# Patient Record
Sex: MALE | Race: WHITE | NOT HISPANIC OR LATINO | Employment: OTHER | URBAN - METROPOLITAN AREA
[De-identification: names, ages, dates, MRNs, and addresses within clinical notes are randomized per-mention and may not be internally consistent; named-entity substitution may affect disease eponyms.]

---

## 2017-02-20 ENCOUNTER — OFFICE VISIT (OUTPATIENT)
Dept: OPTOMETRY | Facility: CLINIC | Age: 72
End: 2017-02-20
Payer: MEDICARE

## 2017-02-20 DIAGNOSIS — H25.13 NUCLEAR SCLEROSIS, BILATERAL: Primary | ICD-10-CM

## 2017-02-20 DIAGNOSIS — H52.4 PRESBYOPIA: ICD-10-CM

## 2017-02-20 DIAGNOSIS — H52.223 REGULAR ASTIGMATISM OF BOTH EYES: ICD-10-CM

## 2017-02-20 DIAGNOSIS — H26.9 CORTICAL CATARACT: ICD-10-CM

## 2017-02-20 DIAGNOSIS — H43.393 VITREOUS FLOATERS, BILATERAL: ICD-10-CM

## 2017-02-20 DIAGNOSIS — H43.811 VITREOUS DETACHMENT OF RIGHT EYE: ICD-10-CM

## 2017-02-20 PROCEDURE — 99999 PR PBB SHADOW E&M-EST. PATIENT-LVL II: CPT | Mod: PBBFAC,,, | Performed by: OPTOMETRIST

## 2017-02-20 PROCEDURE — 92015 DETERMINE REFRACTIVE STATE: CPT | Mod: S$GLB,,, | Performed by: OPTOMETRIST

## 2017-02-20 PROCEDURE — 92014 COMPRE OPH EXAM EST PT 1/>: CPT | Mod: S$GLB,,, | Performed by: OPTOMETRIST

## 2017-02-20 NOTE — MR AVS SNAPSHOT
[facility]  [address]  [facility] [address]  Phone: [phone]  Fax: [phone]                  Peng Sullivan   2017 8:00 AM   Office Visit    Description:  Male : 1945   Provider:  Farhad Burton OD   Department:  [facility]           Reason for Visit     Concerns About Ocular Health                To Do List           Goals (5 Years of Data)     None      [facility]     [facility] -  Assistance  Registered nurses in the [facility] provide clinical advisement, health education, appointment booking, and other advisory services.  Call for this free service at [phone].             Medications           Message regarding Medications     Verify the changes and/or additions to your medication regime listed below are the same as discussed with your clinician today.  If any of these changes or additions are incorrect, please notify your healthcare provider.             Verify that the below list of medications is an accurate representation of the medications you are currently taking.  If none reported, the list may be blank. If incorrect, please contact your healthcare provider. Carry this list with you in case of emergency.           Current Medications     atorvastatin (LIPITOR) 20 MG tablet Take by mouth. 1 Tablet Oral Every day    lisinopril-hydrochlorothiazide (PRINZIDE,ZESTORETIC) 10-12.5 mg per tablet            Clinical Reference Information           Allergies as of 2017     No Known Allergies      Immunizations Administered on Date of Encounter - 2017     None      [facility] Sign-Up     Activating your [facility] account is as easy as 1-2-3!     1) Visit [email or web address], select Sign Up Now, enter this activation code and your date of birth, then select Next.  [email or web address]  Expires: 2017  8:56 AM      2) Create a username and password to use when you visit [facility] in the future and  select a security question in case you lose your password and select Next.    3) Enter your e-mail address and click Sign Up!    Additional Information  If you have questions, please e-mail [email or web address] or call [phone] to talk to our [facility] staff. Remember, [facility] is NOT to be used for urgent needs. For medical emergencies, dial 911.         Instructions    Early nuclear sclerotic/peripheral cortical cataract in both eyes, consistent with age, as noted previously.   S/p vitreous detachment in the right eye.   Bilateral vitreous floaters, more apparent in the right eye.  Otherwise, good ocular health in both eyes. No hypertensive retinal changes in either eye.   Astigmatic refractive error in each eye, with good correctable VA in each eye.  Presbyopia.  New spectacle lens Rx issued for full-time wear.   Recheck in 12 - 18 months - or prior if any problems in the interim.              Language Assistance Services     ATTENTION: Language assistance services are available, free of charge. Please call [phone].      ATENCIÓN: Si shane wu, tiene a del rio disposición servicios gratuitos de asistencia lingüística. Llame al [phone].     NILES Ý: N?u b?n nói Ti?ng Vi?t, có các d?ch v? h? tr? ngôn ng? mi?n phí dành cho b?n. G?i s? [phone].         [facility] complies with applicable Federal civil rights laws and does not discriminate on the basis of race, color, national origin, age, disability, or sex.

## 2017-02-20 NOTE — PATIENT INSTRUCTIONS
Early nuclear sclerotic/peripheral cortical cataract in both eyes, consistent with age, as noted previously.   S/p vitreous detachment in the right eye.   Bilateral vitreous floaters, more apparent in the right eye.  Otherwise, good ocular health in both eyes. No hypertensive retinal changes in either eye.   Astigmatic refractive error in each eye, with good correctable VA in each eye.  Presbyopia.  New spectacle lens Rx issued for full-time wear.   Recheck in 12 - 18 months - or prior if any problems in the interim.
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
## 2018-08-24 ENCOUNTER — OFFICE VISIT (OUTPATIENT)
Dept: OPTOMETRY | Facility: CLINIC | Age: 73
End: 2018-08-24
Payer: COMMERCIAL

## 2018-08-24 DIAGNOSIS — H43.811 VITREOUS DETACHMENT OF RIGHT EYE: ICD-10-CM

## 2018-08-24 DIAGNOSIS — H25.13 NUCLEAR SCLEROSIS, BILATERAL: Primary | ICD-10-CM

## 2018-08-24 DIAGNOSIS — H52.223 REGULAR ASTIGMATISM OF BOTH EYES: ICD-10-CM

## 2018-08-24 DIAGNOSIS — H52.4 PRESBYOPIA: ICD-10-CM

## 2018-08-24 DIAGNOSIS — H26.9 CORTICAL CATARACT: ICD-10-CM

## 2018-08-24 DIAGNOSIS — H43.393 VITREOUS FLOATERS, BILATERAL: ICD-10-CM

## 2018-08-24 PROCEDURE — 92015 DETERMINE REFRACTIVE STATE: CPT | Mod: S$GLB,,, | Performed by: OPTOMETRIST

## 2018-08-24 PROCEDURE — 92014 COMPRE OPH EXAM EST PT 1/>: CPT | Mod: S$GLB,,, | Performed by: OPTOMETRIST

## 2018-08-24 PROCEDURE — 99999 PR PBB SHADOW E&M-EST. PATIENT-LVL III: CPT | Mod: PBBFAC,,, | Performed by: OPTOMETRIST

## 2018-08-24 NOTE — PATIENT INSTRUCTIONS
Early nuclear sclerotic/peripheral cortical cataract in both eyes, consistent with age, as noted previously.   Prior diagnosis of vitreous detachment in the right eye.   Bilateral vitreous floaters, more apparent in the right eye.  Otherwise, good ocular health in both eyes. No hypertensive retinal changes in either eye.   Astigmatic refractive error in each eye, with good correctable VA in each eye.  Presbyopia.  New spectacle lens Rx issued for full-time wear.   Recheck in 12 - 18 months - or prior if any problems in the interim.
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
## 2019-02-19 LAB
CHOL/HDLC RATIO: 3
CHOLESTEROL, TOTAL: 157
HDLC SERPL-MCNC: 59 MG/DL
LDLC SERPL CALC-MCNC: 87 MG/DL
TRIGL SERPL-MCNC: 68 MG/DL (ref 40–160)

## 2019-06-18 ENCOUNTER — TELEPHONE (OUTPATIENT)
Dept: ENDOSCOPY | Facility: HOSPITAL | Age: 74
End: 2019-06-18

## 2019-06-18 DIAGNOSIS — K63.5 POLYP OF COLON, UNSPECIFIED PART OF COLON, UNSPECIFIED TYPE: Primary | ICD-10-CM

## 2019-06-18 NOTE — TELEPHONE ENCOUNTER
----- Message from Howard Golden sent at 6/18/2019  2:45 PM CDT -----  Contact: pt: [phone]  Needs Advice    Reason for call: pt states a referral along with his records was sent over by Dr. Keyes, states Dr. Keyes would like for Dr. Lee to take a look at the records from the pt colonoscopy where a polyp was found and would possible like for Dr. Lee to remove it         Communication Preference: pt: [phone]

## 2019-06-20 NOTE — TELEPHONE ENCOUNTER
----- Message from Howard Golden sent at 6/18/2019  2:45 PM CDT -----  Contact: pt: [phone]  Needs Advice    Reason for call: pt states a referral along with his records was sent over by Dr. Keyes, states Dr. Keyes would like for Dr. Lee to take a look at the records from the pt colonoscopy where a polyp was found and would possible like for Dr. Lee to remove it         Communication Preference: pt: [phone]

## 2019-06-24 ENCOUNTER — TELEPHONE (OUTPATIENT)
Dept: ENDOSCOPY | Facility: HOSPITAL | Age: 74
End: 2019-06-24

## 2019-06-24 NOTE — TELEPHONE ENCOUNTER
----- Message from Ely Mccarthy sent at 6/24/2019  3:33 PM CDT -----  Contact: pt#[phone]  Needs Advice    Reason for call:Pt is callin for status on upcoming procedure       Communication Preference:call    Additional Information:

## 2019-06-24 NOTE — TELEPHONE ENCOUNTER
Spoke with patient. Colonoscopy scheduled 7/19 at 9:30a. Endo Scheduling Nurse will contact patient with prep.

## 2019-07-01 ENCOUNTER — TELEPHONE (OUTPATIENT)
Dept: ENDOSCOPY | Facility: HOSPITAL | Age: 74
End: 2019-07-01

## 2019-07-01 DIAGNOSIS — Z12.11 SPECIAL SCREENING FOR MALIGNANT NEOPLASMS, COLON: Primary | ICD-10-CM

## 2019-07-01 RX ORDER — LOSARTAN POTASSIUM 50 MG/1
50 TABLET ORAL DAILY
COMMUNITY
End: 2019-10-10 | Stop reason: SDUPTHER

## 2019-07-01 RX ORDER — HYDROCHLOROTHIAZIDE 12.5 MG/1
12.5 TABLET ORAL DAILY
COMMUNITY
End: 2019-08-22

## 2019-07-01 RX ORDER — ASPIRIN 81 MG/1
81 TABLET ORAL DAILY
COMMUNITY
End: 2019-07-01

## 2019-07-01 RX ORDER — SODIUM, POTASSIUM,MAG SULFATES 17.5-3.13G
1 SOLUTION, RECONSTITUTED, ORAL ORAL DAILY
Qty: 1 KIT | Refills: 0 | Status: SHIPPED | OUTPATIENT
Start: 2019-07-01 | End: 2019-07-03

## 2019-07-01 NOTE — TELEPHONE ENCOUNTER
Contacted patient to confirm appointment for a Colonoscopy on 7/19/2019 at 9:30 am. Reviewed medical history, medications and instructed on procedure and prep. He voiced an understanding. Sent a copy of instructions to address listed in chart.

## 2019-07-19 ENCOUNTER — ANESTHESIA (OUTPATIENT)
Dept: ENDOSCOPY | Facility: HOSPITAL | Age: 74
End: 2019-07-19
Payer: MEDICARE

## 2019-07-19 ENCOUNTER — HOSPITAL ENCOUNTER (OUTPATIENT)
Facility: HOSPITAL | Age: 74
Discharge: HOME | End: 2019-07-19
Attending: INTERNAL MEDICINE | Admitting: INTERNAL MEDICINE
Payer: MEDICARE

## 2019-07-19 ENCOUNTER — ANESTHESIA EVENT (OUTPATIENT)
Dept: ENDOSCOPY | Facility: HOSPITAL | Age: 74
End: 2019-07-19
Payer: MEDICARE

## 2019-07-19 VITALS
DIASTOLIC BLOOD PRESSURE: 68 MMHG | BODY MASS INDEX: 35.28 KG/M2 | HEIGHT: 71 IN | WEIGHT: 252 LBS | OXYGEN SATURATION: 100 % | HEART RATE: 64 BPM | SYSTOLIC BLOOD PRESSURE: 117 MMHG | RESPIRATION RATE: 19 BRPM | TEMPERATURE: 98 F

## 2019-07-19 DIAGNOSIS — D12.6 COLON ADENOMA: Primary | ICD-10-CM

## 2019-07-19 PROCEDURE — D9220A PRA ANESTHESIA: Mod: ANES,,, | Performed by: ANESTHESIOLOGY

## 2019-07-19 PROCEDURE — 63600175 PHARM REV CODE 636 W HCPCS: Performed by: NURSE ANESTHETIST, CERTIFIED REGISTERED

## 2019-07-19 PROCEDURE — 45390: ICD-10-PCS | Mod: ,,, | Performed by: INTERNAL MEDICINE

## 2019-07-19 PROCEDURE — 88305 TISSUE EXAM BY PATHOLOGIST: CPT | Performed by: PATHOLOGY

## 2019-07-19 PROCEDURE — 88305 TISSUE EXAM BY PATHOLOGIST: CPT | Mod: 26,,, | Performed by: PATHOLOGY

## 2019-07-19 PROCEDURE — D9220A PRA ANESTHESIA: ICD-10-PCS | Mod: CRNA,,, | Performed by: NURSE ANESTHETIST, CERTIFIED REGISTERED

## 2019-07-19 PROCEDURE — 27200997: Performed by: INTERNAL MEDICINE

## 2019-07-19 PROCEDURE — 27202363 HC INJECTION AGENT, SUBMUCOSAL, ANY: Performed by: INTERNAL MEDICINE

## 2019-07-19 PROCEDURE — 37000008 HC ANESTHESIA 1ST 15 MINUTES: Performed by: INTERNAL MEDICINE

## 2019-07-19 PROCEDURE — D9220A PRA ANESTHESIA: ICD-10-PCS | Mod: ANES,,, | Performed by: ANESTHESIOLOGY

## 2019-07-19 PROCEDURE — 45390 COLONOSCOPY W/RESECTION: CPT | Mod: ,,, | Performed by: INTERNAL MEDICINE

## 2019-07-19 PROCEDURE — 45390 COLONOSCOPY W/RESECTION: CPT | Performed by: INTERNAL MEDICINE

## 2019-07-19 PROCEDURE — 88305 TISSUE SPECIMEN TO PATHOLOGY - SURGERY: ICD-10-PCS | Mod: 26,,, | Performed by: PATHOLOGY

## 2019-07-19 PROCEDURE — D9220A PRA ANESTHESIA: Mod: CRNA,,, | Performed by: NURSE ANESTHETIST, CERTIFIED REGISTERED

## 2019-07-19 PROCEDURE — 37000009 HC ANESTHESIA EA ADD 15 MINS: Performed by: INTERNAL MEDICINE

## 2019-07-19 PROCEDURE — 25000003 PHARM REV CODE 250: Performed by: INTERNAL MEDICINE

## 2019-07-19 PROCEDURE — 27201089 HC SNARE, DISP (ANY): Performed by: INTERNAL MEDICINE

## 2019-07-19 RX ORDER — SODIUM CHLORIDE 0.9 % (FLUSH) 0.9 %
10 SYRINGE (ML) INJECTION
Status: DISCONTINUED | OUTPATIENT
Start: 2019-07-19 | End: 2019-07-19 | Stop reason: HOSPADM

## 2019-07-19 RX ORDER — ATORVASTATIN CALCIUM 10 MG/1
10 TABLET, FILM COATED ORAL DAILY
COMMUNITY
End: 2019-08-22

## 2019-07-19 RX ORDER — PROPOFOL 10 MG/ML
VIAL (ML) INTRAVENOUS CONTINUOUS PRN
Status: DISCONTINUED | OUTPATIENT
Start: 2019-07-19 | End: 2019-07-19

## 2019-07-19 RX ORDER — SODIUM CHLORIDE 9 MG/ML
INJECTION, SOLUTION INTRAVENOUS CONTINUOUS
Status: DISCONTINUED | OUTPATIENT
Start: 2019-07-19 | End: 2019-07-19 | Stop reason: HOSPADM

## 2019-07-19 RX ORDER — PROPOFOL 10 MG/ML
VIAL (ML) INTRAVENOUS
Status: DISCONTINUED | OUTPATIENT
Start: 2019-07-19 | End: 2019-07-19

## 2019-07-19 RX ORDER — LIDOCAINE HCL/PF 100 MG/5ML
SYRINGE (ML) INTRAVENOUS
Status: DISCONTINUED | OUTPATIENT
Start: 2019-07-19 | End: 2019-07-19

## 2019-07-19 RX ADMIN — PROPOFOL 80 MG: 10 INJECTION, EMULSION INTRAVENOUS at 10:07

## 2019-07-19 RX ADMIN — PROPOFOL 175 MCG/KG/MIN: 10 INJECTION, EMULSION INTRAVENOUS at 10:07

## 2019-07-19 RX ADMIN — LIDOCAINE HYDROCHLORIDE 50 MG: 20 INJECTION, SOLUTION INTRAVENOUS at 10:07

## 2019-07-19 RX ADMIN — SODIUM CHLORIDE: 0.9 INJECTION, SOLUTION INTRAVENOUS at 10:07

## 2019-07-19 NOTE — [employer] PATIENT INSTRUCTIONS
Discharge Summary/Instructions after an Endoscopic Procedure  Patient Name: Peng Sullivan  Patient MRN: [id number]  Patient YOB: 1945  Friday, July 19, 2019  Shantanu Lee MD  RESTRICTIONS:  During your procedure today, you received medications for sedation.  These   medications may affect your judgment, balance and coordination.  Therefore,   for 24 hours, you have the following restrictions:   - DO NOT drive a car, operate machinery, make legal/financial decisions,   sign important papers or drink alcohol.    ACTIVITY:  Today: no heavy lifting, straining or running due to procedural   sedation/anesthesia.  The following day: return to full activity including work.  DIET:  Eat and drink normally unless instructed otherwise.     TREATMENT FOR COMMON SIDE EFFECTS:  - Mild abdominal pain, nausea, belching, bloating or excessive gas:  rest,   eat lightly and use a heating pad.  - Sore Throat: treat with throat lozenges and/or gargle with warm salt   water.  - Because air was used during the procedure, expelling large amounts of air   from your rectum or belching is normal.  - If a bowel prep was taken, you may not have a bowel movement for 1-3 days.    This is normal.  SYMPTOMS TO WATCH FOR AND REPORT TO YOUR PHYSICIAN:  1. Abdominal pain or bloating, other than gas cramps.  2. Chest pain.  3. Back pain.  4. Signs of infection such as: chills or fever occurring within 24 hours   after the procedure.  5. Rectal bleeding, which would show as bright red, maroon, or black stools.   (A tablespoon of blood from the rectum is not serious, especially if   hemorrhoids are present.)  6. Vomiting.  7. Weakness or dizziness.  GO DIRECTLY TO THE NEAREST EMERGENCY ROOM IF YOU HAVE ANY OF THE FOLLOWING:      Difficulty breathing              Chills and/or fever over 101 F   Persistent vomiting and/or vomiting blood   Severe abdominal pain   Severe chest pain   Black, tarry stools   Bleeding- more than one  tablespoon   Any other symptom or condition that you feel may need urgent attention  Your doctor recommends these additional instructions:  If any biopsies were taken, your doctors clinic will contact you in 1 to 2   weeks with any results.  - Discharge patient to home (ambulatory).   - Resume previous diet; Discharge to home (ambulatory); Resume outpatient   medications  - Await pathology results.   - Repeat colonoscopy in 3 years for surveillance based on pathology results.   Polyp was removed in 1 piece (not piecemeal), so repeat colonoscopy can   likely be in 3 years with Dr. Keyes, depending on final pathology.  - Return to primary care physician as previously scheduled.   - Resume previous diet.  For questions, problems or results please call your physician - Shantanu Lee MD at Work:  [phone].  [facility], EMERGENCY ROOM PHONE NUMBER: [phone]  IF A COMPLICATION OR EMERGENCY SITUATION ARISES AND YOU ARE UNABLE TO REACH   YOUR PHYSICIAN - GO DIRECTLY TO THE EMERGENCY ROOM.  Shantanu Lee MD  7/19/2019 10:50:59 AM  This report has been verified and signed electronically.  [employer]

## 2019-07-19 NOTE — TRANSFER OF CARE
"Anesthesia Transfer of Care Note    Patient: Peng Sullivan    Procedure(s) Performed: Procedure(s) (LRB):  COLONOSCOPY/pos emr (N/A)    Patient location: PACU    Anesthesia Type: general    Transport from OR: Transported from OR on 100% O2 by closed face mask with adequate spontaneous ventilation    Post pain: adequate analgesia    Post assessment: no apparent anesthetic complications    Post vital signs: stable    Level of consciousness: awake    Nausea/Vomiting: no nausea/vomiting    Complications: none    Transfer of care protocol was followed      Last vitals:   Visit Vitals  /73 (BP Location: Left arm, Patient Position: Lying)   Pulse 72   Temp 36.8 °C (98.2 °F) (Temporal)   Resp 17   Ht 5' 11" (1.803 m)   Wt 114.3 kg (252 lb)   SpO2 (!) 93%   BMI 35.15 kg/m²     "

## 2019-07-19 NOTE — ANESTHESIA POSTPROCEDURE EVALUATION
Anesthesia Post Evaluation    Patient: Peng Sullivan    Procedure(s) Performed: Procedure(s) (LRB):  COLONOSCOPY/pos emr (N/A)    Final Anesthesia Type: general  Patient location during evaluation: PACU  Patient participation: Yes- Able to Participate  Level of consciousness: awake and alert  Post-procedure vital signs: reviewed and stable  Pain management: adequate  Airway patency: patent  PONV status at discharge: No PONV  Anesthetic complications: no      Cardiovascular status: blood pressure returned to baseline  Respiratory status: unassisted  Hydration status: euvolemic  Follow-up not needed.          Vitals Value Taken Time   /68 7/19/2019 11:16 AM   Temp 36.9 °C (98.4 °F) 7/19/2019 11:15 AM   Pulse 65 7/19/2019 11:19 AM   Resp 18 7/19/2019 11:19 AM   SpO2 98 % 7/19/2019 11:17 AM   Vitals shown include unvalidated device data.      No case tracking events are documented in the log.      Pain/Frank Score: Frank Score: 10 (7/19/2019 11:15 AM)

## 2019-07-19 NOTE — ANESTHESIA PREPROCEDURE EVALUATION
07/19/2019  Peng Sullivan is a 74 y.o., male for colonoscopy and polyp removal.    Anesthesia Evaluation    I have reviewed the Patient Summary Reports.    I have reviewed the Nursing Notes.   I have reviewed the Medications.     Review of Systems  Anesthesia Hx:  No problems with previous Anesthesia  Denies Family Hx of Anesthesia complications.   Denies Personal Hx of Anesthesia complications.   Cardiovascular:   Exercise tolerance: good Hypertension    Pulmonary:  Pulmonary Normal    Hepatic/GI:   Colon Polyp   Neurological:  Neurology Normal        Physical Exam  General:  Well nourished    Airway/Jaw/Neck:  Airway Findings: Mouth Opening: Normal General Airway Assessment: Adult  Mallampati: II  TM Distance: Normal, at least 6 cm  Jaw/Neck Findings:  Neck ROM: Normal ROM  Neck Findings:     Eyes/Ears/Nose:  Eyes/Ears/Nose Findings:    Dental:  Dental Findings: In tact    Chest/Lungs:  Chest/Lungs Findings: Normal Respiratory Rate, Clear to auscultation     Heart/Vascular:  Heart Findings: Rate: Normal  Rhythm: Regular Rhythm        Mental Status:  Mental Status Findings:  Cooperative, Alert and Oriented       Patient Active Problem List   Diagnosis    Posterior vitreous detachment of both eyes    Nuclear sclerotic cataract of both eyes    Colon adenoma         Anesthesia Plan  Type of Anesthesia, risks & benefits discussed:  Anesthesia Type:  general  Patient's Preference:   Intra-op Monitoring Plan: standard ASA monitors  Intra-op Monitoring Plan Comments:   Post Op Pain Control Plan: per primary service following discharge from PACU  Post Op Pain Control Plan Comments:   Induction:   IV  Beta Blocker:         Informed Consent: Patient understands risks and agrees with Anesthesia plan.  Questions answered. Anesthesia consent signed with patient representative.  ASA Score: 2     Day of Surgery  Review of History & Physical:    H&P update referred to the surgeon.         Ready For Surgery From Anesthesia Perspective.     Past Surgical History:   Procedure Laterality Date    APPENDECTOMY  01/01/1954    COLONOSCOPY  06/03/2019    [facility]

## 2019-07-19 NOTE — DISCHARGE INSTRUCTIONS
Colonoscopy     A camera attached to a flexible tube with a viewing lens is used to take video pictures.     Colonoscopy is a test to view the inside of your lower digestive tract (colon and rectum). Sometimes it can show the last part of the small intestine (ileum). During the test, small pieces of tissue may be removed for testing. This is called a biopsy. Small growths, such as polyps, may also be removed.   Why is colonoscopy done?  The test is done to help look for colon cancer. And it can help find the source of abdominal pain, bleeding, and changes in bowel habits. It may be needed once a year, depending on factors such as your:  · Age  · Health history  · Family health history  · Symptoms  · Results from any prior colonoscopy  Risks and possible complications  These include:  · Bleeding               · A puncture or tear in the colon   · Risks of anesthesia  · A cancer lesion not being seen  Getting ready   To prepare for the test:  · Talk with your healthcare provider about the risks of the test (see below). Also ask your healthcare provider about alternatives to the test.  · Tell your healthcare provider about any medicines you take. Also tell him or her about any health conditions you may have.  · Make sure your rectum and colon are empty for the test. Follow the diet and bowel prep instructions exactly. If you dont, the test may need to be rescheduled.  · Plan for a friend or family member to drive you home after the test.     Colonoscopy provides an inside view of the entire colon.     You may discuss the results with your doctor right away or at a future visit.  During the test   The test is usually done in the hospital on an outpatient basis. This means you go home the same day. The procedure takes about 30 minutes. During that time:  · You are given relaxing (sedating) medicine through an IV line. You may be drowsy, or fully asleep.  · The healthcare provider will first give you a physical exam to  check for anal and rectal problems.  · Then the anus is lubricated and the scope inserted.  · If you are awake, you may have a feeling similar to needing to have a bowel movement. You may also feel pressure as air is pumped into the colon. Its OK to pass gas during the procedure.  · Biopsy, polyp removal, or other treatments may be done during the test.  After the test   You may have gas right after the test. It can help to try to pass it to help prevent later bloating. Your healthcare provider may discuss the results with you right away. Or you may need to schedule a follow-up visit to talk about the results. After the test, you can go back to your normal eating and other activities. You may be tired from the sedation and need to rest for a few hours.  Date Last Reviewed: 11/1/2016 © 2000-2017 [employer]. [address] [place] [address]. All rights reserved. This information is not intended as a substitute for professional medical care. Always follow your healthcare professional's instructions.      Anesthesia: General Anesthesia     You are watched continuously during your procedure by your anesthesia provider.     Youre due to have surgery. During surgery, youll be given medicine called anesthesia or anesthetic. This will keep you comfortable and pain-free. Your anesthesia provider will use general anesthesia.  What is general anesthesia?  General anesthesia puts you into a state like deep sleep. It goes into the bloodstream (IV anesthetics), into the lungs (gas anesthetics), or both. You feel nothing during the procedure. You will not remember it. During the procedure, the anesthesia provider monitors you continuously. He or she checks your heart rate and rhythm, blood pressure, breathing, and blood oxygen.  · IV anesthetics. IV anesthetics are given through an IV line in your arm. Theyre often given first. This is so you are asleep before a gas anesthetic is started. Some kinds of  IV anesthetics relieve pain. Others relax you. Your doctor will decide which kind is best in your case.  · Gas anesthetics. Gas anesthetics are breathed into the lungs. They are often used to keep you asleep. They can be given through a facemask or a tube placed in your larynx or trachea (breathing tube).  ¨ If you have a facemask, your anesthesia provider will most likely place it over your nose and mouth while youre still awake. Youll breathe oxygen through the mask as your IV anesthetic is started. Gas anesthetic may be added through the mask.  ¨ If you have a tube in the larynx or trachea, it will be inserted into your throat after youre asleep.  Anesthesia tools and medicines  You will likely have:  · IV anesthetics. These are put into an IV line into your bloodstream.  · Gas anesthetics. You breathe these anesthetics into your lungs, where they pass into your bloodstream.  · Pulse oximeter. This is a small clip that is attached to the end of your finger. This measures your blood oxygen level.  · Electrocardiography leads (electrodes). These are small sticky pads that are placed on your chest. They record your heart rate and rhythm.  · Blood pressure cuff. This reads your blood pressure.  Risks and possible complications  General anesthesia has some risks. These include:  · Breathing problems  · Nausea and vomiting  · Sore throat or hoarseness (usually temporary)  · Allergic reaction to the anesthetic  · Irregular heartbeat (rare)  · Cardiac arrest (rare)   Anesthesia safety  · Follow all instructions you are given for how long not to eat or drink before your procedure.  · Be sure your doctor knows what medicines and drugs you take. This includes over-the-counter medicines, herbs, supplements, alcohol or other drugs. You will be asked when those were last taken.  · Have an adult family member or friend drive you home after the procedure.  · For the first 24 hours after your surgery:  ¨ Do not drive or use  heavy equipment.  ¨ Do not make important decisions or sign legal documents. If important decisions or signing legal documents is necessary during the first 24 hours after surgery, have a trusted family member or spouse act on your behalf.  ¨ Avoid alcohol.  ¨ Have a responsible adult stay with you. He or she can watch for problems and help keep you safe.  Date Last Reviewed: 12/1/2016  © 4167-1700 [employer]. [address] [place] [address], [place] [address]. All rights reserved. This information is not intended as a substitute for professional medical care. Always follow your healthcare professional's instructions.

## 2019-07-19 NOTE — H&P
Short Stay Endoscopy History and Physical    PCP - Joe Torres MD  Referring Physician - Evans Keyes MD  [address] [place] [address]    Procedure - colonoscopy with EMR  ASA - per anesthesia  Mallampati - per anesthesia  History of Anesthesia problems - no  Family history Anesthesia problems -  no   Plan of anesthesia - General    HPI:  This is a 74 y.o. male here for evaluation of: large cecal polyp    Reflux - no  Dysphagia - no  Abdominal pain - no  Diarrhea - no    ROS:  Constitutional: No fevers, chills, No weight loss  CV: No chest pain  Pulm: No cough, No shortness of breath  GI: see HPI    Medical History:  has a past medical history of Cataract, Diverticulosis, Hemorrhoids, High cholesterol, History of colon polyps (6/2/99, 6/3/19), and Hypertension.    Surgical History:  has a past surgical history that includes Appendectomy (01/01/1954) and Colonoscopy (06/03/2019).    Family History: family history includes Cancer in his sister; Cataracts in his sister; Heart attack in his father; No Known Problems in his brother, maternal aunt, maternal grandfather, maternal grandmother, maternal uncle, mother, paternal aunt, paternal grandfather, paternal grandmother, paternal uncle, sister, sister, sister, and sister..    Social History:  reports that he has quit smoking. He does not have any smokeless tobacco history on file. He reports that he drinks alcohol. He reports that he does not use drugs.    Review of patient's allergies indicates:  No Known Allergies    Medications:   Medications Prior to Admission   Medication Sig Dispense Refill Last Dose    atorvastatin (LIPITOR) 10 MG tablet Take 10 mg by mouth once daily.   7/18/2019 at Unknown time    hydroCHLOROthiazide (HYDRODIURIL) 12.5 MG Tab Take 12.5 mg by mouth once daily.   7/19/2019 at Unknown time    losartan (COZAAR) 50 MG tablet Take 50 mg by mouth once daily.   7/19/2019 at Unknown time       Physical  Exam:    Vital Signs:   Vitals:    07/19/19 0922   BP: 120/73   Pulse: 72   Resp: 17   Temp: 98.2 °F (36.8 °C)       General Appearance: Well appearing in no acute distress  Eyes:    No scleral icterus  ENT: Neck supple  Lungs: CTA anteriorly  Heart:  Regular  Abdomen: Soft, non tender, non distended with normal bowel sounds.    Labs:  Lab Results   Component Value Date    CHOL 167 12/26/2007    TRIG 103 12/26/2007    HDL 56 12/26/2007    ALT 53 (H) 12/26/2007    AST 37 12/26/2007     12/26/2007    K 4.1 12/26/2007     12/26/2007    CREATININE 1.0 12/26/2007    BUN 22 12/26/2007    CO2 26 12/26/2007    PSA 1.0 12/26/2007       I have explained the risks and benefits of this endoscopic procedure to the patient including but not limited to bleeding, inflammation, infection, perforation, and death.      Shantanu Lee MD

## 2019-07-30 ENCOUNTER — TELEPHONE (OUTPATIENT)
Dept: ENDOSCOPY | Facility: HOSPITAL | Age: 74
End: 2019-07-30

## 2019-07-30 NOTE — TELEPHONE ENCOUNTER
----- Message from Shantanu Lee MD sent at 7/29/2019  5:03 PM CDT -----  Julianne- Please let pt know that his polyp pathology was benign. Given that we were able to remove the polyp in 1 piece and the benign pathology, his next colonoscopy should be in 3 years. He can have that done locally by Dr. Keyes.

## 2019-08-15 ENCOUNTER — TELEPHONE (OUTPATIENT)
Dept: ENDOSCOPY | Facility: HOSPITAL | Age: 74
End: 2019-08-15

## 2019-08-15 NOTE — TELEPHONE ENCOUNTER
----- Message from Radha Larkin sent at 8/15/2019  1:34 PM CDT -----  Contact: self [phone]  .Needs Advice    Reason for call:        Communication Preference:phone     Additional Information:  Pt states he's calling in regards to his colonoscopy results please call back to discuss

## 2019-08-15 NOTE — TELEPHONE ENCOUNTER
----- Message from Shantanu Lee MD sent at 7/29/2019  5:03 PM CDT -----  Julianne- Please let pt know that his polyp pathology was benign. Given that we were able to remove the polyp in 1 piece and the benign pathology, his next colonoscopy should be in 3 years. He can have that done locally by Dr. Keyes.    Patient informed.

## 2019-08-22 ENCOUNTER — OFFICE VISIT (OUTPATIENT)
Dept: FAMILY MEDICINE | Facility: CLINIC | Age: 74
End: 2019-08-22
Payer: MEDICARE

## 2019-08-22 ENCOUNTER — TELEPHONE (OUTPATIENT)
Dept: ADMINISTRATIVE | Facility: HOSPITAL | Age: 74
End: 2019-08-22

## 2019-08-22 VITALS
TEMPERATURE: 98 F | SYSTOLIC BLOOD PRESSURE: 122 MMHG | DIASTOLIC BLOOD PRESSURE: 74 MMHG | WEIGHT: 257.94 LBS | HEIGHT: 71 IN | HEART RATE: 70 BPM | BODY MASS INDEX: 36.11 KG/M2 | OXYGEN SATURATION: 94 %

## 2019-08-22 DIAGNOSIS — E78.49 OTHER HYPERLIPIDEMIA: Primary | ICD-10-CM

## 2019-08-22 DIAGNOSIS — I10 BENIGN ESSENTIAL HYPERTENSION: ICD-10-CM

## 2019-08-22 DIAGNOSIS — D12.6 COLON ADENOMA: ICD-10-CM

## 2019-08-22 PROCEDURE — 99499 UNLISTED E&M SERVICE: CPT | Mod: HCNC,S$GLB,, | Performed by: INTERNAL MEDICINE

## 2019-08-22 PROCEDURE — 99213 PR OFFICE/OUTPT VISIT, EST, LEVL III, 20-29 MIN: ICD-10-PCS | Mod: HCNC,S$GLB,, | Performed by: INTERNAL MEDICINE

## 2019-08-22 PROCEDURE — 1101F PR PT FALLS ASSESS DOC 0-1 FALLS W/OUT INJ PAST YR: ICD-10-PCS | Mod: HCNC,CPTII,S$GLB, | Performed by: INTERNAL MEDICINE

## 2019-08-22 PROCEDURE — 99499 RISK ADDL DX/OHS AUDIT: ICD-10-PCS | Mod: HCNC,S$GLB,, | Performed by: INTERNAL MEDICINE

## 2019-08-22 PROCEDURE — 1101F PT FALLS ASSESS-DOCD LE1/YR: CPT | Mod: HCNC,CPTII,S$GLB, | Performed by: INTERNAL MEDICINE

## 2019-08-22 PROCEDURE — 99213 OFFICE O/P EST LOW 20 MIN: CPT | Mod: HCNC,S$GLB,, | Performed by: INTERNAL MEDICINE

## 2019-08-22 PROCEDURE — 99999 PR PBB SHADOW E&M-EST. PATIENT-LVL III: ICD-10-PCS | Mod: PBBFAC,HCNC,, | Performed by: INTERNAL MEDICINE

## 2019-08-22 PROCEDURE — 99999 PR PBB SHADOW E&M-EST. PATIENT-LVL III: CPT | Mod: PBBFAC,HCNC,, | Performed by: INTERNAL MEDICINE

## 2019-08-22 RX ORDER — ATORVASTATIN CALCIUM 20 MG/1
0.5 TABLET, FILM COATED ORAL DAILY
COMMUNITY
Start: 2019-07-27 | End: 2019-10-10 | Stop reason: SDUPTHER

## 2019-08-22 RX ORDER — HYDROCHLOROTHIAZIDE 12.5 MG/1
1 CAPSULE ORAL DAILY
COMMUNITY
End: 2019-10-10 | Stop reason: SDUPTHER

## 2019-08-22 NOTE — PROGRESS NOTES
[facility] Note    Chief Complaint      Chief Complaint   Patient presents with    Follow-up     6 month checkup       History of Present Illness      Peng Sullivan is a 74 y.o. male who presents today for   Chief Complaint   Patient presents with    Follow-up     6 month checkup   .  Patient comes to appointment 6m checkup for addressing chronic issues as below .  He is compliant with all meds and diet . He just recently had colonoscopy with large polyp removal with dr lee polyp benign repeat in 3 years      HPI    No problem-specific Assessment & Plan notes found for this encounter.       Problem List Items Addressed This Visit        Cardiac/Vascular    Other hyperlipidemia - Primary    Overview     Needs cmp and lipid on current regimen cont atorvastatin          Benign essential hypertension    Overview     bp well controlled on current regimen             GI    Colon adenoma    Overview     See scanned in colonoscopy results dr lee                 Past Medical History:  Past Medical History:   Diagnosis Date    Cataract     Diverticulosis     Hemorrhoids     High cholesterol     History of colon polyps 6/2/99, 6/3/19    Hypertension        Past Surgical History:  Past Surgical History:   Procedure Laterality Date    APPENDECTOMY  01/01/1954    [facility]  06/03/2019    [facility]    COLONOSCOPY/pos emr N/A 7/19/2019    Performed by Shantanu Lee MD at [facility] (2ND FLR)       Family History:  family history includes Cancer in his sister; Cataracts in his sister; Heart attack in his father; No Known Problems in his brother, maternal aunt, maternal grandfather, maternal grandmother, maternal uncle, mother, paternal aunt, paternal grandfather, paternal grandmother, paternal uncle, sister, sister, sister, and sister.     Social History:  Social History     Socioeconomic History    Marital status:      Spouse name: Not on file    Number of children:  Not on file    Years of education: Not on file    Highest education level: Not on file   Occupational History    Not on file   Social Needs    Financial resource strain: Not on file    Food insecurity:     Worry: Not on file     Inability: Not on file    Transportation needs:     Medical: Not on file     Non-medical: Not on file   Tobacco Use    Smoking status: Former Smoker     Packs/day: 0.50     Years: 30.00     Pack years: 15.00     Types: Cigarettes     Last attempt to quit:      Years since quittin.6    Smokeless tobacco: Former User     Types: Chew     Quit date:    Substance and Sexual Activity    Alcohol use: Yes     Binge frequency: Monthly     Comment: rarely    Drug use: No    Sexual activity: Not on file   Lifestyle    Physical activity:     Days per week: Not on file     Minutes per session: Not on file    Stress: Not at all   Relationships    Social connections:     Talks on phone: Not on file     Gets together: Not on file     Attends Jewish service: Not on file     Active member of club or organization: Not on file     Attends meetings of clubs or organizations: Not on file     Relationship status: Not on file   Other Topics Concern    Not on file   Social History Narrative    Not on file       Review of Systems:   Review of Systems   Constitutional: Negative for fever and weight loss.   HENT: Negative for congestion, hearing loss and sore throat.    Eyes: Negative for blurred vision.   Respiratory: Negative for cough and shortness of breath.    Cardiovascular: Negative for chest pain, palpitations, claudication and leg swelling.   Gastrointestinal: Negative for abdominal pain, constipation, diarrhea and vomiting.   Genitourinary: Negative for dysuria.   Musculoskeletal: Negative for back pain and myalgias.   Skin: Negative for rash.   Neurological: Negative for focal weakness and headaches.   Psychiatric/Behavioral: Negative for depression, memory loss and suicidal  "ideas. The patient is not nervous/anxious and does not have insomnia.         Medications:  Outpatient Encounter Medications as of 8/22/2019   Medication Sig Dispense Refill    atorvastatin (LIPITOR) 20 MG tablet Take 0.5 tablets by mouth once daily.      hydroCHLOROthiazide (MICROZIDE) 12.5 mg capsule Take 1 capsule by mouth once daily.      losartan (COZAAR) 50 MG tablet Take 50 mg by mouth once daily.      [DISCONTINUED] atorvastatin (LIPITOR) 10 MG tablet Take 10 mg by mouth once daily.      [DISCONTINUED] hydroCHLOROthiazide (HYDRODIURIL) 12.5 MG Tab Take 12.5 mg by mouth once daily.       No facility-administered encounter medications on file as of 8/22/2019.        Allergies:  Review of patient's allergies indicates:  No Known Allergies      Physical Exam      Vitals:    08/22/19 0845   BP:    Pulse: 70   Temp:         Vital Signs  Temp: 97.5 °F (36.4 °C)  Temp src: Oral  Pulse: 70  SpO2: (!) 94 %  BP: 122/74  BP Location: Right arm  Patient Position: Sitting  Pain Score: 0-No pain  Height and Weight  Height: 5' 11" (180.3 cm)  Weight: 117 kg (257 lb 15 oz)  BSA (Calculated - sq m): 2.42 sq meters  BMI (Calculated): 36.1  Weight in (lb) to have BMI = 25: 178.9]     Body mass index is 35.98 kg/m².    Physical Exam   Constitutional: He is oriented to person, place, and time. He appears well-developed and well-nourished.   HENT:   Head: Normocephalic.   Eyes: Pupils are equal, round, and reactive to light.   Neck: Normal range of motion. No thyromegaly present.   Cardiovascular: Normal rate and regular rhythm. Exam reveals no gallop and no friction rub.   No murmur heard.  Pulmonary/Chest: Effort normal and breath sounds normal.   Abdominal: Soft. Bowel sounds are normal.   Musculoskeletal: Normal range of motion. He exhibits no edema.   Neurological: He is alert and oriented to person, place, and time. No sensory deficit.   Skin: Skin is warm and dry.   Psychiatric: He has a normal mood and affect. His " behavior is normal.        Laboratory:  CBC:  No results for input(s): WBC, RBC, HGB, HCT, PLT, MCV, MCH, MCHC in the last 2160 hours.  CMP:  No results for input(s): GLU, CALCIUM, ALBUMIN, PROT, NA, K, CO2, CL, BUN, ALKPHOS, ALT, AST, BILITOT in the last 2160 hours.    Invalid input(s): CREATININ  URINALYSIS:  No results for input(s): COLORU, CLARITYU, SPECGRAV, PHUR, PROTEINUA, GLUCOSEU, BILIRUBINCON, BLOODU, WBCU, RBCU, BACTERIA, MUCUS, NITRITE, LEUKOCYTESUR, UROBILINOGEN, HYALINECASTS in the last 2160 hours.   LIPIDS:  No results for input(s): TSH, HDL, CHOL, TRIG, LDLCALC, CHOLHDL, NONHDLCHOL, TOTALCHOLEST in the last 2160 hours.  TSH:  No results for input(s): TSH in the last 2160 hours.  A1C:  No results for input(s): HGBA1C in the last 2160 hours.    Radiology:        Assessment:     Peng Sullivan is a 74 y.o.male with:    Other hyperlipidemia  -     Comprehensive metabolic panel; Future; Expected date: 08/22/2019  -     Lipid panel; Future; Expected date: 08/22/2019    Benign essential hypertension    Colon adenoma                Plan:     As above, continue current medications and maintain follow up with specialists.  Return to clinic in 6  months.    Frederick W Dantagnan [facility]

## 2019-08-22 NOTE — PROGRESS NOTES
Patient, Peng Sullivan (MRN #[id number]), presented with a recorded BMI of 35.98 kg/m^2 and a documented comorbidity(s):  - Hypertension  - Hyperlipidemia  to which the severe obesity is a contributing factor. This is consistent with the definition of severe obesity (BMI 35.0-39.9) with comorbidity (ICD-10 E66.01, Z68.35). The patient's severe obesity was monitored, evaluated, addressed and/or treated. This addendum to the medical record is made on 08/22/2019.

## 2019-08-24 LAB
ALBUMIN SERPL-MCNC: 4.2 G/DL (ref 3.6–5.1)
ALBUMIN/GLOB SERPL: 1.8 (CALC) (ref 1–2.5)
ALP SERPL-CCNC: 76 U/L (ref 40–115)
ALT SERPL-CCNC: 32 U/L (ref 9–46)
AST SERPL-CCNC: 20 U/L (ref 10–35)
BILIRUB SERPL-MCNC: 0.7 MG/DL (ref 0.2–1.2)
BUN SERPL-MCNC: 18 MG/DL (ref 7–25)
BUN/CREAT SERPL: NORMAL (CALC) (ref 6–22)
CALCIUM SERPL-MCNC: 9.5 MG/DL (ref 8.6–10.3)
CHLORIDE SERPL-SCNC: 102 MMOL/L (ref 98–110)
CHOLEST SERPL-MCNC: 157 MG/DL
CHOLEST/HDLC SERPL: 2.9 (CALC)
CO2 SERPL-SCNC: 29 MMOL/L (ref 20–32)
CREAT SERPL-MCNC: 0.85 MG/DL (ref 0.7–1.18)
GFRSERPLBLD MDRD-ARVRAT: 86 ML/MIN/1.73M2
GLOBULIN SER CALC-MCNC: 2.4 G/DL (CALC) (ref 1.9–3.7)
GLUCOSE SERPL-MCNC: 96 MG/DL (ref 65–99)
HDLC SERPL-MCNC: 54 MG/DL
LDLC SERPL CALC-MCNC: 85 MG/DL (CALC)
NONHDLC SERPL-MCNC: 103 MG/DL (CALC)
POTASSIUM SERPL-SCNC: 4 MMOL/L (ref 3.5–5.3)
PROT SERPL-MCNC: 6.6 G/DL (ref 6.1–8.1)
SODIUM SERPL-SCNC: 140 MMOL/L (ref 135–146)
TRIGL SERPL-MCNC: 87 MG/DL

## 2019-10-10 RX ORDER — LOSARTAN POTASSIUM 50 MG/1
50 TABLET ORAL DAILY
Qty: 90 TABLET | Refills: 3 | Status: SHIPPED | OUTPATIENT
Start: 2019-10-10 | End: 2020-09-01

## 2019-10-10 RX ORDER — HYDROCHLOROTHIAZIDE 12.5 MG/1
12.5 CAPSULE ORAL DAILY
Qty: 90 CAPSULE | Refills: 3 | Status: SHIPPED | OUTPATIENT
Start: 2019-10-10 | End: 2020-09-01

## 2019-10-10 RX ORDER — ATORVASTATIN CALCIUM 20 MG/1
10 TABLET, FILM COATED ORAL DAILY
Qty: 90 TABLET | Refills: 3 | Status: SHIPPED | OUTPATIENT
Start: 2019-10-10 | End: 2020-09-01

## 2019-10-10 NOTE — TELEPHONE ENCOUNTER
Spoke to patient about medication. Patient states his medication refills were refused as per [facility]. Gave patient fax number to give to [facility] and is requesting refill on his medications.

## 2019-10-10 NOTE — TELEPHONE ENCOUNTER
----- Message from Serjio Coffman sent at 10/10/2019 10:14 AM CDT -----  Contact: [phone]/self  Patient calling to speak with you about medication  Please call back to assist at [phone]

## 2019-11-19 ENCOUNTER — OFFICE VISIT (OUTPATIENT)
Dept: FAMILY MEDICINE | Facility: CLINIC | Age: 74
End: 2019-11-19
Payer: MEDICARE

## 2019-11-19 VITALS
OXYGEN SATURATION: 97 % | HEIGHT: 71 IN | WEIGHT: 259.13 LBS | DIASTOLIC BLOOD PRESSURE: 80 MMHG | HEART RATE: 80 BPM | BODY MASS INDEX: 36.28 KG/M2 | RESPIRATION RATE: 18 BRPM | TEMPERATURE: 98 F | SYSTOLIC BLOOD PRESSURE: 118 MMHG

## 2019-11-19 DIAGNOSIS — J40 BRONCHITIS: Primary | ICD-10-CM

## 2019-11-19 PROBLEM — E78.2 MIXED HYPERLIPIDEMIA: Status: ACTIVE | Noted: 2019-08-22

## 2019-11-19 PROCEDURE — 1101F PT FALLS ASSESS-DOCD LE1/YR: CPT | Mod: HCNC,CPTII,S$GLB, | Performed by: INTERNAL MEDICINE

## 2019-11-19 PROCEDURE — 99213 OFFICE O/P EST LOW 20 MIN: CPT | Mod: HCNC,S$GLB,, | Performed by: INTERNAL MEDICINE

## 2019-11-19 PROCEDURE — 1101F PR PT FALLS ASSESS DOC 0-1 FALLS W/OUT INJ PAST YR: ICD-10-PCS | Mod: HCNC,CPTII,S$GLB, | Performed by: INTERNAL MEDICINE

## 2019-11-19 PROCEDURE — 99213 PR OFFICE/OUTPT VISIT, EST, LEVL III, 20-29 MIN: ICD-10-PCS | Mod: HCNC,S$GLB,, | Performed by: INTERNAL MEDICINE

## 2019-11-19 PROCEDURE — 99999 PR PBB SHADOW E&M-EST. PATIENT-LVL III: CPT | Mod: PBBFAC,HCNC,, | Performed by: INTERNAL MEDICINE

## 2019-11-19 PROCEDURE — 99999 PR PBB SHADOW E&M-EST. PATIENT-LVL III: ICD-10-PCS | Mod: PBBFAC,HCNC,, | Performed by: INTERNAL MEDICINE

## 2019-11-19 RX ORDER — BENZONATATE 200 MG/1
200 CAPSULE ORAL 3 TIMES DAILY PRN
Qty: 30 CAPSULE | Refills: 1 | Status: SHIPPED | OUTPATIENT
Start: 2019-11-19 | End: 2019-11-29

## 2019-11-19 RX ORDER — AZITHROMYCIN 250 MG/1
TABLET, FILM COATED ORAL
Qty: 6 TABLET | Refills: 0 | Status: SHIPPED | OUTPATIENT
Start: 2019-11-19 | End: 2019-11-24

## 2019-11-19 RX ORDER — LOSARTAN POTASSIUM 50 MG/1
TABLET ORAL
COMMUNITY
End: 2020-09-01

## 2019-11-19 RX ORDER — ATORVASTATIN CALCIUM 10 MG/1
10 TABLET, FILM COATED ORAL DAILY
COMMUNITY
End: 2021-03-08

## 2019-11-19 RX ORDER — HYDROCHLOROTHIAZIDE 12.5 MG/1
CAPSULE ORAL
COMMUNITY
End: 2020-12-10

## 2019-11-19 NOTE — PROGRESS NOTES
[facility] Note    Chief Complaint      Chief Complaint   Patient presents with    Cough     x 1 month        History of Present Illness      Peng Sullivan is a 74 y.o. male who presents today for   Chief Complaint   Patient presents with    Cough     x 1 month    .  Patient comes to appointment for acute visit related to above , has been dealing with chronic cough/ wheezing off and on for last month . All chronic issues as discussed in detail at last visit      HPI    No problem-specific Assessment & Plan notes found for this encounter.       Problem List Items Addressed This Visit        Pulmonary    Bronchitis - Primary    Overview     zpak , tessalon perles , mucinex dm                 Past Medical History:  Past Medical History:   Diagnosis Date    Cataract     Diverticulosis     Hemorrhoids     High cholesterol     History of colon polyps 6/2/99, 6/3/19    Hypertension        Past Surgical History:  Past Surgical History:   Procedure Laterality Date    APPENDECTOMY  01/01/1954    [facility]    COLONOSCOPY N/A 7/19/2019    Procedure: COLONOSCOPY/pos emr;  Surgeon: Shantanu Lee MD;  Location: [facility] ([address] [place] [address]);  Service: Endoscopy;  Laterality: N/A;  Referred by Dr. BERTO Keyes-see media tab for scanned records-      HT: 5'11, WT: 254.4, BMI: 35.5       Family History:  family history includes Cancer in his sister; Cataracts in his sister; Heart attack in his father; No Known Problems in his brother, maternal aunt, maternal grandfather, maternal grandmother, maternal uncle, mother, paternal aunt, paternal grandfather, paternal grandmother, paternal uncle, sister, sister, sister, and sister.     Social History:  Social History     Socioeconomic History    Marital status:      Spouse name: Not on file    Number of children: Not on file    Years of education: Not on file    Highest education level: Not on file    Occupational History    Not on file   Social Needs    Financial resource strain: Not on file    Food insecurity:     Worry: Not on file     Inability: Not on file    Transportation needs:     Medical: Not on file     Non-medical: Not on file   Tobacco Use    Smoking status: Former Smoker     Packs/day: 0.50     Years: 30.00     Pack years: 15.00     Types: Cigarettes     Last attempt to quit:      Years since quittin.9    Smokeless tobacco: Former User     Types: Chew     Quit date:    Substance and Sexual Activity    Alcohol use: Yes     Binge frequency: Monthly     Comment: rarely    Drug use: No    Sexual activity: Not on file   Lifestyle    Physical activity:     Days per week: Not on file     Minutes per session: Not on file    Stress: Not at all   Relationships    Social connections:     Talks on phone: Not on file     Gets together: Not on file     Attends Gnosticism service: Not on file     Active member of club or organization: Not on file     Attends meetings of clubs or organizations: Not on file     Relationship status: Not on file   Other Topics Concern    Not on file   Social History Narrative    Not on file       Review of Systems:   Review of Systems   Constitutional: Negative for fever.   HENT: Negative for congestion and sore throat.    Respiratory: Positive for cough. Negative for shortness of breath and wheezing.    Cardiovascular: Negative for chest pain.   Gastrointestinal: Negative for heartburn and nausea.        Medications:  Outpatient Encounter Medications as of 2019   Medication Sig Dispense Refill    atorvastatin (LIPITOR) 10 MG tablet Take 10 mg by mouth once daily.      hydroCHLOROthiazide (MICROZIDE) 12.5 mg capsule Take 1 capsule (12.5 mg total) by mouth once daily. 90 capsule 3    losartan (COZAAR) 50 MG tablet Take 1 tablet (50 mg total) by mouth once daily. 90 tablet 3    atorvastatin (LIPITOR) 20 MG tablet Take 0.5 tablets (10 mg total) by  "mouth once daily. 90 tablet 3    azithromycin (Z-MACEY) 250 MG tablet Take 2 tablets by mouth on day 1; Take 1 tablet by mouth on days 2-5 6 tablet 0    benzonatate (TESSALON) 200 MG capsule Take 1 capsule (200 mg total) by mouth 3 (three) times daily as needed for Cough. 30 capsule 1    hydroCHLOROthiazide (MICROZIDE) 12.5 mg capsule hydrochlorothiazide 12.5 mg capsule      losartan (COZAAR) 50 MG tablet losartan 50 mg tablet       No facility-administered encounter medications on file as of 11/19/2019.        Allergies:  Review of patient's allergies indicates:  No Known Allergies      Physical Exam      Vitals:    11/19/19 1100   BP: 118/80   Pulse: 80   Resp: 18   Temp: 97.8 °F (36.6 °C)        Vital Signs  Temp: 97.8 °F (36.6 °C)  Temp src: Oral  Pulse: 80  Resp: 18  SpO2: 97 %  BP: 118/80  BP Location: Right arm  Patient Position: Sitting  Pain Score: 0-No pain  Height and Weight  Height: 5' 11" (180.3 cm)  Weight: 117.5 kg (259 lb 2.4 oz)  BSA (Calculated - sq m): 2.43 sq meters  BMI (Calculated): 36.2  Weight in (lb) to have BMI = 25: 178.9]     Body mass index is 36.14 kg/m².    Physical Exam   Constitutional: He appears well-developed and well-nourished.   HENT:   Right Ear: Tympanic membrane normal.   Left Ear: Tympanic membrane normal.   Cardiovascular: Normal rate and regular rhythm.   Pulmonary/Chest: Effort normal and breath sounds normal.   Abdominal: Normal appearance and bowel sounds are normal.   Lymphadenopathy:     He has no cervical adenopathy.        Laboratory:  CBC:  No results for input(s): WBC, RBC, HGB, HCT, PLT, MCV, MCH, MCHC in the last 2160 hours.  CMP:  Recent Labs   Lab Result Units 08/23/19  0649   Glucose mg/dL 96   Calcium mg/dL 9.5   Albumin g/dL 4.2   Total Protein g/dL 6.6   Sodium mmol/L 140   Potassium mmol/L 4.0   CO2 mmol/L 29   Chloride mmol/L 102   BUN, Bld mg/dL 18   Alkaline Phosphatase U/L 76   ALT U/L 32   AST U/L 20   Total Bilirubin mg/dL 0.7     URINALYSIS:  No " results for input(s): COLORU, CLARITYU, SPECGRAV, PHUR, PROTEINUA, GLUCOSEU, BILIRUBINCON, BLOODU, WBCU, RBCU, BACTERIA, MUCUS, NITRITE, LEUKOCYTESUR, UROBILINOGEN, HYALINECASTS in the last 2160 hours.   LIPIDS:  Recent Labs   Lab Result Units 08/23/19  0649   HDL mg/dL 54   Cholesterol mg/dL 157   Triglycerides mg/dL 87   LDL Cholesterol mg/dL (calc) 85   Hdl/Cholesterol Ratio (calc) 2.9   Non HDL Chol. (LDL+VLDL) mg/dL (calc) 103     TSH:  No results for input(s): TSH in the last 2160 hours.  A1C:  No results for input(s): HGBA1C in the last 2160 hours.    Radiology:        Assessment:     Peng Sullivan is a 74 y.o.male with:    Bronchitis  -     azithromycin (Z-MACEY) 250 MG tablet; Take 2 tablets by mouth on day 1; Take 1 tablet by mouth on days 2-5  Dispense: 6 tablet; Refill: 0  -     benzonatate (TESSALON) 200 MG capsule; Take 1 capsule (200 mg total) by mouth 3 (three) times daily as needed for Cough.  Dispense: 30 capsule; Refill: 1                Plan:     Problem List Items Addressed This Visit        Pulmonary    Bronchitis - Primary    Overview     zpak , tessalon perles , mucinex dm                As above, continue current medications and maintain follow up with specialists.  Return to clinic in as scheduled months.    Frederick W Dantagnan [facility]

## 2020-03-03 ENCOUNTER — OFFICE VISIT (OUTPATIENT)
Dept: FAMILY MEDICINE | Facility: CLINIC | Age: 75
End: 2020-03-03
Payer: MEDICARE

## 2020-03-03 VITALS
HEART RATE: 64 BPM | SYSTOLIC BLOOD PRESSURE: 128 MMHG | HEIGHT: 71 IN | BODY MASS INDEX: 36.02 KG/M2 | WEIGHT: 257.25 LBS | DIASTOLIC BLOOD PRESSURE: 80 MMHG | OXYGEN SATURATION: 97 % | RESPIRATION RATE: 18 BRPM | TEMPERATURE: 98 F

## 2020-03-03 DIAGNOSIS — I10 BENIGN ESSENTIAL HYPERTENSION: Primary | ICD-10-CM

## 2020-03-03 DIAGNOSIS — E66.9 CLASS 2 OBESITY WITH BODY MASS INDEX (BMI) OF 35.0 TO 35.9 IN ADULT, UNSPECIFIED OBESITY TYPE, UNSPECIFIED WHETHER SERIOUS COMORBIDITY PRESENT: ICD-10-CM

## 2020-03-03 DIAGNOSIS — E78.2 MIXED HYPERLIPIDEMIA: ICD-10-CM

## 2020-03-03 DIAGNOSIS — Z12.5 PROSTATE CANCER SCREENING: ICD-10-CM

## 2020-03-03 PROBLEM — E66.812 CLASS 2 OBESITY WITH BODY MASS INDEX (BMI) OF 35.0 TO 35.9 IN ADULT: Status: ACTIVE | Noted: 2020-03-03

## 2020-03-03 PROCEDURE — 3079F PR MOST RECENT DIASTOLIC BLOOD PRESSURE 80-89 MM HG: ICD-10-PCS | Mod: HCNC,CPTII,S$GLB, | Performed by: INTERNAL MEDICINE

## 2020-03-03 PROCEDURE — 99214 PR OFFICE/OUTPT VISIT, EST, LEVL IV, 30-39 MIN: ICD-10-PCS | Mod: HCNC,S$GLB,, | Performed by: INTERNAL MEDICINE

## 2020-03-03 PROCEDURE — 99999 PR PBB SHADOW E&M-EST. PATIENT-LVL III: CPT | Mod: PBBFAC,HCNC,, | Performed by: INTERNAL MEDICINE

## 2020-03-03 PROCEDURE — 99999 PR PBB SHADOW E&M-EST. PATIENT-LVL III: ICD-10-PCS | Mod: PBBFAC,HCNC,, | Performed by: INTERNAL MEDICINE

## 2020-03-03 PROCEDURE — 3074F PR MOST RECENT SYSTOLIC BLOOD PRESSURE < 130 MM HG: ICD-10-PCS | Mod: HCNC,CPTII,S$GLB, | Performed by: INTERNAL MEDICINE

## 2020-03-03 PROCEDURE — 3074F SYST BP LT 130 MM HG: CPT | Mod: HCNC,CPTII,S$GLB, | Performed by: INTERNAL MEDICINE

## 2020-03-03 PROCEDURE — 1159F PR MEDICATION LIST DOCUMENTED IN MEDICAL RECORD: ICD-10-PCS | Mod: HCNC,S$GLB,, | Performed by: INTERNAL MEDICINE

## 2020-03-03 PROCEDURE — 1126F PR PAIN SEVERITY QUANTIFIED, NO PAIN PRESENT: ICD-10-PCS | Mod: HCNC,S$GLB,, | Performed by: INTERNAL MEDICINE

## 2020-03-03 PROCEDURE — 1126F AMNT PAIN NOTED NONE PRSNT: CPT | Mod: HCNC,S$GLB,, | Performed by: INTERNAL MEDICINE

## 2020-03-03 PROCEDURE — 1101F PR PT FALLS ASSESS DOC 0-1 FALLS W/OUT INJ PAST YR: ICD-10-PCS | Mod: HCNC,CPTII,S$GLB, | Performed by: INTERNAL MEDICINE

## 2020-03-03 PROCEDURE — 1101F PT FALLS ASSESS-DOCD LE1/YR: CPT | Mod: HCNC,CPTII,S$GLB, | Performed by: INTERNAL MEDICINE

## 2020-03-03 PROCEDURE — 1159F MED LIST DOCD IN RCRD: CPT | Mod: HCNC,S$GLB,, | Performed by: INTERNAL MEDICINE

## 2020-03-03 PROCEDURE — 99214 OFFICE O/P EST MOD 30 MIN: CPT | Mod: HCNC,S$GLB,, | Performed by: INTERNAL MEDICINE

## 2020-03-03 PROCEDURE — 3079F DIAST BP 80-89 MM HG: CPT | Mod: HCNC,CPTII,S$GLB, | Performed by: INTERNAL MEDICINE

## 2020-03-03 NOTE — PROGRESS NOTES
[facility] Note    Chief Complaint      Chief Complaint   Patient presents with    Follow-up     6 MONTH FOLLOW UP        History of Present Illness      Peng Sullivan is a 74 y.o. male who presents today for   Chief Complaint   Patient presents with    Follow-up     6 MONTH FOLLOW UP    .  Patient comes to appointment Today for 6 m checkup for chronic issues as below . Is compliant with all meds and diet . Needs psa this visit for screen . All labs from last visit reviewed and look good .     HPI    No problem-specific Assessment & Plan notes found for this encounter.       Problem List Items Addressed This Visit        Cardiac/Vascular    Mixed hyperlipidemia    Overview      Cont current regimen atorvastatin is tolerating well          Benign essential hypertension - Primary    Overview     bp well controlled on current regimen             Renal/    Prostate cancer screening    Overview     Check psa             Endocrine    Class 2 obesity with body mass index (BMI) of 35.0 to 35.9 in adult    Overview     counciled on diet and exercise                Past Medical History:  Past Medical History:   Diagnosis Date    Cataract     Diverticulosis     Hemorrhoids     High cholesterol     History of colon polyps 6/2/99, 6/3/19    Hypertension        Past Surgical History:  Past Surgical History:   Procedure Laterality Date    APPENDECTOMY  01[birth year]01/1954    [facility]    COLONOSCOPY N/A 7/19/2019    Procedure: COLONOSCOPY/pos emr;  Surgeon: Shantanu Lee MD;  Location: [facility] ([address] [place] [address]);  Service: Endoscopy;  Laterality: N/A;  Referred by Dr. BERTO Keyes-see media tab for scanned records-      HT: 5'11, WT: 254.4, BMI: 35.5       Family History:  family history includes Cancer in his sister; Cataracts in his sister; Heart attack in his father; No Known Problems in his brother, maternal aunt, maternal grandfather, maternal grandmother,  maternal uncle, mother, paternal aunt, paternal grandfather, paternal grandmother, paternal uncle, sister, sister, sister, and sister.     Social History:  Social History     Socioeconomic History    Marital status:      Spouse name: Not on file    Number of children: Not on file    Years of education: Not on file    Highest education level: Not on file   Occupational History    Not on file   Social Needs    Financial resource strain: Not on file    Food insecurity:     Worry: Not on file     Inability: Not on file    Transportation needs:     Medical: Not on file     Non-medical: Not on file   Tobacco Use    Smoking status: Former Smoker     Packs/day: 0.50     Years: 30.00     Pack years: 15.00     Types: Cigarettes     Last attempt to quit:      Years since quittin.1    Smokeless tobacco: Former User     Types: Chew     Quit date:    Substance and Sexual Activity    Alcohol use: Yes     Binge frequency: Monthly     Comment: rarely    Drug use: No    Sexual activity: Not on file   Lifestyle    Physical activity:     Days per week: Not on file     Minutes per session: Not on file    Stress: Not at all   Relationships    Social connections:     Talks on phone: Not on file     Gets together: Not on file     Attends Oriental orthodox service: Not on file     Active member of club or organization: Not on file     Attends meetings of clubs or organizations: Not on file     Relationship status: Not on file   Other Topics Concern    Not on file   Social History Narrative    Not on file       Review of Systems:   Review of Systems   Constitutional: Negative for fever and weight loss.   HENT: Negative for congestion, ear pain, hearing loss and sore throat.    Eyes: Negative for blurred vision.   Respiratory: Negative for cough and shortness of breath.    Cardiovascular: Negative for chest pain, palpitations, claudication and leg swelling.   Gastrointestinal: Negative for abdominal pain,  "constipation, diarrhea and heartburn.   Genitourinary: Negative for dysuria.   Musculoskeletal: Positive for back pain. Negative for myalgias.   Skin: Negative for rash.   Neurological: Negative for focal weakness and headaches.   Psychiatric/Behavioral: Negative for depression and suicidal ideas. The patient is not nervous/anxious.         Medications:  Outpatient Encounter Medications as of 3/3/2020   Medication Sig Dispense Refill    atorvastatin (LIPITOR) 10 MG tablet Take 10 mg by mouth once daily.      atorvastatin (LIPITOR) 20 MG tablet Take 0.5 tablets (10 mg total) by mouth once daily. 90 tablet 3    hydroCHLOROthiazide (MICROZIDE) 12.5 mg capsule Take 1 capsule (12.5 mg total) by mouth once daily. 90 capsule 3    hydroCHLOROthiazide (MICROZIDE) 12.5 mg capsule hydrochlorothiazide 12.5 mg capsule      losartan (COZAAR) 50 MG tablet Take 1 tablet (50 mg total) by mouth once daily. 90 tablet 3    losartan (COZAAR) 50 MG tablet losartan 50 mg tablet       No facility-administered encounter medications on file as of 3/3/2020.        Allergies:  Review of patient's allergies indicates:  No Known Allergies      Physical Exam      Vitals:    03/03/20 0831   BP: 128/80   Pulse: 64   Resp: 18   Temp: 97.7 °F (36.5 °C)        Vital Signs  Temp: 97.7 °F (36.5 °C)  Temp src: Oral  Pulse: 64  Resp: 18  SpO2: 97 %  BP: 128/80  BP Location: Right arm  Patient Position: Sitting  Pain Score: 0-No pain  Height and Weight  Height: 5' 11" (180.3 cm)  Weight: 116.7 kg (257 lb 4.4 oz)  BSA (Calculated - sq m): 2.42 sq meters  BMI (Calculated): 35.9  Weight in (lb) to have BMI = 25: 178.9]     Body mass index is 35.88 kg/m².    Physical Exam   Constitutional: He is oriented to person, place, and time. He appears well-developed and well-nourished.   HENT:   Head: Normocephalic.   Eyes: Pupils are equal, round, and reactive to light.   Neck: Normal range of motion. No thyromegaly present.   Cardiovascular: Normal rate and " regular rhythm. Exam reveals no gallop and no friction rub.   No murmur heard.  Pulmonary/Chest: Effort normal and breath sounds normal.   Abdominal: Soft. Bowel sounds are normal.   Musculoskeletal: Normal range of motion. He exhibits no edema.   Neurological: He is alert and oriented to person, place, and time. No sensory deficit.   Skin: Skin is warm and dry.   Psychiatric: He has a normal mood and affect. His behavior is normal.        Laboratory:  CBC:  No results for input(s): WBC, RBC, HGB, HCT, PLT, MCV, MCH, MCHC in the last 2160 hours.  CMP:  No results for input(s): GLU, CALCIUM, ALBUMIN, PROT, NA, K, CO2, CL, BUN, ALKPHOS, ALT, AST, BILITOT in the last 2160 hours.    Invalid input(s): CREATININ  URINALYSIS:  No results for input(s): COLORU, CLARITYU, SPECGRAV, PHUR, PROTEINUA, GLUCOSEU, BILIRUBINCON, BLOODU, WBCU, RBCU, BACTERIA, MUCUS, NITRITE, LEUKOCYTESUR, UROBILINOGEN, HYALINECASTS in the last 2160 hours.   LIPIDS:  No results for input(s): TSH, HDL, CHOL, TRIG, LDLCALC, CHOLHDL, NONHDLCHOL, TOTALCHOLEST in the last 2160 hours.  TSH:  No results for input(s): TSH in the last 2160 hours.  A1C:  No results for input(s): HGBA1C in the last 2160 hours.    Radiology:        Assessment:     Peng Sullivan is a 74 y.o.male with:    Benign essential hypertension    Mixed hyperlipidemia    Class 2 obesity with body mass index (BMI) of 35.0 to 35.9 in adult, unspecified obesity type, unspecified whether serious comorbidity present    Prostate cancer screening  -     PSA, Screening; Future; Expected date: 03/03/2020                Plan:     Problem List Items Addressed This Visit        Cardiac/Vascular    Mixed hyperlipidemia    Overview      Cont current regimen atorvastatin is tolerating well          Benign essential hypertension - Primary    Overview     bp well controlled on current regimen             Renal/    Prostate cancer screening    Overview     Check psa             Endocrine    Class 2  obesity with body mass index (BMI) of 35.0 to 35.9 in adult    Overview     counciled on diet and exercise               As above, continue current medications and maintain follow up with specialists.  Return to clinic in 6  months.    Frederick W Dantagnan [facility]

## 2020-03-04 ENCOUNTER — PATIENT MESSAGE (OUTPATIENT)
Dept: FAMILY MEDICINE | Facility: CLINIC | Age: 75
End: 2020-03-04

## 2020-03-09 ENCOUNTER — PATIENT OUTREACH (OUTPATIENT)
Dept: ADMINISTRATIVE | Facility: OTHER | Age: 75
End: 2020-03-09

## 2020-03-09 ENCOUNTER — PATIENT MESSAGE (OUTPATIENT)
Dept: FAMILY MEDICINE | Facility: CLINIC | Age: 75
End: 2020-03-09

## 2020-03-09 DIAGNOSIS — R97.20 ELEVATED PSA: Primary | ICD-10-CM

## 2020-03-10 ENCOUNTER — OFFICE VISIT (OUTPATIENT)
Dept: UROLOGY | Facility: CLINIC | Age: 75
End: 2020-03-10
Payer: MEDICARE

## 2020-03-10 VITALS
BODY MASS INDEX: 34.86 KG/M2 | WEIGHT: 249 LBS | HEART RATE: 63 BPM | DIASTOLIC BLOOD PRESSURE: 78 MMHG | SYSTOLIC BLOOD PRESSURE: 123 MMHG | HEIGHT: 71 IN

## 2020-03-10 DIAGNOSIS — R97.20 ELEVATED PSA: ICD-10-CM

## 2020-03-10 DIAGNOSIS — N40.0 BPH WITHOUT OBSTRUCTION/LOWER URINARY TRACT SYMPTOMS: Primary | ICD-10-CM

## 2020-03-10 LAB
BILIRUB SERPL-MCNC: NORMAL MG/DL
BLOOD URINE, POC: NORMAL
COLOR, POC UA: YELLOW
GLUCOSE UR QL STRIP: NORMAL
KETONES UR QL STRIP: NORMAL
LEUKOCYTE ESTERASE URINE, POC: NORMAL
NITRITE, POC UA: NORMAL
PH, POC UA: 7
POC RESIDUAL URINE VOLUME: 2 ML (ref 0–100)
PROTEIN, POC: NORMAL
SPECIFIC GRAVITY, POC UA: 1.01
UROBILINOGEN, POC UA: NORMAL

## 2020-03-10 PROCEDURE — 99203 OFFICE O/P NEW LOW 30 MIN: CPT | Mod: 25,HCNC,S$GLB, | Performed by: NURSE PRACTITIONER

## 2020-03-10 PROCEDURE — 51798 US URINE CAPACITY MEASURE: CPT | Mod: HCNC,S$GLB,, | Performed by: NURSE PRACTITIONER

## 2020-03-10 PROCEDURE — 99999 PR PBB SHADOW E&M-EST. PATIENT-LVL III: CPT | Mod: PBBFAC,HCNC,, | Performed by: NURSE PRACTITIONER

## 2020-03-10 PROCEDURE — 1126F PR PAIN SEVERITY QUANTIFIED, NO PAIN PRESENT: ICD-10-PCS | Mod: HCNC,S$GLB,, | Performed by: NURSE PRACTITIONER

## 2020-03-10 PROCEDURE — 1159F PR MEDICATION LIST DOCUMENTED IN MEDICAL RECORD: ICD-10-PCS | Mod: HCNC,S$GLB,, | Performed by: NURSE PRACTITIONER

## 2020-03-10 PROCEDURE — 3078F PR MOST RECENT DIASTOLIC BLOOD PRESSURE < 80 MM HG: ICD-10-PCS | Mod: HCNC,CPTII,S$GLB, | Performed by: NURSE PRACTITIONER

## 2020-03-10 PROCEDURE — 99999 PR PBB SHADOW E&M-EST. PATIENT-LVL III: ICD-10-PCS | Mod: PBBFAC,HCNC,, | Performed by: NURSE PRACTITIONER

## 2020-03-10 PROCEDURE — 51798 POCT BLADDER SCAN: ICD-10-PCS | Mod: HCNC,S$GLB,, | Performed by: NURSE PRACTITIONER

## 2020-03-10 PROCEDURE — 3074F SYST BP LT 130 MM HG: CPT | Mod: HCNC,CPTII,S$GLB, | Performed by: NURSE PRACTITIONER

## 2020-03-10 PROCEDURE — 1159F MED LIST DOCD IN RCRD: CPT | Mod: HCNC,S$GLB,, | Performed by: NURSE PRACTITIONER

## 2020-03-10 PROCEDURE — 1126F AMNT PAIN NOTED NONE PRSNT: CPT | Mod: HCNC,S$GLB,, | Performed by: NURSE PRACTITIONER

## 2020-03-10 PROCEDURE — 1101F PT FALLS ASSESS-DOCD LE1/YR: CPT | Mod: HCNC,CPTII,S$GLB, | Performed by: NURSE PRACTITIONER

## 2020-03-10 PROCEDURE — 99203 PR OFFICE/OUTPT VISIT, NEW, LEVL III, 30-44 MIN: ICD-10-PCS | Mod: 25,HCNC,S$GLB, | Performed by: NURSE PRACTITIONER

## 2020-03-10 PROCEDURE — 81002 POCT URINE DIPSTICK WITHOUT MICROSCOPE: ICD-10-PCS | Mod: HCNC,S$GLB,, | Performed by: NURSE PRACTITIONER

## 2020-03-10 PROCEDURE — 81002 URINALYSIS NONAUTO W/O SCOPE: CPT | Mod: HCNC,S$GLB,, | Performed by: NURSE PRACTITIONER

## 2020-03-10 PROCEDURE — 1101F PR PT FALLS ASSESS DOC 0-1 FALLS W/OUT INJ PAST YR: ICD-10-PCS | Mod: HCNC,CPTII,S$GLB, | Performed by: NURSE PRACTITIONER

## 2020-03-10 PROCEDURE — 3074F PR MOST RECENT SYSTOLIC BLOOD PRESSURE < 130 MM HG: ICD-10-PCS | Mod: HCNC,CPTII,S$GLB, | Performed by: NURSE PRACTITIONER

## 2020-03-10 PROCEDURE — 3078F DIAST BP <80 MM HG: CPT | Mod: HCNC,CPTII,S$GLB, | Performed by: NURSE PRACTITIONER

## 2020-03-10 NOTE — PROGRESS NOTES
CHIEF COMPLAINT:    Mr. Sullivan is a 74 y.o. male presenting for elevated PSA  PRESENTING ILLNESS:    Peng Sullivan is a 74 y.o. male who presents for elevated PSA. This is his initial clinic visit. He is accompanied by his wife.    Today patient presents to clinic for elevated PSA.   Feeling of Incomplete Emptying: no  Frequency: no  Intermittency: no  Urgency: no  Weak Stream: no  Straining: no  Nocturia: no  Post void dribbling: no  Denies dysuria, hematuria or flank pain. Denies f/c/n/v.   Denies UUI or SINCERE.  Reports occasional split stream but denies any difficulty voiding with split stream.  Denies hx of UTI or prostatitis.  Denies family hx of prostate cancer.    He was following PCP at [facility] and transferred care to [facility].   Records in media  Past PSA:   2/21/19 PSA 3.83  2/20/18 PSA 2.65  2/15/17 PSA 2.26  2/5/16 PSA 2.04    REVIEW OF SYSTEMS:    Review of Systems    Constitutional: Negative for fever and chills.   HENT: Negative for hearing loss.   Eyes: Wears glasses. Negative for eye discharge.   Respiratory: Negative for shortness of breath.   Cardiovascular: Negative for chest pain.   Gastrointestinal: Negative for nausea, vomiting, and constipation.   Genitourinary:  See HPI   Neurological: Negative for dizziness.   Hematological: Does not bruise/bleed easily.   Psychiatric/Behavioral: Negative for confusion.       PATIENT HISTORY:    Past Medical History:   Diagnosis Date    Cataract     Diverticulosis     Hemorrhoids     High cholesterol     History of colon polyps 6/2/99, 6/3/19    Hypertension        Past Surgical History:   Procedure Laterality Date    APPENDECTOMY  01[birth year]01/1954    [facility]    COLONOSCOPY N/A 7/19/2019    Procedure: COLONOSCOPY/pos emr;  Surgeon: Shantanu Lee MD;  Location: [facility] ([address] [place] [address]);  Service: Endoscopy;  Laterality: N/A;  Referred by Dr. BERTO Keyes-see media tab for scanned records-      HT: 5'11, WT: 254.4,  BMI: 35.5       Family History   Problem Relation Age of Onset    Cancer Sister     Cataracts Sister     Heart attack Father     No Known Problems Mother     No Known Problems Sister     No Known Problems Sister     No Known Problems Sister     No Known Problems Sister     No Known Problems Brother     No Known Problems Maternal Aunt     No Known Problems Maternal Uncle     No Known Problems Paternal Aunt     No Known Problems Paternal Uncle     No Known Problems Maternal Grandmother     No Known Problems Maternal Grandfather     No Known Problems Paternal Grandmother     No Known Problems Paternal Grandfather     Amblyopia Neg Hx     Blindness Neg Hx     Diabetes Neg Hx     Glaucoma Neg Hx     Hypertension Neg Hx     Macular degeneration Neg Hx     Retinal detachment Neg Hx     Strabismus Neg Hx     Stroke Neg Hx     Thyroid disease Neg Hx        Social History     Socioeconomic History    Marital status:      Spouse name: Not on file    Number of children: Not on file    Years of education: Not on file    Highest education level: Not on file   Occupational History    Not on file   Social Needs    Financial resource strain: Not on file    Food insecurity:     Worry: Not on file     Inability: Not on file    Transportation needs:     Medical: Not on file     Non-medical: Not on file   Tobacco Use    Smoking status: Former Smoker     Packs/day: 0.50     Years: 30.00     Pack years: 15.00     Types: Cigarettes     Last attempt to quit:      Years since quittin.2    Smokeless tobacco: Former User     Types: Chew     Quit date:    Substance and Sexual Activity    Alcohol use: Yes     Binge frequency: Monthly     Comment: rarely    Drug use: No    Sexual activity: Not on file   Lifestyle    Physical activity:     Days per week: Not on file     Minutes per session: Not on file    Stress: Not at all   Relationships    Social connections:     Talks on phone: Not  on file     Gets together: Not on file     Attends Nondenominational service: Not on file     Active member of club or organization: Not on file     Attends meetings of clubs or organizations: Not on file     Relationship status: Not on file   Other Topics Concern    Not on file   Social History Narrative    Not on file       Allergies:  Patient has no known allergies.    Medications:    Current Outpatient Medications:     atorvastatin (LIPITOR) 20 MG tablet, Take 0.5 tablets (10 mg total) by mouth once daily., Disp: 90 tablet, Rfl: 3    hydroCHLOROthiazide (MICROZIDE) 12.5 mg capsule, Take 1 capsule (12.5 mg total) by mouth once daily., Disp: 90 capsule, Rfl: 3    losartan (COZAAR) 50 MG tablet, Take 1 tablet (50 mg total) by mouth once daily., Disp: 90 tablet, Rfl: 3    atorvastatin (LIPITOR) 10 MG tablet, Take 10 mg by mouth once daily., Disp: , Rfl:     hydroCHLOROthiazide (MICROZIDE) 12.5 mg capsule, hydrochlorothiazide 12.5 mg capsule, Disp: , Rfl:     losartan (COZAAR) 50 MG tablet, losartan 50 mg tablet, Disp: , Rfl:     PHYSICAL EXAMINATION:    Constitutional: He is oriented to person, place, and time. He appears well-developed and well-nourished.  He is in no apparent distress.    Neck: Normal ROM.     Cardiovascular: Normal rate.      Pulmonary/Chest: Effort normal. No respiratory distress.     Abdominal:  He exhibits no distension.  There is no CVA tenderness.     Lymphadenopathy:        Right: No supraclavicular adenopathy present.        Left: No supraclavicular adenopathy present.     Neurological: He is alert and oriented to person, place, and time.     Skin: Skin is warm and dry.     Extremities: Normal ROM    Psych: Cooperative with normal affect.    Genitourinary: The prostate is 40 g.  Prostate is smooth, non tender with no nodules noted.    Physical Exam      LABS:    U/a: sp grav 1.010, pH 7, negative    PVR: done in clinic with bladder scanner by Delmy WILKERSON was 2 ml.    Lab Results    Component Value Date    PSA 3.5 03/03/2020    PSA 1.0 12/26/2007     Lab Results   Component Value Date    CREATININE 0.85 08/23/2019         IMPRESSION:    Encounter Diagnoses   Name Primary?    BPH without obstruction/lower urinary tract symptoms Yes    Elevated PSA          PLAN:  -Reviewed all PSA results with patient   Elevated PSA:   The patient was counseled on the implications of an elevated PSA. It was explained in detail that this is a screening test and does not indicate any known presence of prostate cancer but that he is at increased risk given he has an elevated PSA. All his questions were answered by me fully.   -Patient would like to continue to monitor PSA rather than proceed with prostate biopsy at this time.   -PSA and free PSA in 6 months  -Will continue to monitor for LUTS/BPH since asymptomatic  -RTC 6 months    I spent 35 minutes with the patient of which more than half was spent in coordinating the patient's care as well as in direct consultation with the patient in regards to our treatment and plan.

## 2020-03-10 NOTE — LETTER
March 10, 2020      Joe Torres MD  [address] [address]  [address]  [facility] [address]           [facility] - Urology 4th Floor  [address]  [place] [address]  Phone: [phone]          Patient: Peng Sullivan   MR Number: [id number]   YOB: 1945   Date of Visit: 3/10/2020       Dear Dr. Joe Torres:    Thank you for referring Peng Sullivan to me for evaluation. Attached you will find relevant portions of my assessment and plan of care.    If you have questions, please do not hesitate to call me. I look forward to following Peng Sullivan along with you.    Sincerely,    Erica Handy, FRANCISCO    Enclosure  CC:  No Recipients    If you would like to receive this communication electronically, please contact [email or web address] or [phone] to request more information on [employer] access.    For providers and/or their staff who would like to refer a patient to [facility], please contact us through our one-stop-shop provider referral line, [facility], at [phone].    If you feel you have received this communication in error or would no longer like to receive these types of communications, please e-mail [email or web address]

## 2020-04-21 ENCOUNTER — OFFICE VISIT (OUTPATIENT)
Dept: FAMILY MEDICINE | Facility: CLINIC | Age: 75
End: 2020-04-21
Payer: MEDICARE

## 2020-04-21 ENCOUNTER — TELEPHONE (OUTPATIENT)
Dept: FAMILY MEDICINE | Facility: CLINIC | Age: 75
End: 2020-04-21

## 2020-04-21 DIAGNOSIS — J30.1 SEASONAL ALLERGIC RHINITIS DUE TO POLLEN: Primary | ICD-10-CM

## 2020-04-21 DIAGNOSIS — J30.1 SEASONAL ALLERGIC RHINITIS DUE TO POLLEN: ICD-10-CM

## 2020-04-21 PROCEDURE — 1159F PR MEDICATION LIST DOCUMENTED IN MEDICAL RECORD: ICD-10-PCS | Mod: HCNC,95,, | Performed by: INTERNAL MEDICINE

## 2020-04-21 PROCEDURE — 1101F PR PT FALLS ASSESS DOC 0-1 FALLS W/OUT INJ PAST YR: ICD-10-PCS | Mod: HCNC,CPTII,95, | Performed by: INTERNAL MEDICINE

## 2020-04-21 PROCEDURE — 1159F MED LIST DOCD IN RCRD: CPT | Mod: HCNC,95,, | Performed by: INTERNAL MEDICINE

## 2020-04-21 PROCEDURE — 99213 OFFICE O/P EST LOW 20 MIN: CPT | Mod: HCNC,95,, | Performed by: INTERNAL MEDICINE

## 2020-04-21 PROCEDURE — 1101F PT FALLS ASSESS-DOCD LE1/YR: CPT | Mod: HCNC,CPTII,95, | Performed by: INTERNAL MEDICINE

## 2020-04-21 PROCEDURE — 99213 PR OFFICE/OUTPT VISIT, EST, LEVL III, 20-29 MIN: ICD-10-PCS | Mod: HCNC,95,, | Performed by: INTERNAL MEDICINE

## 2020-04-21 RX ORDER — FLUTICASONE PROPIONATE 50 MCG
1 SPRAY, SUSPENSION (ML) NASAL DAILY
Qty: 9.9 ML | Refills: 1 | Status: SHIPPED | OUTPATIENT
Start: 2020-04-21 | End: 2020-04-21

## 2020-04-21 RX ORDER — FLUTICASONE PROPIONATE 50 MCG
SPRAY, SUSPENSION (ML) NASAL
Qty: 48 G | Refills: 1 | Status: SHIPPED | OUTPATIENT
Start: 2020-04-21 | End: 2020-09-01

## 2020-04-21 NOTE — PROGRESS NOTES
"The patient location is: home   The chief complaint leading to consultation is:   Visit type: audio visual tele visit  Total time spent with patient: 10 min  Each patient to whom he or she provides medical services by telemedicine is:  (1) informed of the relationship between the physician and patient and the respective role of any other health care provider with respect to management of the patient; and (2) notified that he or she may decline to receive medical services by telemedicine and may withdraw from such care at any time.      [facility] Note    Chief Complaint    cc " runny eyes and runny nose "     History of Present Illness      Peng Sullivan is a 75 y.o. male who presents today for No chief complaint on file.  .  Patient comes to appointment here for acute visit related to a 4-5 week history of watery right eye and rhinorrhea .  He denies fever denies cough . Is just complaining of typical allergy type symptoms . He denies  redness , discharge or early morning crust formation on right eye    HPI    No problem-specific Assessment & Plan notes found for this encounter.       Problem List Items Addressed This Visit        Other    Seasonal allergic rhinitis due to pollen - Primary    Overview     Cont antihistamine   Start Flonase and rec otc optho anti allergy drops   He will call back if no resolution                 Past Medical History:  Past Medical History:   Diagnosis Date    Cataract     Diverticulosis     Hemorrhoids     High cholesterol     History of colon polyps 6/2/99, 6/3/19    Hypertension        Past Surgical History:  Past Surgical History:   Procedure Laterality Date    APPENDECTOMY  01/01/1954    [facility]/03/2019    [facility]    COLONOSCOPY N/A 7/19/2019    Procedure: COLONOSCOPY/pos emr;  Surgeon: Shantanu Lee MD;  Location: [address];  Service: Endoscopy;  Laterality: N/A;  Referred by Dr. BERTO Keyes-see media tab " for scanned records-      HT: 5, WT: 254.4, BMI: 35.5       Family History:  family history includes Cancer in his sister; Cataracts in his sister; Heart attack in his father; No Known Problems in his brother, maternal aunt, maternal grandfather, maternal grandmother, maternal uncle, mother, paternal aunt, paternal grandfather, paternal grandmother, paternal uncle, sister, sister, sister, and sister.     Social History:  Social History     Socioeconomic History    Marital status:      Spouse name: Not on file    Number of children: Not on file    Years of education: Not on file    Highest education level: Not on file   Occupational History    Not on file   Social Needs    Financial resource strain: Not on file    Food insecurity:     Worry: Not on file     Inability: Not on file    Transportation needs:     Medical: Not on file     Non-medical: Not on file   Tobacco Use    Smoking status: Former Smoker     Packs/day: 0.50     Years: 30.00     Pack years: 15.00     Types: Cigarettes     Last attempt to quit:      Years since quittin.3    Smokeless tobacco: Former User     Types: Chew     Quit date:    Substance and Sexual Activity    Alcohol use: Yes     Binge frequency: Monthly     Comment: rarely    Drug use: No    Sexual activity: Not on file   Lifestyle    Physical activity:     Days per week: Not on file     Minutes per session: Not on file    Stress: Not at all   Relationships    Social connections:     Talks on phone: Not on file     Gets together: Not on file     Attends Episcopal service: Not on file     Active member of club or organization: Not on file     Attends meetings of clubs or organizations: Not on file     Relationship status: Not on file   Other Topics Concern    Not on file   Social History Narrative    Not on file       Review of Systems:   Review of Systems   Constitutional: Negative for chills and fever.   HENT: Negative for congestion and  nosebleeds.    Eyes: Negative for blurred vision, pain, discharge and redness.   Respiratory: Negative for cough.    Cardiovascular: Negative for chest pain.   Gastrointestinal: Negative for constipation, diarrhea and heartburn.   Genitourinary: Negative for dysuria.   Skin: Negative for itching and rash.   Neurological: Negative for headaches.   Psychiatric/Behavioral: The patient is not nervous/anxious.         Medications:  Outpatient Encounter Medications as of 4/21/2020   Medication Sig Dispense Refill    atorvastatin (LIPITOR) 10 MG tablet Take 10 mg by mouth once daily.      atorvastatin (LIPITOR) 20 MG tablet Take 0.5 tablets (10 mg total) by mouth once daily. 90 tablet 3    fluticasone propionate (FLONASE) 50 mcg/actuation nasal spray 1 spray (50 mcg total) by Each Nostril route once daily. 9.9 mL 1    hydroCHLOROthiazide (MICROZIDE) 12.5 mg capsule Take 1 capsule (12.5 mg total) by mouth once daily. 90 capsule 3    hydroCHLOROthiazide (MICROZIDE) 12.5 mg capsule hydrochlorothiazide 12.5 mg capsule      losartan (COZAAR) 50 MG tablet Take 1 tablet (50 mg total) by mouth once daily. 90 tablet 3    losartan (COZAAR) 50 MG tablet losartan 50 mg tablet       No facility-administered encounter medications on file as of 4/21/2020.        Allergies:  Review of patient's allergies indicates:  No Known Allergies      Physical Exam      There were no vitals filed for this visit.      ]     There is no height or weight on file to calculate BMI.    Physical Exam   Constitutional: He is oriented to person, place, and time. He appears well-developed and well-nourished. No distress.   Eyes: Pupils are equal, round, and reactive to light. Conjunctivae and EOM are normal. Right eye exhibits no discharge. Left eye exhibits no discharge.   Pulmonary/Chest: Effort normal.   Neurological: He is alert and oriented to person, place, and time.   Skin: He is not diaphoretic.   Psychiatric: His behavior is normal. Judgment  and thought content normal.        Laboratory:  CBC:  No results for input(s): WBC, RBC, HGB, HCT, PLT, MCV, MCH, MCHC in the last 2160 hours.  CMP:  No results for input(s): GLU, CALCIUM, ALBUMIN, PROT, NA, K, CO2, CL, BUN, ALKPHOS, ALT, AST, BILITOT in the last 2160 hours.    Invalid input(s): CREATININ  URINALYSIS:  Recent Labs   Lab Result Units 03/10/20  0858   Color, UA  yellow   Spec Grav UA  1.010   pH, UA  7   Nitrite, UA  n   Urobilinogen, UA  n      LIPIDS:  No results for input(s): TSH, HDL, CHOL, TRIG, LDLCALC, CHOLHDL, NONHDLCHOL, TOTALCHOLEST in the last 2160 hours.  TSH:  No results for input(s): TSH in the last 2160 hours.  A1C:  No results for input(s): HGBA1C in the last 2160 hours.    Radiology:        Assessment:     Peng Sullivan is a 75 y.o.male with:    Seasonal allergic rhinitis due to pollen  -     fluticasone propionate (FLONASE) 50 mcg/actuation nasal spray; 1 spray (50 mcg total) by Each Nostril route once daily.  Dispense: 9.9 mL; Refill: 1                Plan:     Problem List Items Addressed This Visit        Other    Seasonal allergic rhinitis due to pollen - Primary    Overview     Cont antihistamine   Start Flonase and rec otc optho anti allergy drops   He will call back if no resolution                As above, continue current medications and maintain follow up with specialists.  Return to clinic as scheduled     [facility]er[facility] W Dantagnan [facility] [place]

## 2020-04-21 NOTE — TELEPHONE ENCOUNTER
----- Message from Radha Oneal sent at 4/21/2020  8:54 AM CDT -----  Contact: Self [phone]  Patient is calling to schedule a virtual visit for today due to possible pink eye and sinus infection.

## 2020-07-02 ENCOUNTER — TELEPHONE (OUTPATIENT)
Dept: FAMILY MEDICINE | Facility: CLINIC | Age: 75
End: 2020-07-02

## 2020-07-02 NOTE — TELEPHONE ENCOUNTER
As long is he is not having severe abd pain, fever, blood in stool or any other concerning symptoms; just monitor for now.  If hasn't resolved on it's own in 3-5 days, may need further testing.      Make sure to drink plenty water/gatorade/powerade to replace fluids lost with the diarrhea

## 2020-07-02 NOTE — TELEPHONE ENCOUNTER
Patient c/o diarrhea ,states that it started this morning . Patient states he was around  a group of people Sunday with no known  symptoms. Patient states he do not have any other symptoms and will like to know what should he do

## 2020-07-02 NOTE — TELEPHONE ENCOUNTER
----- Message from Sena Avery sent at 7/2/2020  9:19 AM CDT -----  Contact: SELF [phone]  Patient would like to speak with you about having diarrhea. Please advise

## 2020-09-01 ENCOUNTER — OFFICE VISIT (OUTPATIENT)
Dept: FAMILY MEDICINE | Facility: CLINIC | Age: 75
End: 2020-09-01
Payer: MEDICARE

## 2020-09-01 VITALS
HEART RATE: 65 BPM | TEMPERATURE: 99 F | HEIGHT: 71 IN | OXYGEN SATURATION: 97 % | BODY MASS INDEX: 36.08 KG/M2 | DIASTOLIC BLOOD PRESSURE: 72 MMHG | WEIGHT: 257.69 LBS | SYSTOLIC BLOOD PRESSURE: 110 MMHG | RESPIRATION RATE: 18 BRPM

## 2020-09-01 DIAGNOSIS — I10 BENIGN ESSENTIAL HYPERTENSION: Primary | ICD-10-CM

## 2020-09-01 DIAGNOSIS — E78.2 MIXED HYPERLIPIDEMIA: ICD-10-CM

## 2020-09-01 DIAGNOSIS — E66.9 CLASS 2 OBESITY WITH BODY MASS INDEX (BMI) OF 35.0 TO 35.9 IN ADULT, UNSPECIFIED OBESITY TYPE, UNSPECIFIED WHETHER SERIOUS COMORBIDITY PRESENT: ICD-10-CM

## 2020-09-01 DIAGNOSIS — J30.1 SEASONAL ALLERGIC RHINITIS DUE TO POLLEN: ICD-10-CM

## 2020-09-01 PROCEDURE — 1159F PR MEDICATION LIST DOCUMENTED IN MEDICAL RECORD: ICD-10-PCS | Mod: HCNC,S$GLB,, | Performed by: INTERNAL MEDICINE

## 2020-09-01 PROCEDURE — 3078F PR MOST RECENT DIASTOLIC BLOOD PRESSURE < 80 MM HG: ICD-10-PCS | Mod: HCNC,CPTII,S$GLB, | Performed by: INTERNAL MEDICINE

## 2020-09-01 PROCEDURE — 99499 RISK ADDL DX/OHS AUDIT: ICD-10-PCS | Mod: S$GLB,,, | Performed by: INTERNAL MEDICINE

## 2020-09-01 PROCEDURE — 1101F PR PT FALLS ASSESS DOC 0-1 FALLS W/OUT INJ PAST YR: ICD-10-PCS | Mod: HCNC,CPTII,S$GLB, | Performed by: INTERNAL MEDICINE

## 2020-09-01 PROCEDURE — 1126F PR PAIN SEVERITY QUANTIFIED, NO PAIN PRESENT: ICD-10-PCS | Mod: HCNC,S$GLB,, | Performed by: INTERNAL MEDICINE

## 2020-09-01 PROCEDURE — 99999 PR PBB SHADOW E&M-EST. PATIENT-LVL IV: CPT | Mod: PBBFAC,HCNC,, | Performed by: INTERNAL MEDICINE

## 2020-09-01 PROCEDURE — 1126F AMNT PAIN NOTED NONE PRSNT: CPT | Mod: HCNC,S$GLB,, | Performed by: INTERNAL MEDICINE

## 2020-09-01 PROCEDURE — 99499 UNLISTED E&M SERVICE: CPT | Mod: S$GLB,,, | Performed by: INTERNAL MEDICINE

## 2020-09-01 PROCEDURE — 3074F SYST BP LT 130 MM HG: CPT | Mod: HCNC,CPTII,S$GLB, | Performed by: INTERNAL MEDICINE

## 2020-09-01 PROCEDURE — 3074F PR MOST RECENT SYSTOLIC BLOOD PRESSURE < 130 MM HG: ICD-10-PCS | Mod: HCNC,CPTII,S$GLB, | Performed by: INTERNAL MEDICINE

## 2020-09-01 PROCEDURE — 1101F PT FALLS ASSESS-DOCD LE1/YR: CPT | Mod: HCNC,CPTII,S$GLB, | Performed by: INTERNAL MEDICINE

## 2020-09-01 PROCEDURE — 99999 PR PBB SHADOW E&M-EST. PATIENT-LVL IV: ICD-10-PCS | Mod: PBBFAC,HCNC,, | Performed by: INTERNAL MEDICINE

## 2020-09-01 PROCEDURE — 1159F MED LIST DOCD IN RCRD: CPT | Mod: HCNC,S$GLB,, | Performed by: INTERNAL MEDICINE

## 2020-09-01 PROCEDURE — 3078F DIAST BP <80 MM HG: CPT | Mod: HCNC,CPTII,S$GLB, | Performed by: INTERNAL MEDICINE

## 2020-09-01 PROCEDURE — 99214 OFFICE O/P EST MOD 30 MIN: CPT | Mod: HCNC,S$GLB,, | Performed by: INTERNAL MEDICINE

## 2020-09-01 PROCEDURE — 99214 PR OFFICE/OUTPT VISIT, EST, LEVL IV, 30-39 MIN: ICD-10-PCS | Mod: HCNC,S$GLB,, | Performed by: INTERNAL MEDICINE

## 2020-09-01 RX ORDER — VIT C/E/ZN/COPPR/LUTEIN/ZEAXAN 250MG-90MG
CAPSULE ORAL
COMMUNITY
Start: 2020-03-30

## 2020-09-01 NOTE — PROGRESS NOTES
Patient, Peng Sullivan (MRN #[id number]), presented with a recorded BMI of 35.94 kg/m^2 and a documented comorbidity(s):  - Hypertension  - Hyperlipidemia  to which the severe obesity is a contributing factor. This is consistent with the definition of severe obesity (BMI 35.0-39.9) with comorbidity (ICD-10 E66.01, Z68.35). The patient's severe obesity was monitored, evaluated, addressed and/or treated. This addendum to the medical record is made on 09/01/2020.

## 2020-09-01 NOTE — PROGRESS NOTES
[facility] Note    Chief Complaint      Chief Complaint   Patient presents with    Follow-up     6 months F/U       History of Present Illness      Peng Sullivan is a 75 y.o. male who presents today for   Chief Complaint   Patient presents with    Follow-up     6 months F/U   .  Patient comes to appointment here for 6m checkup for chronic issues as discussed in detail below . He is seeing dr cerda urology currently for elevated psa he has blood work ordered for him today . He is currently feeling well I ude for his labs with me this month as well . He remains active with walking for exercise.     HPI    No problem-specific Assessment & Plan notes found for this encounter.       Problem List Items Addressed This Visit        Cardiac/Vascular    Mixed hyperlipidemia    Overview      Cont current regimen atorvastatin is tolerating well needs cmp and lipid          Benign essential hypertension - Primary    Overview     bp well controlled on current regimen             Endocrine    Class 2 obesity with body mass index (BMI) of 35.0 to 35.9 in adult    Overview     counciled on diet and exercise            Other    Seasonal allergic rhinitis due to pollen    Overview     Cont antihistamine and flonase                  Past Medical History:  Past Medical History:   Diagnosis Date    Cataract     Diverticulosis     Hemorrhoids     High cholesterol     History of colon polyps 6/2/99, 6/3/19    Hypertension        Past Surgical History:  Past Surgical History:   Procedure Laterality Date    APPENDECTOMY  01/01/1954    [facility]    COLONOSCOPY N/A 7/19/2019    Procedure: COLONOSCOPY/pos emr;  Surgeon: Shantanu Lee MD;  Location: [address]);  Service: Endoscopy;  Laterality: N/A;  Referred by Dr. BERTO Keyes-see media tab for scanned records-      HT: 5'11, WT: 254.4, BMI: 35.5       Family History:  family history includes Cancer in  his sister; Cataracts in his sister; Heart attack in his father; No Known Problems in his brother, maternal aunt, maternal grandfather, maternal grandmother, maternal uncle, mother, paternal aunt, paternal grandfather, paternal grandmother, paternal uncle, sister, sister, sister, and sister.     Social History:  Social History     Socioeconomic History    Marital status:      Spouse name: Not on file    Number of children: Not on file    Years of education: Not on file    Highest education level: Not on file   Occupational History    Not on file   Social Needs    Financial resource strain: Not hard at all    Food insecurity     Worry: Never true     Inability: Never true    Transportation needs     Medical: No     Non-medical: No   Tobacco Use    Smoking status: Former Smoker     Packs/day: 0.50     Years: 30.00     Pack years: 15.00     Types: Cigarettes     Quit date:      Years since quittin.    Smokeless tobacco: Former User     Types: Chew     Quit date:    Substance and Sexual Activity    Alcohol use: Yes     Frequency: Monthly or less     Drinks per session: Patient refused     Binge frequency: Never     Comment: rarely    Drug use: No    Sexual activity: Not on file   Lifestyle    Physical activity     Days per week: 6 days     Minutes per session: 30 min    Stress: Not at all   Relationships    Social connections     Talks on phone: More than three times a week     Gets together: Patient refused     Attends Episcopal service: Not on file     Active member of club or organization: No     Attends meetings of clubs or organizations: Never     Relationship status:    Other Topics Concern    Not on file   Social History Narrative    Not on file       Review of Systems:   Review of Systems   HENT: Positive for hearing loss.    Eyes: Negative for discharge.   Respiratory: Negative for wheezing.    Cardiovascular: Negative for chest pain and palpitations.    Gastrointestinal: Negative for blood in stool, constipation, diarrhea and vomiting.   Genitourinary: Negative for hematuria.   Musculoskeletal: Negative for neck pain.   Neurological: Negative for weakness and headaches.   Endo/Heme/Allergies: Negative for polydipsia.   Psychiatric/Behavioral: Negative for depression. The patient does not have insomnia.         Medications:  Outpatient Encounter Medications as of 9/1/2020   Medication Sig Dispense Refill    atorvastatin (LIPITOR) 10 MG tablet Take 10 mg by mouth once daily.      cholecalciferol, vitamin D3, (VITAMIN D3) 25 mcg (1,000 unit) capsule       hydroCHLOROthiazide (MICROZIDE) 12.5 mg capsule hydrochlorothiazide 12.5 mg capsule      losartan (COZAAR) 50 MG tablet TAKE 1 TABLET (50 MG TOTAL) BY MOUTH ONCE DAILY. 90 tablet 1    [DISCONTINUED] atorvastatin (LIPITOR) 20 MG tablet Take 0.5 tablets (10 mg total) by mouth once daily. 90 tablet 3    [DISCONTINUED] fluticasone propionate (FLONASE) 50 mcg/actuation nasal spray SHAKE LIQUID AND USE 1 SPRAY(50 MCG) IN EACH NOSTRIL EVERY DAY 48 g 1    [DISCONTINUED] hydroCHLOROthiazide (MICROZIDE) 12.5 mg capsule Take 1 capsule (12.5 mg total) by mouth once daily. 90 capsule 3    [DISCONTINUED] hydroCHLOROthiazide (MICROZIDE) 12.5 mg capsule TAKE 1 CAPSULE EVERY DAY 90 capsule 1    [DISCONTINUED] losartan (COZAAR) 50 MG tablet Take 1 tablet (50 mg total) by mouth once daily. 90 tablet 3    [DISCONTINUED] losartan (COZAAR) 50 MG tablet losartan 50 mg tablet       No facility-administered encounter medications on file as of 9/1/2020.        Allergies:  Review of patient's allergies indicates:  No Known Allergies      Physical Exam      Vitals:    09/01/20 0808   BP: 110/72   Pulse: 65   Resp: 18   Temp: 98.7 °F (37.1 °C)        Vital Signs  Temp: 98.7 °F (37.1 °C)  Temp src: Oral  Pulse: 65  Resp: 18  SpO2: 97 %  BP: 110/72  BP Location: Right arm  Patient Position: Sitting  Pain Score: 0-No pain  Height and  "Weight  Height: 5' 11" (180.3 cm)  Weight: 116.9 kg (257 lb 11.5 oz)  BSA (Calculated - sq m): 2.42 sq meters  BMI (Calculated): 36  Weight in (lb) to have BMI = 25: 178.9]     Body mass index is 35.94 kg/m².    Physical Exam  Constitutional:       Appearance: He is well-developed.   HENT:      Head: Normocephalic.   Eyes:      Pupils: Pupils are equal, round, and reactive to light.   Neck:      Musculoskeletal: Normal range of motion.      Thyroid: No thyromegaly.   Cardiovascular:      Rate and Rhythm: Normal rate and regular rhythm.      Heart sounds: No murmur. No friction rub. No gallop.    Pulmonary:      Effort: Pulmonary effort is normal.      Breath sounds: Normal breath sounds.   Abdominal:      General: Bowel sounds are normal.      Palpations: Abdomen is soft.   Musculoskeletal: Normal range of motion.   Skin:     General: Skin is warm and dry.   Neurological:      Mental Status: He is alert and oriented to person, place, and time.      Sensory: No sensory deficit.   Psychiatric:         Behavior: Behavior normal.          Laboratory:  CBC:  No results for input(s): WBC, RBC, HGB, HCT, PLT, MCV, MCH, MCHC in the last 2160 hours.  CMP:  No results for input(s): GLU, CALCIUM, ALBUMIN, PROT, NA, K, CO2, CL, BUN, ALKPHOS, ALT, AST, BILITOT in the last 2160 hours.    Invalid input(s): CREATININ  URINALYSIS:  No results for input(s): COLORU, CLARITYU, SPECGRAV, PHUR, PROTEINUA, GLUCOSEU, BILIRUBINCON, BLOODU, WBCU, RBCU, BACTERIA, MUCUS, NITRITE, LEUKOCYTESUR, UROBILINOGEN, HYALINECASTS in the last 2160 hours.   LIPIDS:  No results for input(s): TSH, HDL, CHOL, TRIG, LDLCALC, CHOLHDL, NONHDLCHOL, TOTALCHOLEST in the last 2160 hours.  TSH:  No results for input(s): TSH in the last 2160 hours.  A1C:  No results for input(s): HGBA1C in the last 2160 hours.    Radiology:        Assessment:     Peng Sullivan is a 75 y.o.male with:    Benign essential hypertension  -     CBC auto differential; Future; Expected " date: 09/01/2020    Mixed hyperlipidemia  -     Comprehensive metabolic panel; Future; Expected date: 09/01/2020  -     Lipid Panel; Future; Expected date: 09/01/2020    Seasonal allergic rhinitis due to pollen    Class 2 obesity with body mass index (BMI) of 35.0 to 35.9 in adult, unspecified obesity type, unspecified whether serious comorbidity present                Plan:     Problem List Items Addressed This Visit        Cardiac/Vascular    Mixed hyperlipidemia    Overview      Cont current regimen atorvastatin is tolerating well needs cmp and lipid          Benign essential hypertension - Primary    Overview     bp well controlled on current regimen             Endocrine    Class 2 obesity with body mass index (BMI) of 35.0 to 35.9 in adult    Overview     counciled on diet and exercise            Other    Seasonal allergic rhinitis due to pollen    Overview     Cont antihistamine and flonase                 As above, continue current medications and maintain follow up with specialists.  Return to clinic in 6  months.      [facility]er[facility] W Dantagnan [facility] [place]                  Answers for HPI/ROS submitted by the patient on 8/30/2020   activity change: No  unexpected weight change: No  rhinorrhea: Yes  trouble swallowing: No  visual disturbance: No  chest tightness: No  polyuria: No  difficulty urinating: No  joint swelling: No  arthralgias: No  confusion: No  dysphoric mood: No

## 2020-09-18 ENCOUNTER — PATIENT OUTREACH (OUTPATIENT)
Dept: ADMINISTRATIVE | Facility: OTHER | Age: 75
End: 2020-09-18

## 2020-09-18 NOTE — PROGRESS NOTES
LINKS immunization registry updated  Care Everywhere updated  Health Maintenance updated  Chart reviewed for overdue [facility]active [facility] ([facility]) health maintenance testing (CRS, Breast Ca, Diabetic Eye Exam)   Orders entered:N/A

## 2020-09-21 ENCOUNTER — OFFICE VISIT (OUTPATIENT)
Dept: UROLOGY | Facility: CLINIC | Age: 75
End: 2020-09-21
Payer: MEDICARE

## 2020-09-21 VITALS
BODY MASS INDEX: 35.7 KG/M2 | HEART RATE: 73 BPM | HEIGHT: 71 IN | WEIGHT: 255 LBS | DIASTOLIC BLOOD PRESSURE: 80 MMHG | SYSTOLIC BLOOD PRESSURE: 143 MMHG

## 2020-09-21 DIAGNOSIS — R97.20 ELEVATED PSA: ICD-10-CM

## 2020-09-21 DIAGNOSIS — N13.8 BPH WITH URINARY OBSTRUCTION: Primary | ICD-10-CM

## 2020-09-21 DIAGNOSIS — N40.1 BPH WITH URINARY OBSTRUCTION: Primary | ICD-10-CM

## 2020-09-21 PROCEDURE — 99999 PR PBB SHADOW E&M-EST. PATIENT-LVL IV: CPT | Mod: PBBFAC,HCNC,, | Performed by: NURSE PRACTITIONER

## 2020-09-21 PROCEDURE — 1101F PT FALLS ASSESS-DOCD LE1/YR: CPT | Mod: HCNC,CPTII,S$GLB, | Performed by: NURSE PRACTITIONER

## 2020-09-21 PROCEDURE — 3077F PR MOST RECENT SYSTOLIC BLOOD PRESSURE >= 140 MM HG: ICD-10-PCS | Mod: HCNC,CPTII,S$GLB, | Performed by: NURSE PRACTITIONER

## 2020-09-21 PROCEDURE — 1126F PR PAIN SEVERITY QUANTIFIED, NO PAIN PRESENT: ICD-10-PCS | Mod: HCNC,S$GLB,, | Performed by: NURSE PRACTITIONER

## 2020-09-21 PROCEDURE — 3077F SYST BP >= 140 MM HG: CPT | Mod: HCNC,CPTII,S$GLB, | Performed by: NURSE PRACTITIONER

## 2020-09-21 PROCEDURE — 1101F PR PT FALLS ASSESS DOC 0-1 FALLS W/OUT INJ PAST YR: ICD-10-PCS | Mod: HCNC,CPTII,S$GLB, | Performed by: NURSE PRACTITIONER

## 2020-09-21 PROCEDURE — 99214 OFFICE O/P EST MOD 30 MIN: CPT | Mod: HCNC,S$GLB,, | Performed by: NURSE PRACTITIONER

## 2020-09-21 PROCEDURE — 1159F PR MEDICATION LIST DOCUMENTED IN MEDICAL RECORD: ICD-10-PCS | Mod: HCNC,S$GLB,, | Performed by: NURSE PRACTITIONER

## 2020-09-21 PROCEDURE — 99214 PR OFFICE/OUTPT VISIT, EST, LEVL IV, 30-39 MIN: ICD-10-PCS | Mod: HCNC,S$GLB,, | Performed by: NURSE PRACTITIONER

## 2020-09-21 PROCEDURE — 1126F AMNT PAIN NOTED NONE PRSNT: CPT | Mod: HCNC,S$GLB,, | Performed by: NURSE PRACTITIONER

## 2020-09-21 PROCEDURE — 1159F MED LIST DOCD IN RCRD: CPT | Mod: HCNC,S$GLB,, | Performed by: NURSE PRACTITIONER

## 2020-09-21 PROCEDURE — 3079F DIAST BP 80-89 MM HG: CPT | Mod: HCNC,CPTII,S$GLB, | Performed by: NURSE PRACTITIONER

## 2020-09-21 PROCEDURE — 99999 PR PBB SHADOW E&M-EST. PATIENT-LVL IV: ICD-10-PCS | Mod: PBBFAC,HCNC,, | Performed by: NURSE PRACTITIONER

## 2020-09-21 PROCEDURE — 3079F PR MOST RECENT DIASTOLIC BLOOD PRESSURE 80-89 MM HG: ICD-10-PCS | Mod: HCNC,CPTII,S$GLB, | Performed by: NURSE PRACTITIONER

## 2020-09-21 NOTE — PROGRESS NOTES
CHIEF COMPLAINT:    Peng Sullivan is a 75 y.o. male with elevated PSA.    HISTORY OF PRESENTING ILLINESS:    Peng Sullivan is a 75 y.o. male with BPH and elevated PSA.  He was last seen in clinic 03/10/2020 with Jill Reyes, NP-C  He is a new patient to me but I reviewed his past & current medical records.    He denies any urinary complaints  Occasionally will have a split stream.  Nocturia is 0.  No family history of prostate cancer.           REVIEW OF SYSTEMS:  Review of Systems   Constitutional: Negative.  Negative for chills, diaphoresis and fever.   HENT: Negative for congestion and sore throat.    Eyes: Negative.  Negative for double vision.   Respiratory: Negative.  Negative for cough, shortness of breath and wheezing.    Cardiovascular: Negative.  Negative for chest pain, palpitations and leg swelling.   Gastrointestinal: Negative.  Negative for abdominal pain, blood in stool, constipation, diarrhea, nausea and vomiting.   Genitourinary: Negative.  Negative for dysuria, flank pain and hematuria.        Ok with urination     Musculoskeletal: Negative.  Negative for back pain, falls and neck pain.   Skin: Negative.  Negative for rash.   Neurological: Negative.  Negative for dizziness and seizures.   Endo/Heme/Allergies: Does not bruise/bleed easily.   Psychiatric/Behavioral: Negative.  Negative for depression. The patient is not nervous/anxious.          PATIENT HISTORY:    Past Medical History:   Diagnosis Date    Cataract     Diverticulosis     Hemorrhoids     High cholesterol     History of colon polyps 6/2/99, 6/3/19    Hypertension        Past Surgical History:   Procedure Laterality Date    APPENDECTOMY  01/01/1954    [facility]  06/03/2019    [facility]    COLONOSCOPY N/A 7/19/2019    Procedure: COLONOSCOPY/pos emr;  Surgeon: Shantanu Lee MD;  Location: [facility] ([address] [place] [address]);  Service: Endoscopy;  Laterality: N/A;  Referred by Dr. BERTO Keyes-see media tab for scanned  [facility]-      HT: 5'11, WT: 254.4, BMI: 35.5       Family History   Problem Relation Age of Onset    Cancer Sister     Cataracts Sister     Heart attack Father     No Known Problems Mother     No Known Problems Sister     No Known Problems Sister     No Known Problems Sister     No Known Problems Sister     No Known Problems Brother     No Known Problems Maternal Aunt     No Known Problems Maternal Uncle     No Known Problems Paternal Aunt     No Known Problems Paternal Uncle     No Known Problems Maternal Grandmother     No Known Problems Maternal Grandfather     No Known Problems Paternal Grandmother     No Known Problems Paternal Grandfather     Amblyopia Neg Hx     Blindness Neg Hx     Diabetes Neg Hx     Glaucoma Neg Hx     Hypertension Neg Hx     Macular degeneration Neg Hx     Retinal detachment Neg Hx     Strabismus Neg Hx     Stroke Neg Hx     Thyroid disease Neg Hx        Social History     Socioeconomic History    Marital status:      Spouse name: Not on file    Number of children: Not on file    Years of education: Not on file    Highest education level: Not on file   Occupational History    Not on file   Social Needs    Financial resource strain: Not hard at all    Food insecurity     Worry: Never true     Inability: Never true    Transportation needs     Medical: No     Non-medical: No   Tobacco Use    Smoking status: Former Smoker     Packs/day: 0.50     Years: 30.00     Pack years: 15.00     Types: Cigarettes     Quit date:      Years since quittin.7    Smokeless tobacco: Former User     Types: Chew     Quit date:    Substance and Sexual Activity    Alcohol use: Yes     Frequency: Monthly or less     Drinks per session: Patient refused     Binge frequency: Never     Comment: rarely    Drug use: No    Sexual activity: Not on file   Lifestyle    Physical activity     Days per week: 6 days     Minutes per session: 30 min    Stress: Not at  all   Relationships    Social connections     Talks on phone: More than three times a week     Gets together: Patient refused     Attends Shinto service: Not on file     Active member of club or organization: No     Attends meetings of clubs or organizations: Never     Relationship status:    Other Topics Concern    Not on file   Social History Narrative    Not on file       Allergies:  Patient has no known allergies.    Medications:    Current Outpatient Medications:     atorvastatin (LIPITOR) 10 MG tablet, Take 10 mg by mouth once daily., Disp: , Rfl:     cholecalciferol, vitamin D3, (VITAMIN D3) 25 mcg (1,000 unit) capsule, , Disp: , Rfl:     hydroCHLOROthiazide (MICROZIDE) 12.5 mg capsule, hydrochlorothiazide 12.5 mg capsule, Disp: , Rfl:     losartan (COZAAR) 50 MG tablet, TAKE 1 TABLET (50 MG TOTAL) BY MOUTH ONCE DAILY., Disp: 90 tablet, Rfl: 1    PHYSICAL EXAMINATION:  Physical Exam  Vitals signs and nursing note reviewed.   Constitutional:       General: He is awake.      Appearance: Normal appearance. He is normal weight.   HENT:      Head: Normocephalic.      Right Ear: External ear normal.      Left Ear: External ear normal.      Nose: Nose normal.   Eyes:      Conjunctiva/sclera: Conjunctivae normal.   Neck:      Musculoskeletal: Normal range of motion.   Cardiovascular:      Rate and Rhythm: Normal rate.   Pulmonary:      Effort: Pulmonary effort is normal. No respiratory distress.   Abdominal:      General: There is no distension.      Tenderness: There is no abdominal tenderness. There is no right CVA tenderness, left CVA tenderness or rebound.   Genitourinary:     Penis: Normal.       Scrotum/Testes: Normal.      Prostate: Enlarged. Not tender and no nodules present.      Rectum: Normal.   Musculoskeletal: Normal range of motion.   Skin:     General: Skin is warm and dry.   Neurological:      General: No focal deficit present.      Mental Status: He is alert and oriented to person,  place, and time.   Psychiatric:         Mood and Affect: Mood normal.         Behavior: Behavior is cooperative.           LABS:      In office UA today was clear of infection and blood.        Lab Results   Component Value Date    PSA 3.5 03/03/2020    PSA 1.0 12/26/2007    PSADIAG 4.1 (H) 09/01/2020    PSATOTAL 4.4 (H) 09/01/2020     Past PSA:   2/21/19 PSA 3.83  2/20/18 PSA 2.65  2/15/17 PSA 2.26  2/5/16 PSA 2.04        IMPRESSION:    Encounter Diagnoses   Name Primary?    BPH with urinary obstruction Yes    Elevated PSA          Assessment:       1. BPH with urinary obstruction    2. Elevated PSA        Plan:         I spent 25 minutes with the patient of which more than half was spent in direct consultation with the patient in regards to our treatment and plan.    Education and recommendations of today's plan of care including home remedies.  Discussed his PSA results.  Discussed contributory factors for changes in PSA.  Discussed age related changes.  Recheck in 6 months.

## 2020-09-21 NOTE — PATIENT INSTRUCTIONS
Lab Results   Component Value Date    PSA 3.5 03/03/2020    PSA 1.0 12/26/2007    PSADIAG 4.1 (H) 09/01/2020    PSATOTAL 4.4 (H) 09/01/2020 2/21/19 PSA 3.83  2/20/18 PSA 2.65  2/15/17 PSA 2.26  2/5/16 PSA 2.04          What Is Prostate Cancer?    Cancer is when cells in the body change and grow out of control. Cancer cells can form lumps of tissue called tumors. Cancer that starts in the prostate is called prostate cancer. It can grow and spread beyond the prostate. Cancer that spreads is harder to treat.  Understanding the prostate  The prostate is a gland in men about the size and shape of a walnut. It surrounds the upper part of the urethra. This is the tube that carries urine from the bladder. The prostate makes some of the fluid thats part of semen. During orgasm, semen leaves the body through the urethra.  When prostate cancer forms  As a man ages, the cells of his prostate may change to form tumors or other growths. The types of growths include:  · Noncancerous growths. As a man ages, the prostate may grow larger. This is called benign prostatic hyperplasia (BPH). With BPH, extra prostate tissue often squeezes the urethra, causing symptoms such as trouble urinating. But BPH is not cancer and does not lead to cancer.  · Atypical cells. Sometimes prostate cells dont look like normal (typical) prostate cells. One type of abnormal growth is called prostatic intraepithelial neoplasia or PIN. Although PIN cells are not cancer cells, they may be a sign that cancer is likely to form.  · Cancer. When abnormal prostate cells grow out of control and start to invade other tissues, they are called cancer cells. These cells may or may not lead to symptoms. Some tumors can be felt during a physical exam, and some cant. Prostate cancer may grow into nearby organs or spread to nearby lymph nodes. Lymph nodes are small organs around the body that are part of the immune system. In some cases, the cancer spreads to  bones or organs in distant parts of the body. This is called metastasis.  Diagnosing prostate cancer  Prostate cancer may not cause symptoms at first. Urinary problems are often not a sign of cancer, but of another condition, such as BPH. To find out if you have prostate cancer, your healthcare provider must examine you and order tests. The tests help confirm a diagnosis of cancer. They also help give more information about a cancerous tumor. Tests might include:  · Prostate specific antigen (PSA) testing. PSA is a chemical made by prostate tissue. The amount of PSA in the blood (PSA level) is tested to check a mans risk for prostate cancer. In general, a high or rising PSA level may mean an increased cancer risk. A PSA test by itself cannot show if a man has prostate cancer. PSA testing is also used to check the success of cancer treatments.  · Core needle biopsy. This test is done to determine if a man has prostate cancer. A hollow needle is used to take small pieces of tissue from the prostate. This helps give more information about the cells. Before the test, pain medicine may be given to prevent pain. During the test, a small probe is inserted into the rectum. The probe sends an image of the prostate to a video monitor. With this image as a guide, the healthcare provider uses a thin, hollow needle to remove tiny tissue samples from the prostate. These are sent to a lab where they are looked at for cancer cells.  Date Last Reviewed: 5/1/2017 © 2000-2017 [employer]. [address] [place] [address]. All rights reserved. This information is not intended as a substitute for professional medical care. Always follow your healthcare professional's instructions.        Prostate Needle Biopsy    Prostate needle biopsy is a test to look for prostate cancer. During the test, a thin, hollow needle is used to take small samples of tissue from the prostate. The samples are then tested in a  lab.  Getting ready for the procedure  Prepare as you have been told. In addition to the following:  · Tell your healthcare provider about all medicines you take. This includes herbs and other supplements. It also includes any blood thinners, such as warfarin, clopidogrel, or daily aspirin. You may need to stop taking some or all of them before the procedure.  · You may be told to use a laxative, enemas, or both before the biopsy. This is to empty the colon and rectum of stool. Follow the instructions you are given.  · Your healthcare provider may prescribe antibiotics before the procedure. If so, take these as directed.  Risks and possible complications   All procedures have risks. The risks of this procedure include:  · Discomfort in the prostate area  · Infection in the urinary tract or prostate  · Infection in the bloodstream  · Rectal or urinary bleeding   The day of the procedure  The procedure is done in a healthcare providers office or a hospital. It takes about 45 minutes. You will be able to go home the same day. Transrectal ultrasound is often used during the procedure. This test uses sound waves to make images on a computer screen. The images help the healthcare provider insert the needle in the correct place. During the biopsy:  · If ultrasound will be used, you may be asked to drink water to fill your bladder.    · You may lie on your side on an exam table.   · The ultrasound transducer, which is about the size of a finger, is lubricated. It is then inserted into the rectum. This will feel like a prostate exam. The transducer is moved until the prostate can be seen in the ultrasound images.    · To numb the biopsy area, local anesthetics may be injected. You might also be given medicine to make you sleepy.  · Using the ultrasound images as a guide, the biopsy needle is inserted. It may be inserted through the rectum or through the skin between the scrotum and the anus (perineum).  · The needle is used  to take tissue samples from the prostate. About 12 samples are taken from different areas of the prostate. These samples are sent to a lab to be tested for cancer.  After the biopsy  At first you may feel a little lightheaded, especially if you had medicine to make you sleepy. You can lie on the table until you feel able to stand. You can go home once you are feeling better. You can go back to your normal activities. You may have some blood in your urine or stool that day. This is normal. You may also notice blood in your semen for weeks after the biopsy. This is normal and not dangerous. Your healthcare provider can tell you more about what to expect.  Follow-up care  You will see your healthcare provider for a follow-up visit. Depending on the biopsy results, you may be scheduled for more tests. If signs of cancer are found, you and your healthcare provider can discuss options for further testing.  When to call your healthcare provider  Call your healthcare provider if you have any of the following:  · Blood clots in your urine  · Bloody diarrhea  · Blood in the urine or stool that doesnt go away after 48 hours  · Chest pain or trouble breathing (call 911)  · Chills  · Feeling of weakness  · Fever of 100.4°F (38°C) or higher, or as directed by your healthcare provider  · Inability to urinate   Date Last Reviewed: 5/1/2017  © 4939-5255 [employer]. [address] [place] [address]. All rights reserved. This information is not intended as a substitute for professional medical care. Always follow your healthcare professional's instructions.        Prostate Biopsy    Cancer occurs when abnormal cells form a tumor. A tumor is a lump of cells that grow uncontrolled. Initial tests that may indicate cancer of the prostate include a digital rectal exam, a PSA (prostate specific antigen blood test), ultrasound, and others. A core needle biopsy will be done if your healthcare provider thinks you have  prostate cancer. A thin needle is used to remove small samples of prostate tissue. Usually multiple biopsies are taken. These samples are checked for cancer.  Taking tissue samples  A biopsy takes about 15 to 20 minutes. You may be given an enema or suppository before the biopsy to clear the bowels. Antibiotics are given at least an hour before the biopsy. During the procedure:  · You will be given antibiotics to prevent infection.  · You may be given a sedative, local pain killer, or pain medicine.  · A small, ultrasound  probe is put into the rectum as you lie on your side. A picture of your prostate can then be seen on a monitor. This is called a transrectal ultrasound (TRUS).  · Your healthcare provider will use the TRUS picture as a guide. He or she will use a thin needle to remove tiny tissue samples from some sites in the prostate.  · These tissue samples are sent to the pathology department. They are looked at under a microscope so a diagnosis can be made.   Risks and complications of core needle biopsy  · Infection  · Blood in urine, stool, or semen  · Pain  · The biopsy misses the tumor   Home care  You may have had the biopsy done through your rectum, your urethra, or through the skin between your scrotum and rectum. Your healthcare provider will tell you what to do after the biopsy. These instructions are based on your health condition, the type of biopsy, and your providers practices.  · Your provider may give you pain medicine such as acetaminophen or ibuprofen for discomfort or pain. Follow your providers instructions for taking these medicines. Dont take aspirin or ibuprofen after the procedure. If you have a chronic liver or kidney disease, talk with your provider before taking these medicines. Also talk with your provider if youve had a stomach ulcer or gastrointestinal bleeding. Let your provider know all the medicines you currently take.  · You may need to take antibiotics for 1 to 2 days.  This will help prevent an infection. Signs of an infection include chills, pain, or fever.  · You may be told to drink 8 ounces of water every 30 minutes for 2 hours. This will help ease any discomfort. You can also take a warm bath or put a warm, damp washcloth over your urethra to help ease the pain.  · You may see minor bleeding after the procedure. This is normal and usually needs no treatment. You may see blood in your urine or semen (rust color). You may also have light bleeding from your rectum if you have hemorrhoids.  · Your provider will tell you when you can go back to your normal activities. This includes sex, exercise, and straining physically. Discuss these with your provider.  When to seek medical advice  Call your healthcare provider right away if any of these occur:  · You arent able to urinate, or see that you have less flow of urine  · Chills and fever of 100.4°F (38°C)    · Severe pain  · Signs of an infection that is getting worse. These include worsening pain, pain in your side under the rib cage or in the low back, or foul-smelling urine.  · Blood clots or bright red blood in your stool or urine  · You feel confused or very tired  Date Last Reviewed: 1/1/2017  © 5414-5070 [employer]. [address] [place] [address], [place] [address]. All rights reserved. This information is not intended as a substitute for professional medical care. Always follow your healthcare professional's instructions.

## 2020-09-25 ENCOUNTER — CLINICAL SUPPORT (OUTPATIENT)
Dept: FAMILY MEDICINE | Facility: CLINIC | Age: 75
End: 2020-09-25
Payer: MEDICARE

## 2020-09-25 DIAGNOSIS — Z23 INFLUENZA VACCINE NEEDED: Primary | ICD-10-CM

## 2020-09-25 PROCEDURE — G0008 ADMIN INFLUENZA VIRUS VAC: HCPCS | Mod: HCNC,S$GLB,ICN, | Performed by: INTERNAL MEDICINE

## 2020-09-25 PROCEDURE — G0008 PR ADMIN INFLUENZA VIRUS VAC: ICD-10-PCS | Mod: HCNC,S$GLB,ICN, | Performed by: INTERNAL MEDICINE

## 2020-09-25 PROCEDURE — 90694 VACC AIIV4 NO PRSRV 0.5ML IM: CPT | Mod: HCNC,S$GLB,ICN, | Performed by: INTERNAL MEDICINE

## 2020-09-25 PROCEDURE — 90694 FLU VACCINE - QUADRIVALENT - ADJUVANTED: ICD-10-PCS | Mod: HCNC,S$GLB,ICN, | Performed by: INTERNAL MEDICINE

## 2020-09-29 ENCOUNTER — PATIENT MESSAGE (OUTPATIENT)
Dept: OTHER | Facility: OTHER | Age: 75
End: 2020-09-29

## 2020-11-17 ENCOUNTER — TELEPHONE (OUTPATIENT)
Dept: FAMILY MEDICINE | Facility: CLINIC | Age: 75
End: 2020-11-17

## 2020-11-17 ENCOUNTER — PATIENT MESSAGE (OUTPATIENT)
Dept: FAMILY MEDICINE | Facility: CLINIC | Age: 75
End: 2020-11-17

## 2020-11-20 ENCOUNTER — PATIENT MESSAGE (OUTPATIENT)
Dept: FAMILY MEDICINE | Facility: CLINIC | Age: 75
End: 2020-11-20

## 2020-11-26 ENCOUNTER — PATIENT MESSAGE (OUTPATIENT)
Dept: FAMILY MEDICINE | Facility: CLINIC | Age: 75
End: 2020-11-26

## 2020-12-10 RX ORDER — HYDROCHLOROTHIAZIDE 12.5 MG/1
CAPSULE ORAL
Qty: 90 CAPSULE | Refills: 3 | Status: SHIPPED | OUTPATIENT
Start: 2020-12-10 | End: 2021-10-04

## 2020-12-11 ENCOUNTER — PATIENT MESSAGE (OUTPATIENT)
Dept: OTHER | Facility: OTHER | Age: 75
End: 2020-12-11

## 2020-12-31 ENCOUNTER — PATIENT MESSAGE (OUTPATIENT)
Dept: FAMILY MEDICINE | Facility: CLINIC | Age: 75
End: 2020-12-31

## 2020-12-31 DIAGNOSIS — Z20.822 CLOSE EXPOSURE TO COVID-19 VIRUS: Primary | ICD-10-CM

## 2021-01-05 ENCOUNTER — LAB VISIT (OUTPATIENT)
Dept: INTERNAL MEDICINE | Facility: CLINIC | Age: 76
End: 2021-01-05
Payer: MEDICARE

## 2021-01-05 DIAGNOSIS — Z20.822 CLOSE EXPOSURE TO COVID-19 VIRUS: ICD-10-CM

## 2021-01-05 PROCEDURE — U0003 INFECTIOUS AGENT DETECTION BY NUCLEIC ACID (DNA OR RNA); SEVERE ACUTE RESPIRATORY SYNDROME CORONAVIRUS 2 (SARS-COV-2) (CORONAVIRUS DISEASE [COVID-19]), AMPLIFIED PROBE TECHNIQUE, MAKING USE OF HIGH THROUGHPUT TECHNOLOGIES AS DESCRIBED BY CMS-2020-01-R: HCPCS | Mod: HCNC

## 2021-01-06 ENCOUNTER — PATIENT MESSAGE (OUTPATIENT)
Dept: ADMINISTRATIVE | Facility: OTHER | Age: 76
End: 2021-01-06

## 2021-01-07 ENCOUNTER — TELEPHONE (OUTPATIENT)
Dept: FAMILY MEDICINE | Facility: CLINIC | Age: 76
End: 2021-01-07

## 2021-01-07 LAB — SARS-COV-2 RNA RESP QL NAA+PROBE: NOT DETECTED

## 2021-01-13 ENCOUNTER — IMMUNIZATION (OUTPATIENT)
Dept: PHARMACY | Facility: CLINIC | Age: 76
End: 2021-01-13
Payer: MEDICARE

## 2021-01-13 DIAGNOSIS — Z23 NEED FOR VACCINATION: ICD-10-CM

## 2021-02-03 ENCOUNTER — PATIENT MESSAGE (OUTPATIENT)
Dept: FAMILY MEDICINE | Facility: CLINIC | Age: 76
End: 2021-02-03

## 2021-02-04 ENCOUNTER — PATIENT MESSAGE (OUTPATIENT)
Dept: FAMILY MEDICINE | Facility: CLINIC | Age: 76
End: 2021-02-04

## 2021-02-10 ENCOUNTER — IMMUNIZATION (OUTPATIENT)
Dept: PHARMACY | Facility: CLINIC | Age: 76
End: 2021-02-10
Payer: MEDICARE

## 2021-02-10 DIAGNOSIS — Z23 NEED FOR VACCINATION: Primary | ICD-10-CM

## 2021-03-01 ENCOUNTER — PATIENT OUTREACH (OUTPATIENT)
Dept: ADMINISTRATIVE | Facility: OTHER | Age: 76
End: 2021-03-01

## 2021-03-03 ENCOUNTER — OFFICE VISIT (OUTPATIENT)
Dept: OPTOMETRY | Facility: CLINIC | Age: 76
End: 2021-03-03
Payer: COMMERCIAL

## 2021-03-03 DIAGNOSIS — H25.13 NUCLEAR SCLEROSIS OF BOTH EYES: Primary | ICD-10-CM

## 2021-03-03 DIAGNOSIS — H52.223 REGULAR ASTIGMATISM OF BOTH EYES: ICD-10-CM

## 2021-03-03 DIAGNOSIS — H52.4 PRESBYOPIA OF BOTH EYES: ICD-10-CM

## 2021-03-03 DIAGNOSIS — I10 HYPERTENSION, UNSPECIFIED TYPE: ICD-10-CM

## 2021-03-03 DIAGNOSIS — Z13.5 SCREENING FOR EYE CONDITION: ICD-10-CM

## 2021-03-03 DIAGNOSIS — H26.9 CORTICAL CATARACT OF BOTH EYES: ICD-10-CM

## 2021-03-03 PROCEDURE — 92015 DETERMINE REFRACTIVE STATE: CPT | Mod: S$GLB,,, | Performed by: OPTOMETRIST

## 2021-03-03 PROCEDURE — 92014 PR EYE EXAM, EST PATIENT,COMPREHESV: ICD-10-PCS | Mod: S$GLB,,, | Performed by: OPTOMETRIST

## 2021-03-03 PROCEDURE — 92015 PR REFRACTION: ICD-10-PCS | Mod: S$GLB,,, | Performed by: OPTOMETRIST

## 2021-03-03 PROCEDURE — 99999 PR PBB SHADOW E&M-EST. PATIENT-LVL II: ICD-10-PCS | Mod: PBBFAC,,, | Performed by: OPTOMETRIST

## 2021-03-03 PROCEDURE — 99999 PR PBB SHADOW E&M-EST. PATIENT-LVL II: CPT | Mod: PBBFAC,,, | Performed by: OPTOMETRIST

## 2021-03-03 PROCEDURE — 92014 COMPRE OPH EXAM EST PT 1/>: CPT | Mod: S$GLB,,, | Performed by: OPTOMETRIST

## 2021-03-08 ENCOUNTER — OFFICE VISIT (OUTPATIENT)
Dept: FAMILY MEDICINE | Facility: CLINIC | Age: 76
End: 2021-03-08
Payer: MEDICARE

## 2021-03-08 VITALS
OXYGEN SATURATION: 96 % | SYSTOLIC BLOOD PRESSURE: 120 MMHG | RESPIRATION RATE: 18 BRPM | BODY MASS INDEX: 35.63 KG/M2 | TEMPERATURE: 98 F | WEIGHT: 254.5 LBS | HEART RATE: 63 BPM | DIASTOLIC BLOOD PRESSURE: 78 MMHG | HEIGHT: 71 IN

## 2021-03-08 DIAGNOSIS — J30.1 SEASONAL ALLERGIC RHINITIS DUE TO POLLEN: Primary | ICD-10-CM

## 2021-03-08 DIAGNOSIS — E78.2 MIXED HYPERLIPIDEMIA: ICD-10-CM

## 2021-03-08 DIAGNOSIS — I10 BENIGN ESSENTIAL HYPERTENSION: ICD-10-CM

## 2021-03-08 DIAGNOSIS — M51.36 DEGENERATIVE LUMBAR DISC: ICD-10-CM

## 2021-03-08 PROBLEM — M51.369 DEGENERATIVE LUMBAR DISC: Status: ACTIVE | Noted: 2021-03-08

## 2021-03-08 PROCEDURE — 99499 UNLISTED E&M SERVICE: CPT | Mod: S$GLB,,, | Performed by: INTERNAL MEDICINE

## 2021-03-08 PROCEDURE — 99999 PR PBB SHADOW E&M-EST. PATIENT-LVL IV: ICD-10-PCS | Mod: PBBFAC,,, | Performed by: INTERNAL MEDICINE

## 2021-03-08 PROCEDURE — 1101F PT FALLS ASSESS-DOCD LE1/YR: CPT | Mod: CPTII,S$GLB,, | Performed by: INTERNAL MEDICINE

## 2021-03-08 PROCEDURE — 1159F MED LIST DOCD IN RCRD: CPT | Mod: S$GLB,,, | Performed by: INTERNAL MEDICINE

## 2021-03-08 PROCEDURE — 3078F PR MOST RECENT DIASTOLIC BLOOD PRESSURE < 80 MM HG: ICD-10-PCS | Mod: CPTII,S$GLB,, | Performed by: INTERNAL MEDICINE

## 2021-03-08 PROCEDURE — 3288F PR FALLS RISK ASSESSMENT DOCUMENTED: ICD-10-PCS | Mod: CPTII,S$GLB,, | Performed by: INTERNAL MEDICINE

## 2021-03-08 PROCEDURE — 3074F SYST BP LT 130 MM HG: CPT | Mod: CPTII,S$GLB,, | Performed by: INTERNAL MEDICINE

## 2021-03-08 PROCEDURE — 99999 PR PBB SHADOW E&M-EST. PATIENT-LVL IV: CPT | Mod: PBBFAC,,, | Performed by: INTERNAL MEDICINE

## 2021-03-08 PROCEDURE — 1126F AMNT PAIN NOTED NONE PRSNT: CPT | Mod: S$GLB,,, | Performed by: INTERNAL MEDICINE

## 2021-03-08 PROCEDURE — 3074F PR MOST RECENT SYSTOLIC BLOOD PRESSURE < 130 MM HG: ICD-10-PCS | Mod: CPTII,S$GLB,, | Performed by: INTERNAL MEDICINE

## 2021-03-08 PROCEDURE — 1126F PR PAIN SEVERITY QUANTIFIED, NO PAIN PRESENT: ICD-10-PCS | Mod: S$GLB,,, | Performed by: INTERNAL MEDICINE

## 2021-03-08 PROCEDURE — 99499 RISK ADDL DX/OHS AUDIT: ICD-10-PCS | Mod: S$GLB,,, | Performed by: INTERNAL MEDICINE

## 2021-03-08 PROCEDURE — 1101F PR PT FALLS ASSESS DOC 0-1 FALLS W/OUT INJ PAST YR: ICD-10-PCS | Mod: CPTII,S$GLB,, | Performed by: INTERNAL MEDICINE

## 2021-03-08 PROCEDURE — 99214 PR OFFICE/OUTPT VISIT, EST, LEVL IV, 30-39 MIN: ICD-10-PCS | Mod: S$GLB,,, | Performed by: INTERNAL MEDICINE

## 2021-03-08 PROCEDURE — 3078F DIAST BP <80 MM HG: CPT | Mod: CPTII,S$GLB,, | Performed by: INTERNAL MEDICINE

## 2021-03-08 PROCEDURE — 1159F PR MEDICATION LIST DOCUMENTED IN MEDICAL RECORD: ICD-10-PCS | Mod: S$GLB,,, | Performed by: INTERNAL MEDICINE

## 2021-03-08 PROCEDURE — 99214 OFFICE O/P EST MOD 30 MIN: CPT | Mod: S$GLB,,, | Performed by: INTERNAL MEDICINE

## 2021-03-08 PROCEDURE — 3288F FALL RISK ASSESSMENT DOCD: CPT | Mod: CPTII,S$GLB,, | Performed by: INTERNAL MEDICINE

## 2021-03-09 ENCOUNTER — PATIENT MESSAGE (OUTPATIENT)
Dept: UROLOGY | Facility: CLINIC | Age: 76
End: 2021-03-09

## 2021-03-11 ENCOUNTER — OFFICE VISIT (OUTPATIENT)
Dept: UROLOGY | Facility: CLINIC | Age: 76
End: 2021-03-11
Payer: MEDICARE

## 2021-03-11 VITALS — HEART RATE: 71 BPM | DIASTOLIC BLOOD PRESSURE: 82 MMHG | SYSTOLIC BLOOD PRESSURE: 135 MMHG

## 2021-03-11 DIAGNOSIS — N13.8 BPH WITH URINARY OBSTRUCTION: Primary | ICD-10-CM

## 2021-03-11 DIAGNOSIS — R97.20 ELEVATED PSA: ICD-10-CM

## 2021-03-11 DIAGNOSIS — N40.1 BPH WITH URINARY OBSTRUCTION: Primary | ICD-10-CM

## 2021-03-11 LAB
BILIRUB SERPL-MCNC: NORMAL MG/DL
BLOOD URINE, POC: NORMAL
CLARITY, POC UA: CLEAR
COLOR, POC UA: NORMAL
GLUCOSE UR QL STRIP: NORMAL
KETONES UR QL STRIP: NORMAL
LEUKOCYTE ESTERASE URINE, POC: NORMAL
NITRITE, POC UA: NORMAL
PH, POC UA: 5
PROTEIN, POC: NORMAL
SPECIFIC GRAVITY, POC UA: 1.02
UROBILINOGEN, POC UA: NORMAL

## 2021-03-11 PROCEDURE — 3075F SYST BP GE 130 - 139MM HG: CPT | Mod: CPTII,S$GLB,, | Performed by: NURSE PRACTITIONER

## 2021-03-11 PROCEDURE — 1125F PR PAIN SEVERITY QUANTIFIED, PAIN PRESENT: ICD-10-PCS | Mod: S$GLB,,, | Performed by: NURSE PRACTITIONER

## 2021-03-11 PROCEDURE — 3288F FALL RISK ASSESSMENT DOCD: CPT | Mod: CPTII,S$GLB,, | Performed by: NURSE PRACTITIONER

## 2021-03-11 PROCEDURE — 3079F DIAST BP 80-89 MM HG: CPT | Mod: CPTII,S$GLB,, | Performed by: NURSE PRACTITIONER

## 2021-03-11 PROCEDURE — 1159F MED LIST DOCD IN RCRD: CPT | Mod: S$GLB,,, | Performed by: NURSE PRACTITIONER

## 2021-03-11 PROCEDURE — 3075F PR MOST RECENT SYSTOLIC BLOOD PRESS GE 130-139MM HG: ICD-10-PCS | Mod: CPTII,S$GLB,, | Performed by: NURSE PRACTITIONER

## 2021-03-11 PROCEDURE — 1125F AMNT PAIN NOTED PAIN PRSNT: CPT | Mod: S$GLB,,, | Performed by: NURSE PRACTITIONER

## 2021-03-11 PROCEDURE — 81002 POCT URINE DIPSTICK WITHOUT MICROSCOPE: ICD-10-PCS | Mod: S$GLB,,, | Performed by: NURSE PRACTITIONER

## 2021-03-11 PROCEDURE — 99214 OFFICE O/P EST MOD 30 MIN: CPT | Mod: 25,S$GLB,, | Performed by: NURSE PRACTITIONER

## 2021-03-11 PROCEDURE — 99214 PR OFFICE/OUTPT VISIT, EST, LEVL IV, 30-39 MIN: ICD-10-PCS | Mod: 25,S$GLB,, | Performed by: NURSE PRACTITIONER

## 2021-03-11 PROCEDURE — 3288F PR FALLS RISK ASSESSMENT DOCUMENTED: ICD-10-PCS | Mod: CPTII,S$GLB,, | Performed by: NURSE PRACTITIONER

## 2021-03-11 PROCEDURE — 99999 PR PBB SHADOW E&M-EST. PATIENT-LVL III: CPT | Mod: PBBFAC,,, | Performed by: NURSE PRACTITIONER

## 2021-03-11 PROCEDURE — 99999 PR PBB SHADOW E&M-EST. PATIENT-LVL III: ICD-10-PCS | Mod: PBBFAC,,, | Performed by: NURSE PRACTITIONER

## 2021-03-11 PROCEDURE — 1159F PR MEDICATION LIST DOCUMENTED IN MEDICAL RECORD: ICD-10-PCS | Mod: S$GLB,,, | Performed by: NURSE PRACTITIONER

## 2021-03-11 PROCEDURE — 3079F PR MOST RECENT DIASTOLIC BLOOD PRESSURE 80-89 MM HG: ICD-10-PCS | Mod: CPTII,S$GLB,, | Performed by: NURSE PRACTITIONER

## 2021-03-11 PROCEDURE — 1101F PT FALLS ASSESS-DOCD LE1/YR: CPT | Mod: CPTII,S$GLB,, | Performed by: NURSE PRACTITIONER

## 2021-03-11 PROCEDURE — 81002 URINALYSIS NONAUTO W/O SCOPE: CPT | Mod: S$GLB,,, | Performed by: NURSE PRACTITIONER

## 2021-03-11 PROCEDURE — 1101F PR PT FALLS ASSESS DOC 0-1 FALLS W/OUT INJ PAST YR: ICD-10-PCS | Mod: CPTII,S$GLB,, | Performed by: NURSE PRACTITIONER

## 2021-03-18 ENCOUNTER — PATIENT MESSAGE (OUTPATIENT)
Dept: RESEARCH | Facility: HOSPITAL | Age: 76
End: 2021-03-18

## 2021-03-26 ENCOUNTER — PATIENT MESSAGE (OUTPATIENT)
Dept: RESEARCH | Facility: HOSPITAL | Age: 76
End: 2021-03-26

## 2021-03-30 ENCOUNTER — PES CALL (OUTPATIENT)
Dept: ADMINISTRATIVE | Facility: CLINIC | Age: 76
End: 2021-03-30

## 2021-05-10 ENCOUNTER — PATIENT MESSAGE (OUTPATIENT)
Dept: FAMILY MEDICINE | Facility: CLINIC | Age: 76
End: 2021-05-10

## 2021-05-10 RX ORDER — LOSARTAN POTASSIUM 50 MG/1
TABLET ORAL
Qty: 90 TABLET | Refills: 3 | Status: SHIPPED | OUTPATIENT
Start: 2021-05-10 | End: 2022-03-02 | Stop reason: SDUPTHER

## 2021-09-10 ENCOUNTER — PATIENT MESSAGE (OUTPATIENT)
Dept: UROLOGY | Facility: CLINIC | Age: 76
End: 2021-09-10

## 2021-09-10 ENCOUNTER — OFFICE VISIT (OUTPATIENT)
Dept: UROLOGY | Facility: CLINIC | Age: 76
End: 2021-09-10
Payer: MEDICARE

## 2021-09-10 ENCOUNTER — LAB VISIT (OUTPATIENT)
Dept: LAB | Facility: HOSPITAL | Age: 76
End: 2021-09-10
Payer: MEDICARE

## 2021-09-10 VITALS
HEART RATE: 70 BPM | DIASTOLIC BLOOD PRESSURE: 71 MMHG | WEIGHT: 255.75 LBS | SYSTOLIC BLOOD PRESSURE: 117 MMHG | HEIGHT: 71 IN | BODY MASS INDEX: 35.81 KG/M2

## 2021-09-10 DIAGNOSIS — N13.8 BPH WITH URINARY OBSTRUCTION: ICD-10-CM

## 2021-09-10 DIAGNOSIS — N40.1 BPH WITH URINARY OBSTRUCTION: ICD-10-CM

## 2021-09-10 DIAGNOSIS — R97.20 ELEVATED PSA: Primary | ICD-10-CM

## 2021-09-10 DIAGNOSIS — R97.20 ELEVATED PSA: ICD-10-CM

## 2021-09-10 LAB
BILIRUB SERPL-MCNC: NORMAL MG/DL
BLOOD URINE, POC: NORMAL
CLARITY, POC UA: CLEAR
COLOR, POC UA: YELLOW
COMPLEXED PSA SERPL-MCNC: 4.8 NG/ML (ref 0–4)
GLUCOSE UR QL STRIP: NORMAL
KETONES UR QL STRIP: NORMAL
LEUKOCYTE ESTERASE URINE, POC: NORMAL
NITRITE, POC UA: NORMAL
PH, POC UA: 5
POC RESIDUAL URINE VOLUME: 31 ML (ref 0–100)
PROTEIN, POC: NORMAL
SPECIFIC GRAVITY, POC UA: 1.02
UROBILINOGEN, POC UA: NORMAL

## 2021-09-10 PROCEDURE — 81002 URINALYSIS NONAUTO W/O SCOPE: CPT | Mod: S$GLB,,, | Performed by: NURSE PRACTITIONER

## 2021-09-10 PROCEDURE — 51798 US URINE CAPACITY MEASURE: CPT | Mod: S$GLB,,, | Performed by: NURSE PRACTITIONER

## 2021-09-10 PROCEDURE — 3078F PR MOST RECENT DIASTOLIC BLOOD PRESSURE < 80 MM HG: ICD-10-PCS | Mod: CPTII,S$GLB,, | Performed by: NURSE PRACTITIONER

## 2021-09-10 PROCEDURE — 99214 OFFICE O/P EST MOD 30 MIN: CPT | Mod: S$GLB,,, | Performed by: NURSE PRACTITIONER

## 2021-09-10 PROCEDURE — 1126F PR PAIN SEVERITY QUANTIFIED, NO PAIN PRESENT: ICD-10-PCS | Mod: CPTII,S$GLB,, | Performed by: NURSE PRACTITIONER

## 2021-09-10 PROCEDURE — 1160F RVW MEDS BY RX/DR IN RCRD: CPT | Mod: CPTII,S$GLB,, | Performed by: NURSE PRACTITIONER

## 2021-09-10 PROCEDURE — 99214 PR OFFICE/OUTPT VISIT, EST, LEVL IV, 30-39 MIN: ICD-10-PCS | Mod: S$GLB,,, | Performed by: NURSE PRACTITIONER

## 2021-09-10 PROCEDURE — 3078F DIAST BP <80 MM HG: CPT | Mod: CPTII,S$GLB,, | Performed by: NURSE PRACTITIONER

## 2021-09-10 PROCEDURE — 1160F PR REVIEW ALL MEDS BY PRESCRIBER/CLIN PHARMACIST DOCUMENTED: ICD-10-PCS | Mod: CPTII,S$GLB,, | Performed by: NURSE PRACTITIONER

## 2021-09-10 PROCEDURE — 84153 ASSAY OF PSA TOTAL: CPT | Performed by: NURSE PRACTITIONER

## 2021-09-10 PROCEDURE — 3074F PR MOST RECENT SYSTOLIC BLOOD PRESSURE < 130 MM HG: ICD-10-PCS | Mod: CPTII,S$GLB,, | Performed by: NURSE PRACTITIONER

## 2021-09-10 PROCEDURE — 1159F PR MEDICATION LIST DOCUMENTED IN MEDICAL RECORD: ICD-10-PCS | Mod: CPTII,S$GLB,, | Performed by: NURSE PRACTITIONER

## 2021-09-10 PROCEDURE — 99999 PR PBB SHADOW E&M-EST. PATIENT-LVL III: CPT | Mod: PBBFAC,,, | Performed by: NURSE PRACTITIONER

## 2021-09-10 PROCEDURE — 3288F FALL RISK ASSESSMENT DOCD: CPT | Mod: CPTII,S$GLB,, | Performed by: NURSE PRACTITIONER

## 2021-09-10 PROCEDURE — 81002 POCT URINE DIPSTICK WITHOUT MICROSCOPE: ICD-10-PCS | Mod: S$GLB,,, | Performed by: NURSE PRACTITIONER

## 2021-09-10 PROCEDURE — 3288F PR FALLS RISK ASSESSMENT DOCUMENTED: ICD-10-PCS | Mod: CPTII,S$GLB,, | Performed by: NURSE PRACTITIONER

## 2021-09-10 PROCEDURE — 1159F MED LIST DOCD IN RCRD: CPT | Mod: CPTII,S$GLB,, | Performed by: NURSE PRACTITIONER

## 2021-09-10 PROCEDURE — 51798 POCT BLADDER SCAN: ICD-10-PCS | Mod: S$GLB,,, | Performed by: NURSE PRACTITIONER

## 2021-09-10 PROCEDURE — 1101F PT FALLS ASSESS-DOCD LE1/YR: CPT | Mod: CPTII,S$GLB,, | Performed by: NURSE PRACTITIONER

## 2021-09-10 PROCEDURE — 1126F AMNT PAIN NOTED NONE PRSNT: CPT | Mod: CPTII,S$GLB,, | Performed by: NURSE PRACTITIONER

## 2021-09-10 PROCEDURE — 99999 PR PBB SHADOW E&M-EST. PATIENT-LVL III: ICD-10-PCS | Mod: PBBFAC,,, | Performed by: NURSE PRACTITIONER

## 2021-09-10 PROCEDURE — 3074F SYST BP LT 130 MM HG: CPT | Mod: CPTII,S$GLB,, | Performed by: NURSE PRACTITIONER

## 2021-09-10 PROCEDURE — 1101F PR PT FALLS ASSESS DOC 0-1 FALLS W/OUT INJ PAST YR: ICD-10-PCS | Mod: CPTII,S$GLB,, | Performed by: NURSE PRACTITIONER

## 2021-09-10 PROCEDURE — 36415 COLL VENOUS BLD VENIPUNCTURE: CPT | Performed by: NURSE PRACTITIONER

## 2021-10-01 ENCOUNTER — OFFICE VISIT (OUTPATIENT)
Dept: FAMILY MEDICINE | Facility: CLINIC | Age: 76
End: 2021-10-01
Payer: MEDICARE

## 2021-10-01 VITALS
HEIGHT: 71 IN | WEIGHT: 257.19 LBS | TEMPERATURE: 98 F | HEART RATE: 67 BPM | DIASTOLIC BLOOD PRESSURE: 72 MMHG | OXYGEN SATURATION: 96 % | RESPIRATION RATE: 18 BRPM | SYSTOLIC BLOOD PRESSURE: 120 MMHG | BODY MASS INDEX: 36.01 KG/M2

## 2021-10-01 DIAGNOSIS — E66.9 CLASS 2 OBESITY WITH BODY MASS INDEX (BMI) OF 35.0 TO 35.9 IN ADULT, UNSPECIFIED OBESITY TYPE, UNSPECIFIED WHETHER SERIOUS COMORBIDITY PRESENT: ICD-10-CM

## 2021-10-01 DIAGNOSIS — Z00.00 ANNUAL PHYSICAL EXAM: Primary | ICD-10-CM

## 2021-10-01 DIAGNOSIS — E78.2 MIXED HYPERLIPIDEMIA: ICD-10-CM

## 2021-10-01 DIAGNOSIS — I10 BENIGN ESSENTIAL HYPERTENSION: ICD-10-CM

## 2021-10-01 PROCEDURE — 1159F PR MEDICATION LIST DOCUMENTED IN MEDICAL RECORD: ICD-10-PCS | Mod: HCNC,CPTII,S$GLB, | Performed by: INTERNAL MEDICINE

## 2021-10-01 PROCEDURE — 3078F PR MOST RECENT DIASTOLIC BLOOD PRESSURE < 80 MM HG: ICD-10-PCS | Mod: HCNC,CPTII,S$GLB, | Performed by: INTERNAL MEDICINE

## 2021-10-01 PROCEDURE — 3074F PR MOST RECENT SYSTOLIC BLOOD PRESSURE < 130 MM HG: ICD-10-PCS | Mod: HCNC,CPTII,S$GLB, | Performed by: INTERNAL MEDICINE

## 2021-10-01 PROCEDURE — 1126F PR PAIN SEVERITY QUANTIFIED, NO PAIN PRESENT: ICD-10-PCS | Mod: HCNC,CPTII,S$GLB, | Performed by: INTERNAL MEDICINE

## 2021-10-01 PROCEDURE — 3074F SYST BP LT 130 MM HG: CPT | Mod: HCNC,CPTII,S$GLB, | Performed by: INTERNAL MEDICINE

## 2021-10-01 PROCEDURE — 99499 UNLISTED E&M SERVICE: CPT | Mod: S$GLB,,, | Performed by: INTERNAL MEDICINE

## 2021-10-01 PROCEDURE — 1101F PR PT FALLS ASSESS DOC 0-1 FALLS W/OUT INJ PAST YR: ICD-10-PCS | Mod: HCNC,CPTII,S$GLB, | Performed by: INTERNAL MEDICINE

## 2021-10-01 PROCEDURE — 1160F PR REVIEW ALL MEDS BY PRESCRIBER/CLIN PHARMACIST DOCUMENTED: ICD-10-PCS | Mod: HCNC,CPTII,S$GLB, | Performed by: INTERNAL MEDICINE

## 2021-10-01 PROCEDURE — 99397 PR PREVENTIVE VISIT,EST,65 & OVER: ICD-10-PCS | Mod: HCNC,S$GLB,, | Performed by: INTERNAL MEDICINE

## 2021-10-01 PROCEDURE — 1159F MED LIST DOCD IN RCRD: CPT | Mod: HCNC,CPTII,S$GLB, | Performed by: INTERNAL MEDICINE

## 2021-10-01 PROCEDURE — 1126F AMNT PAIN NOTED NONE PRSNT: CPT | Mod: HCNC,CPTII,S$GLB, | Performed by: INTERNAL MEDICINE

## 2021-10-01 PROCEDURE — 1160F RVW MEDS BY RX/DR IN RCRD: CPT | Mod: HCNC,CPTII,S$GLB, | Performed by: INTERNAL MEDICINE

## 2021-10-01 PROCEDURE — 99499 RISK ADDL DX/OHS AUDIT: ICD-10-PCS | Mod: S$GLB,,, | Performed by: INTERNAL MEDICINE

## 2021-10-01 PROCEDURE — 99397 PER PM REEVAL EST PAT 65+ YR: CPT | Mod: HCNC,S$GLB,, | Performed by: INTERNAL MEDICINE

## 2021-10-01 PROCEDURE — 99999 PR PBB SHADOW E&M-EST. PATIENT-LVL III: ICD-10-PCS | Mod: PBBFAC,HCNC,, | Performed by: INTERNAL MEDICINE

## 2021-10-01 PROCEDURE — 99999 PR PBB SHADOW E&M-EST. PATIENT-LVL III: CPT | Mod: PBBFAC,HCNC,, | Performed by: INTERNAL MEDICINE

## 2021-10-01 PROCEDURE — 3288F PR FALLS RISK ASSESSMENT DOCUMENTED: ICD-10-PCS | Mod: HCNC,CPTII,S$GLB, | Performed by: INTERNAL MEDICINE

## 2021-10-01 PROCEDURE — 1101F PT FALLS ASSESS-DOCD LE1/YR: CPT | Mod: HCNC,CPTII,S$GLB, | Performed by: INTERNAL MEDICINE

## 2021-10-01 PROCEDURE — 3288F FALL RISK ASSESSMENT DOCD: CPT | Mod: HCNC,CPTII,S$GLB, | Performed by: INTERNAL MEDICINE

## 2021-10-01 PROCEDURE — 3078F DIAST BP <80 MM HG: CPT | Mod: HCNC,CPTII,S$GLB, | Performed by: INTERNAL MEDICINE

## 2021-10-04 ENCOUNTER — LAB VISIT (OUTPATIENT)
Dept: LAB | Facility: HOSPITAL | Age: 76
End: 2021-10-04
Attending: INTERNAL MEDICINE
Payer: MEDICARE

## 2021-10-04 DIAGNOSIS — E78.2 MIXED HYPERLIPIDEMIA: ICD-10-CM

## 2021-10-04 DIAGNOSIS — I10 BENIGN ESSENTIAL HYPERTENSION: ICD-10-CM

## 2021-10-04 LAB
ALBUMIN SERPL BCP-MCNC: 3.9 G/DL (ref 3.5–5.2)
ALP SERPL-CCNC: 77 U/L (ref 55–135)
ALT SERPL W/O P-5'-P-CCNC: 31 U/L (ref 10–44)
ANION GAP SERPL CALC-SCNC: 12 MMOL/L (ref 8–16)
AST SERPL-CCNC: 22 U/L (ref 10–40)
BASOPHILS # BLD AUTO: 0.05 K/UL (ref 0–0.2)
BASOPHILS NFR BLD: 0.7 % (ref 0–1.9)
BILIRUB SERPL-MCNC: 0.6 MG/DL (ref 0.1–1)
BUN SERPL-MCNC: 21 MG/DL (ref 8–23)
CALCIUM SERPL-MCNC: 9.2 MG/DL (ref 8.7–10.5)
CHLORIDE SERPL-SCNC: 105 MMOL/L (ref 95–110)
CHOLEST SERPL-MCNC: 155 MG/DL (ref 120–199)
CHOLEST/HDLC SERPL: 2.9 {RATIO} (ref 2–5)
CO2 SERPL-SCNC: 24 MMOL/L (ref 23–29)
CREAT SERPL-MCNC: 0.9 MG/DL (ref 0.5–1.4)
DIFFERENTIAL METHOD: NORMAL
EOSINOPHIL # BLD AUTO: 0.2 K/UL (ref 0–0.5)
EOSINOPHIL NFR BLD: 2.2 % (ref 0–8)
ERYTHROCYTE [DISTWIDTH] IN BLOOD BY AUTOMATED COUNT: 12.2 % (ref 11.5–14.5)
EST. GFR  (AFRICAN AMERICAN): >60 ML/MIN/1.73 M^2
EST. GFR  (NON AFRICAN AMERICAN): >60 ML/MIN/1.73 M^2
GLUCOSE SERPL-MCNC: 98 MG/DL (ref 70–110)
HCT VFR BLD AUTO: 45.3 % (ref 40–54)
HDLC SERPL-MCNC: 53 MG/DL (ref 40–75)
HDLC SERPL: 34.2 % (ref 20–50)
HGB BLD-MCNC: 15 G/DL (ref 14–18)
IMM GRANULOCYTES # BLD AUTO: 0.02 K/UL (ref 0–0.04)
IMM GRANULOCYTES NFR BLD AUTO: 0.3 % (ref 0–0.5)
LDLC SERPL CALC-MCNC: 88.4 MG/DL (ref 63–159)
LYMPHOCYTES # BLD AUTO: 1.8 K/UL (ref 1–4.8)
LYMPHOCYTES NFR BLD: 23.3 % (ref 18–48)
MCH RBC QN AUTO: 30.2 PG (ref 27–31)
MCHC RBC AUTO-ENTMCNC: 33.1 G/DL (ref 32–36)
MCV RBC AUTO: 91 FL (ref 82–98)
MONOCYTES # BLD AUTO: 0.8 K/UL (ref 0.3–1)
MONOCYTES NFR BLD: 10.5 % (ref 4–15)
NEUTROPHILS # BLD AUTO: 4.8 K/UL (ref 1.8–7.7)
NEUTROPHILS NFR BLD: 63 % (ref 38–73)
NONHDLC SERPL-MCNC: 102 MG/DL
NRBC BLD-RTO: 0 /100 WBC
PLATELET # BLD AUTO: 181 K/UL (ref 150–450)
PMV BLD AUTO: 11.9 FL (ref 9.2–12.9)
POTASSIUM SERPL-SCNC: 3.9 MMOL/L (ref 3.5–5.1)
PROT SERPL-MCNC: 7 G/DL (ref 6–8.4)
RBC # BLD AUTO: 4.96 M/UL (ref 4.6–6.2)
SODIUM SERPL-SCNC: 141 MMOL/L (ref 136–145)
TRIGL SERPL-MCNC: 68 MG/DL (ref 30–150)
WBC # BLD AUTO: 7.64 K/UL (ref 3.9–12.7)

## 2021-10-04 PROCEDURE — 80061 LIPID PANEL: CPT | Mod: HCNC | Performed by: INTERNAL MEDICINE

## 2021-10-04 PROCEDURE — 80053 COMPREHEN METABOLIC PANEL: CPT | Mod: HCNC | Performed by: INTERNAL MEDICINE

## 2021-10-04 PROCEDURE — 85025 COMPLETE CBC W/AUTO DIFF WBC: CPT | Mod: HCNC | Performed by: INTERNAL MEDICINE

## 2021-10-04 PROCEDURE — 36415 COLL VENOUS BLD VENIPUNCTURE: CPT | Mod: HCNC | Performed by: INTERNAL MEDICINE

## 2021-10-22 ENCOUNTER — IMMUNIZATION (OUTPATIENT)
Dept: INTERNAL MEDICINE | Facility: CLINIC | Age: 76
End: 2021-10-22
Payer: MEDICARE

## 2021-10-22 DIAGNOSIS — Z23 NEED FOR VACCINATION: Primary | ICD-10-CM

## 2021-10-22 PROCEDURE — 91301 COVID-19, MRNA, LNP-S, PF, 100 MCG/0.5 ML DOSE VACCINE: CPT | Mod: HCNC,PBBFAC | Performed by: FAMILY MEDICINE

## 2021-10-22 PROCEDURE — 0013A COVID-19, MRNA, LNP-S, PF, 100 MCG/0.5 ML DOSE VACCINE: CPT | Mod: HCNC,PBBFAC | Performed by: FAMILY MEDICINE

## 2022-01-03 PROBLEM — Z00.00 ANNUAL PHYSICAL EXAM: Status: RESOLVED | Noted: 2021-10-01 | Resolved: 2022-01-03

## 2022-01-08 NOTE — TELEPHONE ENCOUNTER
No new care gaps identified.  Powered by [employer]. Reference number: [id number].   1/08/2022 4:25:54 AM CST

## 2022-01-11 RX ORDER — ATORVASTATIN CALCIUM 20 MG/1
TABLET, FILM COATED ORAL
Qty: 45 TABLET | Refills: 2 | Status: SHIPPED | OUTPATIENT
Start: 2022-01-11 | End: 2022-04-01

## 2022-01-11 NOTE — TELEPHONE ENCOUNTER
Refill Authorization Note   Peng Sullivan  is requesting a refill authorization.  Brief Assessment and Rationale for Refill:  Approve     Medication Therapy Plan:       Medication Reconciliation Completed: No   Comments:   --->Care Gap information included below if applicable.   Orders Placed This Encounter    atorvastatin (LIPITOR) 20 MG tablet      Requested Prescriptions   Signed Prescriptions Disp Refills    atorvastatin (LIPITOR) 20 MG tablet 45 tablet 2     Sig: TAKE 1/2 TABLET ONE TIME DAILY       Cardiovascular:  Antilipid - Statins Passed - 1/8/2022  4:25 AM        Passed - Patient is at least 18 years old        Passed - Valid encounter within last 15 months     Recent Visits  Date Type Provider Dept   10/01/21 Office Visit Joe Torres MD [facility]   03/08/21 Office Visit Joe Torres MD [facility]   09/01/20 Office Visit Joe Torres MD [facility]   04/21/20 Office Visit Joe Torres MD [facility]   03/03/20 Office Visit Joe Torres MD [facility]   Showing recent visits within past 720 days and meeting all other requirements  Future Appointments  No visits were found meeting these conditions.  Showing future appointments within next 150 days and meeting all other requirements      Future Appointments              [facility] [address] [place] [address]                Passed - ALT is 131 or below and within 360 days     ALT   Date Value Ref Range Status   10/04/2021 31 10 - 44 U/L Final   09/01/2020 37 10 - 44 U/L Final   08/23/2019 32 9 - 46 U/L Final              Passed - AST is 119 or below and within 360 days     AST   Date Value Ref Range Status   10/04/2021 22 10 - 40 U/L Final   09/01/2020 30 15 - 46 U/L Final   08/23/2019 20 10 - 35 U/L Final              Passed - Total Cholesterol within 360 days     Lab Results    Component Value Date    CHOL 155 10/04/2021    CHOL 149 09/01/2020    CHOL 157 08/23/2019    CHLPL 157 02/19/2019              Passed - LDL within 360 days     LDL Cholesterol   Date Value Ref Range Status   10/04/2021 88.4 63.0 - 159.0 mg/dL Final     Comment:     The National Cholesterol Education Program (NCEP) has set the  following guidelines (reference values) for LDL Cholesterol:  Optimal.......................<130 mg/dL  Borderline High...............130-159 mg/dL  High..........................160-189 mg/dL  Very High.....................>190 mg/dL              Passed - HDL within 360 days     HDL   Date Value Ref Range Status   10/04/2021 53 40 - 75 mg/dL Final     Comment:     The National Cholesterol Education Program (NCEP) has set the  following guidelines (reference values) for HDL Cholesterol:  Low...............<40 mg/dL  Optimal...........>60 mg/dL     02/19/2019 59 mg/dL Final            Passed - Triglycerides within 360 days     Lab Results   Component Value Date    TRIG 68 10/04/2021    TRIG 77 09/01/2020    TRIG 87 08/23/2019                  Appointments  past 12m or future 3m with PCP    Date Provider   Last Visit   10/1/2021 Joe Torres MD   Next Visit   4/1/2022 Joe Torres MD   ED visits in past 90 days: 0     Note composed:2:33 PM 01/11/2022

## 2022-02-27 NOTE — TELEPHONE ENCOUNTER
No new care gaps identified.  Powered by [employer]. Reference number: [id number].   2/27/2022 3:36:58 AM CST

## 2022-03-02 RX ORDER — ATORVASTATIN CALCIUM 10 MG/1
10 TABLET, FILM COATED ORAL DAILY
Qty: 90 TABLET | Refills: 3 | Status: SHIPPED | OUTPATIENT
Start: 2022-03-02 | End: 2022-12-21

## 2022-03-02 RX ORDER — HYDROCHLOROTHIAZIDE 12.5 MG/1
12.5 CAPSULE ORAL DAILY
Qty: 90 CAPSULE | Refills: 3 | Status: SHIPPED | OUTPATIENT
Start: 2022-03-02 | End: 2023-04-25

## 2022-03-02 RX ORDER — ATORVASTATIN CALCIUM 10 MG/1
10 TABLET, FILM COATED ORAL DAILY
COMMUNITY
End: 2022-03-02 | Stop reason: SDUPTHER

## 2022-03-02 RX ORDER — LOSARTAN POTASSIUM 50 MG/1
50 TABLET ORAL DAILY
Qty: 90 TABLET | Refills: 3 | Status: SHIPPED | OUTPATIENT
Start: 2022-03-02 | End: 2022-12-21

## 2022-03-02 NOTE — TELEPHONE ENCOUNTER
No new care gaps identified.  Powered by [employer]. Reference number: [id number].   3/02/2022 8:30:23 AM CST

## 2022-03-04 RX ORDER — LOSARTAN POTASSIUM 50 MG/1
TABLET ORAL
Qty: 90 TABLET | Refills: 3 | OUTPATIENT
Start: 2022-03-04

## 2022-04-01 ENCOUNTER — OFFICE VISIT (OUTPATIENT)
Dept: INTERNAL MEDICINE | Facility: CLINIC | Age: 77
End: 2022-04-01
Payer: MEDICARE

## 2022-04-01 ENCOUNTER — IMMUNIZATION (OUTPATIENT)
Dept: PHARMACY | Facility: CLINIC | Age: 77
End: 2022-04-01
Payer: MEDICARE

## 2022-04-01 VITALS
SYSTOLIC BLOOD PRESSURE: 112 MMHG | DIASTOLIC BLOOD PRESSURE: 72 MMHG | HEIGHT: 71 IN | OXYGEN SATURATION: 98 % | HEART RATE: 74 BPM | WEIGHT: 249.88 LBS | BODY MASS INDEX: 34.98 KG/M2

## 2022-04-01 DIAGNOSIS — E78.2 MIXED HYPERLIPIDEMIA: ICD-10-CM

## 2022-04-01 DIAGNOSIS — I10 BENIGN ESSENTIAL HYPERTENSION: Primary | ICD-10-CM

## 2022-04-01 DIAGNOSIS — J30.1 SEASONAL ALLERGIC RHINITIS DUE TO POLLEN: ICD-10-CM

## 2022-04-01 DIAGNOSIS — Z23 NEED FOR VACCINATION: Primary | ICD-10-CM

## 2022-04-01 DIAGNOSIS — M51.36 DEGENERATIVE LUMBAR DISC: ICD-10-CM

## 2022-04-01 DIAGNOSIS — E66.9 CLASS 2 OBESITY WITH BODY MASS INDEX (BMI) OF 35.0 TO 35.9 IN ADULT, UNSPECIFIED OBESITY TYPE, UNSPECIFIED WHETHER SERIOUS COMORBIDITY PRESENT: ICD-10-CM

## 2022-04-01 PROCEDURE — 3074F PR MOST RECENT SYSTOLIC BLOOD PRESSURE < 130 MM HG: ICD-10-PCS | Mod: CPTII,S$GLB,, | Performed by: INTERNAL MEDICINE

## 2022-04-01 PROCEDURE — 99214 PR OFFICE/OUTPT VISIT, EST, LEVL IV, 30-39 MIN: ICD-10-PCS | Mod: S$GLB,,, | Performed by: INTERNAL MEDICINE

## 2022-04-01 PROCEDURE — 1160F RVW MEDS BY RX/DR IN RCRD: CPT | Mod: CPTII,S$GLB,, | Performed by: INTERNAL MEDICINE

## 2022-04-01 PROCEDURE — 1126F PR PAIN SEVERITY QUANTIFIED, NO PAIN PRESENT: ICD-10-PCS | Mod: CPTII,S$GLB,, | Performed by: INTERNAL MEDICINE

## 2022-04-01 PROCEDURE — 99999 PR PBB SHADOW E&M-EST. PATIENT-LVL III: CPT | Mod: PBBFAC,,, | Performed by: INTERNAL MEDICINE

## 2022-04-01 PROCEDURE — 99214 OFFICE O/P EST MOD 30 MIN: CPT | Mod: S$GLB,,, | Performed by: INTERNAL MEDICINE

## 2022-04-01 PROCEDURE — 3074F SYST BP LT 130 MM HG: CPT | Mod: CPTII,S$GLB,, | Performed by: INTERNAL MEDICINE

## 2022-04-01 PROCEDURE — 1160F PR REVIEW ALL MEDS BY PRESCRIBER/CLIN PHARMACIST DOCUMENTED: ICD-10-PCS | Mod: CPTII,S$GLB,, | Performed by: INTERNAL MEDICINE

## 2022-04-01 PROCEDURE — 3078F DIAST BP <80 MM HG: CPT | Mod: CPTII,S$GLB,, | Performed by: INTERNAL MEDICINE

## 2022-04-01 PROCEDURE — 1159F MED LIST DOCD IN RCRD: CPT | Mod: CPTII,S$GLB,, | Performed by: INTERNAL MEDICINE

## 2022-04-01 PROCEDURE — 1126F AMNT PAIN NOTED NONE PRSNT: CPT | Mod: CPTII,S$GLB,, | Performed by: INTERNAL MEDICINE

## 2022-04-01 PROCEDURE — 3078F PR MOST RECENT DIASTOLIC BLOOD PRESSURE < 80 MM HG: ICD-10-PCS | Mod: CPTII,S$GLB,, | Performed by: INTERNAL MEDICINE

## 2022-04-01 PROCEDURE — 99999 PR PBB SHADOW E&M-EST. PATIENT-LVL III: ICD-10-PCS | Mod: PBBFAC,,, | Performed by: INTERNAL MEDICINE

## 2022-04-01 PROCEDURE — 1159F PR MEDICATION LIST DOCUMENTED IN MEDICAL RECORD: ICD-10-PCS | Mod: CPTII,S$GLB,, | Performed by: INTERNAL MEDICINE

## 2022-04-01 NOTE — PROGRESS NOTES
[facility] Note    Chief Complaint      Chief Complaint   Patient presents with    Follow-up     6 month f/u       History of Present Illness      Peng Sullivan is a 77 y.o. male who presents today for   Chief Complaint   Patient presents with    Follow-up     6 month f/u   .  Patient comes to appointment here for 6 m checkup for chronic issues sa below . head full labs done with last visit in 10/21 . He will be getting 4th covid booster today . He is compliant with all meds , no medication changes since last visit     HPI    No problem-specific Assessment & Plan notes found for this encounter.       Problem List Items Addressed This Visit        Neuro    Degenerative lumbar disc    Overview     Stable               Cardiac/Vascular    Mixed hyperlipidemia    Overview      Cont current regimen atorvastatin is tolerating well . Needs labs with next visit            Benign essential hypertension - Primary    Overview     bp well controlled on current regimen . Cont same               Endocrine    Class 2 obesity with body mass index (BMI) of 35.0 to 35.9 in adult    Overview     counciled on diet and exercise. He continues with his walking program , ad he has been better with diet               Other    Seasonal allergic rhinitis due to pollen    Overview     Cont antihistamine and flonase                    Past Medical History:  Past Medical History:   Diagnosis Date    Cataract     Degenerative lumbar disc 3/8/2021    Diverticulosis     Hemorrhoids     High cholesterol     History of colon polyps 6/2/99, 6/3/19    Hypertension        Past Surgical History:  Past Surgical History:   Procedure Laterality Date    APPENDECTOMY  01[birth year]01/1954    [facility]/03/2019    [facility]    COLONOSCOPY N/A 07/19/2019    Procedure: COLONOSCOPY/pos emr;  Surgeon: Shantanu Lee MD;  Location: [facility] ([address] [place] [address]);  Service: Endoscopy;  Laterality: N/A;  Referred by Dr. THOMSON  Wali-see media tab for scanned records-      HT: 5'11, WT: 254.4, BMI: 35.5       Family History:  family history includes Cancer in his sister and sister; Cataracts in his sister; Heart attack in his father; No Known Problems in his brother, maternal aunt, maternal grandfather, maternal grandmother, maternal uncle, mother, paternal aunt, paternal grandfather, paternal grandmother, paternal uncle, sister, sister, and sister.     Social History:  Social History     Socioeconomic History    Marital status:    Tobacco Use    Smoking status: Former Smoker     Packs/day: 0.50     Years: 30.00     Pack years: 15.00     Types: Cigarettes     Quit date: 1989     Years since quittin.2    Smokeless tobacco: Former User     Quit date:    Substance and Sexual Activity    Alcohol use: Yes     Comment: rarely    Drug use: No    Sexual activity: Yes     Partners: Female     Birth control/protection: None       Review of Systems:   Review of Systems   Constitutional: Negative for fever and weight loss.   HENT: Negative for congestion, hearing loss and sore throat.    Eyes: Negative for blurred vision.   Respiratory: Negative for cough and shortness of breath.    Cardiovascular: Negative for chest pain, palpitations, claudication and leg swelling.   Gastrointestinal: Negative for abdominal pain, constipation, diarrhea and heartburn.   Genitourinary: Negative for dysuria.   Musculoskeletal: Negative for back pain and myalgias.   Skin: Negative for rash.   Neurological: Negative for focal weakness and headaches.   Psychiatric/Behavioral: Negative for depression and suicidal ideas. The patient is not nervous/anxious.         Medications:  Outpatient Encounter Medications as of 2022   Medication Sig Dispense Refill    atorvastatin (LIPITOR) 10 MG tablet Take 1 tablet (10 mg total) by mouth once daily. 90 tablet 3    cholecalciferol, vitamin D3, (VITAMIN D3) 25 mcg (1,000 unit) capsule        "hydroCHLOROthiazide (MICROZIDE) 12.5 mg capsule Take 1 capsule (12.5 mg total) by mouth once daily. 90 capsule 3    losartan (COZAAR) 50 MG tablet Take 1 tablet (50 mg total) by mouth once daily. 90 tablet 3    atorvastatin (LIPITOR) 20 MG tablet TAKE 1/2 TABLET ONE TIME DAILY (Patient not taking: Reported on 3/2/2022) 45 tablet 2     No facility-administered encounter medications on file as of 4/1/2022.       Allergies:  Review of patient's allergies indicates:  No Known Allergies      Physical Exam      Vitals:    04/01/22 0803   BP: 112/72   Pulse: 74        Vital Signs  Pulse: 74  SpO2: 98 %  BP: 112/72  BP Location: Left arm  Patient Position: Sitting  Pain Score: 0-No pain  Height and Weight  Height: 5' 11" (180.3 cm)  Weight: 113.3 kg (249 lb 14.3 oz)  BSA (Calculated - sq m): 2.38 sq meters  BMI (Calculated): 34.9  Weight in (lb) to have BMI = 25: 178.9]     Body mass index is 34.85 kg/m².    Physical Exam  Constitutional:       Appearance: He is well-developed.   HENT:      Head: Normocephalic.   Eyes:      Pupils: Pupils are equal, round, and reactive to light.   Neck:      Thyroid: No thyromegaly.   Cardiovascular:      Rate and Rhythm: Normal rate and regular rhythm.      Heart sounds: No murmur heard.    No friction rub. No gallop.   Pulmonary:      Effort: Pulmonary effort is normal.      Breath sounds: Normal breath sounds.   Abdominal:      General: Bowel sounds are normal.      Palpations: Abdomen is soft.   Musculoskeletal:         General: Normal range of motion.      Cervical back: Normal range of motion.   Skin:     General: Skin is warm and dry.   Neurological:      Mental Status: He is alert and oriented to person, place, and time.      Sensory: No sensory deficit.   Psychiatric:         Behavior: Behavior normal.          Laboratory:  CBC:  No results for input(s): WBC, RBC, HGB, HCT, PLT, MCV, MCH, MCHC in the last 2160 hours.  CMP:  No results for input(s): GLU, CALCIUM, ALBUMIN, PROT, " NA, K, CO2, CL, BUN, ALKPHOS, ALT, AST, BILITOT in the last 2160 hours.    Invalid input(s): CREATININ  URINALYSIS:  No results for input(s): COLORU, CLARITYU, SPECGRAV, PHUR, PROTEINUA, GLUCOSEU, BILIRUBINCON, BLOODU, WBCU, RBCU, BACTERIA, MUCUS, NITRITE, LEUKOCYTESUR, UROBILINOGEN, HYALINECASTS in the last 2160 hours.   LIPIDS:  No results for input(s): TSH, HDL, CHOL, TRIG, LDLCALC, CHOLHDL, NONHDLCHOL, TOTALCHOLEST in the last 2160 hours.  TSH:  No results for input(s): TSH in the last 2160 hours.  A1C:  No results for input(s): HGBA1C in the last 2160 hours.    Radiology:        Assessment:     Peng Sullivan is a 77 y.o.male with:    Benign essential hypertension    Class 2 obesity with body mass index (BMI) of 35.0 to 35.9 in adult, unspecified obesity type, unspecified whether serious comorbidity present    Degenerative lumbar disc    Mixed hyperlipidemia    Seasonal allergic rhinitis due to pollen                Plan:     Problem List Items Addressed This Visit        Neuro    Degenerative lumbar disc    Overview     Stable               Cardiac/Vascular    Mixed hyperlipidemia    Overview      Cont current regimen atorvastatin is tolerating well . Needs labs with next visit            Benign essential hypertension - Primary    Overview     bp well controlled on current regimen . Cont same               Endocrine    Class 2 obesity with body mass index (BMI) of 35.0 to 35.9 in adult    Overview     counciled on diet and exercise. He continues with his walking program , ad he has been better with diet               Other    Seasonal allergic rhinitis due to pollen    Overview     Cont antihistamine and flonase                   As above, continue current medications and maintain follow up with specialists.  Return to clinic in 6 months.      Frederick W Dantagnan [facility] [place]

## 2022-09-07 ENCOUNTER — LAB VISIT (OUTPATIENT)
Dept: LAB | Facility: HOSPITAL | Age: 77
End: 2022-09-07
Payer: MEDICARE

## 2022-09-07 ENCOUNTER — OFFICE VISIT (OUTPATIENT)
Dept: UROLOGY | Facility: CLINIC | Age: 77
End: 2022-09-07
Payer: MEDICARE

## 2022-09-07 VITALS
SYSTOLIC BLOOD PRESSURE: 127 MMHG | DIASTOLIC BLOOD PRESSURE: 79 MMHG | HEART RATE: 66 BPM | BODY MASS INDEX: 34.97 KG/M2 | HEIGHT: 71 IN | WEIGHT: 249.81 LBS

## 2022-09-07 DIAGNOSIS — R97.20 ELEVATED PSA: ICD-10-CM

## 2022-09-07 DIAGNOSIS — N13.8 BPH WITH URINARY OBSTRUCTION: Primary | ICD-10-CM

## 2022-09-07 DIAGNOSIS — N40.1 BPH WITH URINARY OBSTRUCTION: Primary | ICD-10-CM

## 2022-09-07 LAB — COMPLEXED PSA SERPL-MCNC: 7.9 NG/ML (ref 0–4)

## 2022-09-07 PROCEDURE — 84153 ASSAY OF PSA TOTAL: CPT | Performed by: NURSE PRACTITIONER

## 2022-09-07 PROCEDURE — 99999 PR PBB SHADOW E&M-EST. PATIENT-LVL III: ICD-10-PCS | Mod: PBBFAC,,, | Performed by: NURSE PRACTITIONER

## 2022-09-07 PROCEDURE — 1101F PR PT FALLS ASSESS DOC 0-1 FALLS W/OUT INJ PAST YR: ICD-10-PCS | Mod: CPTII,S$GLB,, | Performed by: NURSE PRACTITIONER

## 2022-09-07 PROCEDURE — 99999 PR PBB SHADOW E&M-EST. PATIENT-LVL III: CPT | Mod: PBBFAC,,, | Performed by: NURSE PRACTITIONER

## 2022-09-07 PROCEDURE — 1126F PR PAIN SEVERITY QUANTIFIED, NO PAIN PRESENT: ICD-10-PCS | Mod: CPTII,S$GLB,, | Performed by: NURSE PRACTITIONER

## 2022-09-07 PROCEDURE — 1159F MED LIST DOCD IN RCRD: CPT | Mod: CPTII,S$GLB,, | Performed by: NURSE PRACTITIONER

## 2022-09-07 PROCEDURE — 3074F SYST BP LT 130 MM HG: CPT | Mod: CPTII,S$GLB,, | Performed by: NURSE PRACTITIONER

## 2022-09-07 PROCEDURE — 3288F PR FALLS RISK ASSESSMENT DOCUMENTED: ICD-10-PCS | Mod: CPTII,S$GLB,, | Performed by: NURSE PRACTITIONER

## 2022-09-07 PROCEDURE — 3074F PR MOST RECENT SYSTOLIC BLOOD PRESSURE < 130 MM HG: ICD-10-PCS | Mod: CPTII,S$GLB,, | Performed by: NURSE PRACTITIONER

## 2022-09-07 PROCEDURE — 99214 OFFICE O/P EST MOD 30 MIN: CPT | Mod: S$GLB,,, | Performed by: NURSE PRACTITIONER

## 2022-09-07 PROCEDURE — 99214 PR OFFICE/OUTPT VISIT, EST, LEVL IV, 30-39 MIN: ICD-10-PCS | Mod: S$GLB,,, | Performed by: NURSE PRACTITIONER

## 2022-09-07 PROCEDURE — 3078F PR MOST RECENT DIASTOLIC BLOOD PRESSURE < 80 MM HG: ICD-10-PCS | Mod: CPTII,S$GLB,, | Performed by: NURSE PRACTITIONER

## 2022-09-07 PROCEDURE — 1126F AMNT PAIN NOTED NONE PRSNT: CPT | Mod: CPTII,S$GLB,, | Performed by: NURSE PRACTITIONER

## 2022-09-07 PROCEDURE — 36415 COLL VENOUS BLD VENIPUNCTURE: CPT | Performed by: NURSE PRACTITIONER

## 2022-09-07 PROCEDURE — 3078F DIAST BP <80 MM HG: CPT | Mod: CPTII,S$GLB,, | Performed by: NURSE PRACTITIONER

## 2022-09-07 PROCEDURE — 1101F PT FALLS ASSESS-DOCD LE1/YR: CPT | Mod: CPTII,S$GLB,, | Performed by: NURSE PRACTITIONER

## 2022-09-07 PROCEDURE — 1159F PR MEDICATION LIST DOCUMENTED IN MEDICAL RECORD: ICD-10-PCS | Mod: CPTII,S$GLB,, | Performed by: NURSE PRACTITIONER

## 2022-09-07 PROCEDURE — 3288F FALL RISK ASSESSMENT DOCD: CPT | Mod: CPTII,S$GLB,, | Performed by: NURSE PRACTITIONER

## 2022-09-07 NOTE — PROGRESS NOTES
CHIEF COMPLAINT:    Peng Sullivan is a 77 y.o. male presents today for Elevated PSA.    HISTORY OF PRESENTING ILLINESS:    Peng Sullivan is a 77 y.o. male with BPH and elevated PSA.  Has never had a prostate bx.   No family history of prostate cancer.   He was last seen in clinic 09/10/2021 with a PSA of 4.8     He is here today for elevated PSA.   PSA today is currently processing.   Ok with urination. Daytime frequency.  Nocturia 0-1x  Some mild ED; ok with how things are; not ready for any ED meds.     No abdominal or bone pain     Urinates when hears water running     03/05/2021 PSA 4.4 with PHI of 54.7  Previous: PSA was 4.1 on 09/01/2021.   Free PSA was 1.1 on both of the above tests             REVIEW OF SYSTEMS:  Review of Systems   Constitutional: Negative.  Negative for chills and fever.   Eyes:  Negative for double vision.   Respiratory:  Negative for cough and shortness of breath.    Cardiovascular:  Negative for chest pain and palpitations.   Gastrointestinal:  Negative for nausea and vomiting.   Genitourinary: Negative.  Negative for dysuria, flank pain and hematuria.   Musculoskeletal:  Positive for joint pain.        Right knee discomfort.      Neurological:  Negative for dizziness.       PATIENT HISTORY:    Past Medical History:   Diagnosis Date    Cataract     Degenerative lumbar disc 3/8/2021    Diverticulosis     Hemorrhoids     High cholesterol     History of colon polyps 6/2/99, 6/3/19    Hypertension        Past Surgical History:   Procedure Laterality Date    APPENDECTOMY  01/01/1954    COL[facility]OSCOPY  06/03/2019    [facility]    COLONOSCOPY N/A 07/19/2019    Procedure: COLONOSCOPY/pos emr;  Surgeon: Shantanu Lee MD;  Location: [address]);  Service: Endoscopy;  Laterality: N/A;  Referred by Dr. BERTO Keyes-see media tab for scanned records-      HT: 5'11, WT: 254.4, BMI: 35.5       Family History   Problem Relation Age of Onset    Cancer Sister         Colon cancer     Cataracts Sister     Heart attack Father     No Known Problems Mother     Cancer Sister     No Known Problems Sister     No Known Problems Sister     No Known Problems Sister     No Known Problems Brother     No Known Problems Maternal Aunt     No Known Problems Maternal Uncle     No Known Problems Paternal Aunt     No Known Problems Paternal Uncle     No Known Problems Maternal Grandmother     No Known Problems Maternal Grandfather     No Known Problems Paternal Grandmother     No Known Problems Paternal Grandfather     Amblyopia Neg Hx     Blindness Neg Hx     Diabetes Neg Hx     Glaucoma Neg Hx     Hypertension Neg Hx     Macular degeneration Neg Hx     Retinal detachment Neg Hx     Strabismus Neg Hx     Stroke Neg Hx     Thyroid disease Neg Hx        Social History     Socioeconomic History    Marital status:    Tobacco Use    Smoking status: Former     Packs/day: 0.50     Years: 30.00     Pack years: 15.00     Types: Cigarettes     Quit date: 1989     Years since quittin.7    Smokeless tobacco: Former     Quit date:    Substance and Sexual Activity    Alcohol use: Yes     Comment: rarely    Drug use: No    Sexual activity: Yes     Partners: Female     Birth control/protection: None       Allergies:  Patient has no known allergies.    Medications:    Current Outpatient Medications:     atorvastatin (LIPITOR) 10 MG tablet, Take 1 tablet (10 mg total) by mouth once daily., Disp: 90 tablet, Rfl: 3    cholecalciferol, vitamin D3, (VITAMIN D3) 25 mcg (1,000 unit) capsule, Taking every other day, Disp: , Rfl:     hydroCHLOROthiazide (MICROZIDE) 12.5 mg capsule, Take 1 capsule (12.5 mg total) by mouth once daily., Disp: 90 capsule, Rfl: 3    losartan (COZAAR) 50 MG tablet, Take 1 tablet (50 mg total) by mouth once daily., Disp: 90 tablet, Rfl: 3    PHYSICAL EXAMINATION:  Physical Exam  Vitals and nursing note reviewed.   Constitutional:       General: He is awake.      Appearance: Normal  appearance.   HENT:      Head: Normocephalic.      Right Ear: External ear normal.      Left Ear: External ear normal.      Nose: Nose normal.   Cardiovascular:      Rate and Rhythm: Normal rate.   Pulmonary:      Effort: Pulmonary effort is normal. No respiratory distress.   Abdominal:      Tenderness: There is no abdominal tenderness. There is no right CVA tenderness or left CVA tenderness.   Genitourinary:     Penis: Normal.       Testes: Normal.      Prostate: Enlarged. Not tender and no nodules present.      Rectum: Normal.      Comments: Prostate is 45gms; smooth    Musculoskeletal:         General: Normal range of motion.      Cervical back: Normal range of motion.   Skin:     General: Skin is warm and dry.   Neurological:      General: No focal deficit present.      Mental Status: He is alert and oriented to person, place, and time.   Psychiatric:         Mood and Affect: Mood normal.         Behavior: Behavior is cooperative.         LABS:          Lab Results   Component Value Date    PSA 3.5 03/03/2020    PSA 1.0 12/26/2007    PSADIAG 4.8 (H) 09/10/2021    PSADIAG 4.1 (H) 09/01/2020    PSATOTAL 4.4 (H) 09/01/2020 03/05/2021 PSA 4.4        IMPRESSION:    Encounter Diagnoses   Name Primary?    BPH with urinary obstruction Yes    Elevated PSA          Assessment:       1. BPH with urinary obstruction    2. Elevated PSA        Plan:         I spent 30 minutes with the patient of which more than half was spent in direct consultation with the patient in regards to our treatment and plan.  We addressed the office findings and recent labs.   Education and recommendations of today's plan of care including home remedies and needed follow up with PCP.   We discussed the chief complaint/LUTS and the possible contributory factors. Age adjusted PSA.   Reduce PM fluids;   Monitor ED  Recommended lifestyle modifications with proper, healthy diet, good hydration if no fluid restrictions; reducing bladder irritants.    Benefits of regular exercise approved by PCP.  F/u based on PSA.   -if elevated will repeat. MRI of prostate is warranted.

## 2022-09-08 ENCOUNTER — TELEPHONE (OUTPATIENT)
Dept: INTERNAL MEDICINE | Facility: CLINIC | Age: 77
End: 2022-09-08
Payer: MEDICARE

## 2022-09-08 DIAGNOSIS — E78.2 MIXED HYPERLIPIDEMIA: ICD-10-CM

## 2022-09-08 DIAGNOSIS — I10 BENIGN ESSENTIAL HYPERTENSION: Primary | ICD-10-CM

## 2022-09-08 NOTE — TELEPHONE ENCOUNTER
----- Message from Damari Gale sent at 9/8/2022  8:00 AM CDT -----  Contact: pt [phone]  Patient scheduled their Follow Up and is requesting labs prior to appt. Please enter order and contact patient to schedule.    Date of Follow Up:  10/07/2022    Would the patient rather a call back or a response via [facility]? call    Thank you

## 2022-09-12 ENCOUNTER — OFFICE VISIT (OUTPATIENT)
Dept: OPTOMETRY | Facility: CLINIC | Age: 77
End: 2022-09-12
Payer: COMMERCIAL

## 2022-09-12 DIAGNOSIS — H25.13 NUCLEAR SCLEROSIS OF BOTH EYES: ICD-10-CM

## 2022-09-12 DIAGNOSIS — H52.4 PRESBYOPIA OF BOTH EYES: ICD-10-CM

## 2022-09-12 DIAGNOSIS — I10 HYPERTENSION, UNSPECIFIED TYPE: ICD-10-CM

## 2022-09-12 DIAGNOSIS — Z13.5 SCREENING FOR EYE CONDITION: ICD-10-CM

## 2022-09-12 DIAGNOSIS — H43.811 VITREOUS DETACHMENT OF RIGHT EYE: ICD-10-CM

## 2022-09-12 DIAGNOSIS — H43.393 VITREOUS FLOATERS OF BOTH EYES: ICD-10-CM

## 2022-09-12 DIAGNOSIS — H52.223 REGULAR ASTIGMATISM OF BOTH EYES: ICD-10-CM

## 2022-09-12 DIAGNOSIS — Z01.00 EXAMINATION OF EYES AND VISION: Primary | ICD-10-CM

## 2022-09-12 DIAGNOSIS — H26.9 CORTICAL CATARACT OF BOTH EYES: ICD-10-CM

## 2022-09-12 PROCEDURE — 92015 DETERMINE REFRACTIVE STATE: CPT | Mod: S$GLB,,, | Performed by: OPTOMETRIST

## 2022-09-12 PROCEDURE — 92014 COMPRE OPH EXAM EST PT 1/>: CPT | Mod: S$GLB,,, | Performed by: OPTOMETRIST

## 2022-09-12 PROCEDURE — 92014 PR EYE EXAM, EST PATIENT,COMPREHESV: ICD-10-PCS | Mod: S$GLB,,, | Performed by: OPTOMETRIST

## 2022-09-12 PROCEDURE — 99999 PR PBB SHADOW E&M-EST. PATIENT-LVL II: CPT | Mod: PBBFAC,,, | Performed by: OPTOMETRIST

## 2022-09-12 PROCEDURE — 99999 PR PBB SHADOW E&M-EST. PATIENT-LVL II: ICD-10-PCS | Mod: PBBFAC,,, | Performed by: OPTOMETRIST

## 2022-09-12 PROCEDURE — 92015 PR REFRACTION: ICD-10-PCS | Mod: S$GLB,,, | Performed by: OPTOMETRIST

## 2022-09-12 NOTE — PATIENT INSTRUCTIONS
Early nuclear sclerotic/peripheral cortical cataract in both eyes, consistent with age, as noted previously.   Prior diagnosis of vitreous detachment in the right eye.   Bilateral vitreous floaters.       Otherwise, good ocular health in both eyes.   No hypertensive retinal changes in either eye.     Astigmatic refractive error in each eye, with good correctable VA in each eye.  Presbyopia.  New spectacle lens Rx issued for full-time wear.   No compelling need to replace lenses at this time.      Recheck in 12 - 18 months - or prior if any problems in the interim.
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
## 2022-10-07 ENCOUNTER — OFFICE VISIT (OUTPATIENT)
Dept: INTERNAL MEDICINE | Facility: CLINIC | Age: 77
End: 2022-10-07
Payer: MEDICARE

## 2022-10-07 ENCOUNTER — LAB VISIT (OUTPATIENT)
Dept: LAB | Facility: HOSPITAL | Age: 77
End: 2022-10-07
Attending: NURSE PRACTITIONER
Payer: MEDICARE

## 2022-10-07 ENCOUNTER — IMMUNIZATION (OUTPATIENT)
Dept: PHARMACY | Facility: CLINIC | Age: 77
End: 2022-10-07
Payer: MEDICARE

## 2022-10-07 VITALS
SYSTOLIC BLOOD PRESSURE: 120 MMHG | HEART RATE: 65 BPM | TEMPERATURE: 99 F | HEIGHT: 71 IN | WEIGHT: 242.63 LBS | DIASTOLIC BLOOD PRESSURE: 80 MMHG | RESPIRATION RATE: 18 BRPM | OXYGEN SATURATION: 97 % | BODY MASS INDEX: 33.97 KG/M2

## 2022-10-07 DIAGNOSIS — I10 BENIGN ESSENTIAL HYPERTENSION: ICD-10-CM

## 2022-10-07 DIAGNOSIS — E78.2 MIXED HYPERLIPIDEMIA: ICD-10-CM

## 2022-10-07 DIAGNOSIS — N40.1 BPH WITH URINARY OBSTRUCTION: ICD-10-CM

## 2022-10-07 DIAGNOSIS — Z23 NEED FOR VACCINATION: Primary | ICD-10-CM

## 2022-10-07 DIAGNOSIS — M51.36 DEGENERATIVE LUMBAR DISC: ICD-10-CM

## 2022-10-07 DIAGNOSIS — E66.9 OBESITY (BMI 30.0-34.9): ICD-10-CM

## 2022-10-07 DIAGNOSIS — N13.8 BPH WITH URINARY OBSTRUCTION: ICD-10-CM

## 2022-10-07 DIAGNOSIS — R97.20 ELEVATED PSA: ICD-10-CM

## 2022-10-07 DIAGNOSIS — Z23 NEED FOR VACCINATION: ICD-10-CM

## 2022-10-07 DIAGNOSIS — J30.1 SEASONAL ALLERGIC RHINITIS DUE TO POLLEN: ICD-10-CM

## 2022-10-07 DIAGNOSIS — Z00.00 ANNUAL PHYSICAL EXAM: Primary | ICD-10-CM

## 2022-10-07 PROBLEM — E66.811 OBESITY (BMI 30.0-34.9): Status: ACTIVE | Noted: 2020-03-03

## 2022-10-07 LAB
ALBUMIN SERPL BCP-MCNC: 4 G/DL (ref 3.5–5.2)
ALP SERPL-CCNC: 65 U/L (ref 55–135)
ALT SERPL W/O P-5'-P-CCNC: 24 U/L (ref 10–44)
ANION GAP SERPL CALC-SCNC: 9 MMOL/L (ref 8–16)
AST SERPL-CCNC: 19 U/L (ref 10–40)
BASOPHILS # BLD AUTO: 0.03 K/UL (ref 0–0.2)
BASOPHILS NFR BLD: 0.5 % (ref 0–1.9)
BILIRUB SERPL-MCNC: 0.6 MG/DL (ref 0.1–1)
BUN SERPL-MCNC: 21 MG/DL (ref 8–23)
CALCIUM SERPL-MCNC: 9.7 MG/DL (ref 8.7–10.5)
CHLORIDE SERPL-SCNC: 105 MMOL/L (ref 95–110)
CHOLEST SERPL-MCNC: 146 MG/DL (ref 120–199)
CHOLEST/HDLC SERPL: 2.9 {RATIO} (ref 2–5)
CO2 SERPL-SCNC: 29 MMOL/L (ref 23–29)
COMPLEXED PSA SERPL-MCNC: 7.4 NG/ML (ref 0–4)
CREAT SERPL-MCNC: 0.9 MG/DL (ref 0.5–1.4)
DIFFERENTIAL METHOD: ABNORMAL
EOSINOPHIL # BLD AUTO: 0.2 K/UL (ref 0–0.5)
EOSINOPHIL NFR BLD: 3.3 % (ref 0–8)
ERYTHROCYTE [DISTWIDTH] IN BLOOD BY AUTOMATED COUNT: 12.3 % (ref 11.5–14.5)
EST. GFR  (NO RACE VARIABLE): >60 ML/MIN/1.73 M^2
GLUCOSE SERPL-MCNC: 94 MG/DL (ref 70–110)
HCT VFR BLD AUTO: 45.4 % (ref 40–54)
HDLC SERPL-MCNC: 51 MG/DL (ref 40–75)
HDLC SERPL: 34.9 % (ref 20–50)
HGB BLD-MCNC: 14.5 G/DL (ref 14–18)
IMM GRANULOCYTES # BLD AUTO: 0.01 K/UL (ref 0–0.04)
IMM GRANULOCYTES NFR BLD AUTO: 0.2 % (ref 0–0.5)
LDLC SERPL CALC-MCNC: 81.6 MG/DL (ref 63–159)
LYMPHOCYTES # BLD AUTO: 1.8 K/UL (ref 1–4.8)
LYMPHOCYTES NFR BLD: 30.2 % (ref 18–48)
MCH RBC QN AUTO: 30.3 PG (ref 27–31)
MCHC RBC AUTO-ENTMCNC: 31.9 G/DL (ref 32–36)
MCV RBC AUTO: 95 FL (ref 82–98)
MONOCYTES # BLD AUTO: 0.6 K/UL (ref 0.3–1)
MONOCYTES NFR BLD: 10.4 % (ref 4–15)
NEUTROPHILS # BLD AUTO: 3.3 K/UL (ref 1.8–7.7)
NEUTROPHILS NFR BLD: 55.4 % (ref 38–73)
NONHDLC SERPL-MCNC: 95 MG/DL
NRBC BLD-RTO: 0 /100 WBC
PLATELET # BLD AUTO: 211 K/UL (ref 150–450)
PMV BLD AUTO: 12.7 FL (ref 9.2–12.9)
POTASSIUM SERPL-SCNC: 5.1 MMOL/L (ref 3.5–5.1)
PROT SERPL-MCNC: 6.8 G/DL (ref 6–8.4)
RBC # BLD AUTO: 4.79 M/UL (ref 4.6–6.2)
SODIUM SERPL-SCNC: 143 MMOL/L (ref 136–145)
TRIGL SERPL-MCNC: 67 MG/DL (ref 30–150)
WBC # BLD AUTO: 5.99 K/UL (ref 3.9–12.7)

## 2022-10-07 PROCEDURE — 85025 COMPLETE CBC W/AUTO DIFF WBC: CPT | Performed by: INTERNAL MEDICINE

## 2022-10-07 PROCEDURE — 90694 VACC AIIV4 NO PRSRV 0.5ML IM: CPT | Mod: S$GLB,,, | Performed by: INTERNAL MEDICINE

## 2022-10-07 PROCEDURE — 99397 PR PREVENTIVE VISIT,EST,65 & OVER: ICD-10-PCS | Mod: S$GLB,,, | Performed by: INTERNAL MEDICINE

## 2022-10-07 PROCEDURE — 1126F PR PAIN SEVERITY QUANTIFIED, NO PAIN PRESENT: ICD-10-PCS | Mod: CPTII,S$GLB,, | Performed by: INTERNAL MEDICINE

## 2022-10-07 PROCEDURE — 99999 PR PBB SHADOW E&M-EST. PATIENT-LVL III: ICD-10-PCS | Mod: PBBFAC,,, | Performed by: INTERNAL MEDICINE

## 2022-10-07 PROCEDURE — 1126F AMNT PAIN NOTED NONE PRSNT: CPT | Mod: CPTII,S$GLB,, | Performed by: INTERNAL MEDICINE

## 2022-10-07 PROCEDURE — 1101F PT FALLS ASSESS-DOCD LE1/YR: CPT | Mod: CPTII,S$GLB,, | Performed by: INTERNAL MEDICINE

## 2022-10-07 PROCEDURE — 99397 PER PM REEVAL EST PAT 65+ YR: CPT | Mod: S$GLB,,, | Performed by: INTERNAL MEDICINE

## 2022-10-07 PROCEDURE — G0008 FLU VACCINE - QUADRIVALENT - ADJUVANTED: ICD-10-PCS | Mod: S$GLB,,, | Performed by: INTERNAL MEDICINE

## 2022-10-07 PROCEDURE — 90694 FLU VACCINE - QUADRIVALENT - ADJUVANTED: ICD-10-PCS | Mod: S$GLB,,, | Performed by: INTERNAL MEDICINE

## 2022-10-07 PROCEDURE — 3074F SYST BP LT 130 MM HG: CPT | Mod: CPTII,S$GLB,, | Performed by: INTERNAL MEDICINE

## 2022-10-07 PROCEDURE — 3288F FALL RISK ASSESSMENT DOCD: CPT | Mod: CPTII,S$GLB,, | Performed by: INTERNAL MEDICINE

## 2022-10-07 PROCEDURE — 1159F MED LIST DOCD IN RCRD: CPT | Mod: CPTII,S$GLB,, | Performed by: INTERNAL MEDICINE

## 2022-10-07 PROCEDURE — 80053 COMPREHEN METABOLIC PANEL: CPT | Performed by: INTERNAL MEDICINE

## 2022-10-07 PROCEDURE — 3079F PR MOST RECENT DIASTOLIC BLOOD PRESSURE 80-89 MM HG: ICD-10-PCS | Mod: CPTII,S$GLB,, | Performed by: INTERNAL MEDICINE

## 2022-10-07 PROCEDURE — 3288F PR FALLS RISK ASSESSMENT DOCUMENTED: ICD-10-PCS | Mod: CPTII,S$GLB,, | Performed by: INTERNAL MEDICINE

## 2022-10-07 PROCEDURE — 84153 ASSAY OF PSA TOTAL: CPT | Performed by: NURSE PRACTITIONER

## 2022-10-07 PROCEDURE — 80061 LIPID PANEL: CPT | Performed by: INTERNAL MEDICINE

## 2022-10-07 PROCEDURE — 1160F RVW MEDS BY RX/DR IN RCRD: CPT | Mod: CPTII,S$GLB,, | Performed by: INTERNAL MEDICINE

## 2022-10-07 PROCEDURE — 3079F DIAST BP 80-89 MM HG: CPT | Mod: CPTII,S$GLB,, | Performed by: INTERNAL MEDICINE

## 2022-10-07 PROCEDURE — 1101F PR PT FALLS ASSESS DOC 0-1 FALLS W/OUT INJ PAST YR: ICD-10-PCS | Mod: CPTII,S$GLB,, | Performed by: INTERNAL MEDICINE

## 2022-10-07 PROCEDURE — 3074F PR MOST RECENT SYSTOLIC BLOOD PRESSURE < 130 MM HG: ICD-10-PCS | Mod: CPTII,S$GLB,, | Performed by: INTERNAL MEDICINE

## 2022-10-07 PROCEDURE — 1160F PR REVIEW ALL MEDS BY PRESCRIBER/CLIN PHARMACIST DOCUMENTED: ICD-10-PCS | Mod: CPTII,S$GLB,, | Performed by: INTERNAL MEDICINE

## 2022-10-07 PROCEDURE — 1159F PR MEDICATION LIST DOCUMENTED IN MEDICAL RECORD: ICD-10-PCS | Mod: CPTII,S$GLB,, | Performed by: INTERNAL MEDICINE

## 2022-10-07 PROCEDURE — 99999 PR PBB SHADOW E&M-EST. PATIENT-LVL III: CPT | Mod: PBBFAC,,, | Performed by: INTERNAL MEDICINE

## 2022-10-07 PROCEDURE — G0008 ADMIN INFLUENZA VIRUS VAC: HCPCS | Mod: S$GLB,,, | Performed by: INTERNAL MEDICINE

## 2022-10-07 PROCEDURE — 36415 COLL VENOUS BLD VENIPUNCTURE: CPT | Performed by: NURSE PRACTITIONER

## 2022-10-07 RX ORDER — BENZONATATE 200 MG/1
200 CAPSULE ORAL 3 TIMES DAILY PRN
Qty: 30 CAPSULE | Refills: 1 | Status: SHIPPED | OUTPATIENT
Start: 2022-10-07 | End: 2022-10-17

## 2022-10-07 NOTE — PROGRESS NOTES
[facility] Note    Chief Complaint      Chief Complaint   Patient presents with    Annual Exam       History of Present Illness      Peng Sullivan is a 77 y.o. male who presents today for   Chief Complaint   Patient presents with    Annual Exam   .  Patient comes to appointment here for [facility] visit . He is seeing dr ford urology, [facility] , [facility] , [facility] . He is completely independent of all adls , he denies difficulty with ambulating biht in and out of hime . He is wearing hearing aids . He denies anxiety or depression . Needs flu vaccine today . Has received covid x 4 .     HPI    No problem-specific Assessment & Plan notes found for this encounter.       Problem List Items Addressed This Visit          Neuro    Degenerative lumbar disc    Overview     Stable             ENT    Seasonal allergic rhinitis due to pollen    Overview     Cont antihistamine and flonase              Cardiac/Vascular    Mixed hyperlipidemia    Overview      Cont current regimen atorvastatin is tolerating well . Needs labs with next visit          Benign essential hypertension    Overview     bp well controlled on current regimen . Cont same             Endocrine    Obesity (BMI 30.0-34.9)    Overview     counciled on diet and exercise. He continues with his walking program , ad he has been better with diet             Other    Annual physical exam - Primary    Overview     pe documented all chronic issues as discussed full labs pending           Other Visit Diagnoses       Need for vaccination                 Past Medical History:  Past Medical History:   Diagnosis Date    Cataract     Degenerative lumbar disc 3/8/2021    Diverticulosis     Hemorrhoids     High cholesterol     History of colon polyps 6/2/99, 6/3/19    Hypertension        Past Surgical History:  Past Surgical History:   Procedure Laterality Date    APPENDECTOMY  01/01/1954    COLONOSCOPY  06/03/2019     [facility]    COLONOSCOPY N/A 2019    Procedure: COLONOSCOPY/pos emr;  Surgeon: Shantanu Lee MD;  Location: [facility] ([address] [place] [address]);  Service: Endoscopy;  Laterality: N/A;  Referred by Dr. BERTO Keyes-see media tab for scanned records-      HT: 5'11, WT: 254.4, BMI: 35.5       Family History:  family history includes Cancer in his sister and sister; Cataracts in his sister; Heart attack in his father; Heart disease in his father; No Known Problems in his brother, maternal aunt, maternal grandfather, maternal grandmother, maternal uncle, mother, paternal aunt, paternal grandfather, paternal grandmother, paternal uncle, sister, sister, and sister.     Social History:  Social History     Socioeconomic History    Marital status:    Tobacco Use    Smoking status: Former     Packs/day: 0.50     Years: 20.00     Pack years: 10.00     Types: Cigarettes     Start date: 1960     Quit date: 1980     Years since quittin.7    Smokeless tobacco: Former     Quit date:    Substance and Sexual Activity    Alcohol use: Not Currently     Comment: 12 beers per year.    Drug use: No    Sexual activity: Yes     Partners: Female     Birth control/protection: None     Social Determinants of Health     Financial Resource Strain: Low Risk     Difficulty of Paying Living Expenses: Not hard at all   Food Insecurity: No Food Insecurity    Worried About Running Out of Food in the Last Year: Never true    Ran Out of Food in the Last Year: Never true   Transportation Needs: No Transportation Needs    Lack of Transportation (Medical): No    Lack of Transportation (Non-Medical): No   Physical Activity: Sufficiently Active    Days of Exercise per Week: 5 days    Minutes of Exercise per Session: 30 min   Stress: No Stress Concern Present    Feeling of Stress : Not at all   Social Connections: Unknown    Frequency of Communication with Friends and Family: More than three times a week    Frequency of Social  Gatherings with Friends and Family: Once a week    Active Member of Clubs or Organizations: Yes    Attends Club or Organization Meetings: More than 4 times per year    Marital Status:    Housing Stability: Low Risk     Unable to Pay for Housing in the Last Year: No    Number of Places Lived in the Last Year: 1    Unstable Housing in the Last Year: No       Review of Systems:   Review of Systems   Constitutional:  Negative for fever and weight loss.   HENT:  Positive for hearing loss. Negative for congestion and sore throat.    Eyes:  Negative for blurred vision.   Respiratory:  Negative for cough and shortness of breath.    Cardiovascular:  Negative for chest pain, palpitations, claudication and leg swelling.   Gastrointestinal:  Negative for abdominal pain, constipation, diarrhea and heartburn.   Genitourinary:  Negative for dysuria.   Musculoskeletal:  Negative for back pain and myalgias.   Skin:  Negative for rash.   Neurological:  Negative for focal weakness and headaches.   Psychiatric/Behavioral:  Negative for depression and suicidal ideas. The patient is not nervous/anxious.       Medications:  Outpatient Encounter Medications as of 10/7/2022   Medication Sig Dispense Refill    atorvastatin (LIPITOR) 10 MG tablet Take 1 tablet (10 mg total) by mouth once daily. 90 tablet 3    cholecalciferol, vitamin D3, (VITAMIN D3) 25 mcg (1,000 unit) capsule Taking every other day      hydroCHLOROthiazide (MICROZIDE) 12.5 mg capsule Take 1 capsule (12.5 mg total) by mouth once daily. 90 capsule 3    losartan (COZAAR) 50 MG tablet Take 1 tablet (50 mg total) by mouth once daily. 90 tablet 3    benzonatate (TESSALON) 200 MG capsule Take 1 capsule (200 mg total) by mouth 3 (three) times daily as needed for Cough. 30 capsule 1     No facility-administered encounter medications on file as of 10/7/2022.       Allergies:  Review of patient's allergies indicates:  No Known Allergies      Physical Exam      Vitals:     "10/07/22 0810   BP: 120/80   Pulse: 65   Resp: 18   Temp: 98.6 °F (37 °C)        Vital Signs  Temp: 98.6 °F (37 °C)  Temp src: Oral  Pulse: 65  Resp: 18  SpO2: 97 %  BP: 120/80  BP Location: Right arm  Patient Position: Sitting  Pain Score: 0-No pain  Height and Weight  Height: 5' 11" (180.3 cm)  Weight: 110.1 kg (242 lb 9.9 oz)  BSA (Calculated - sq m): 2.35 sq meters  BMI (Calculated): 33.9  Weight in (lb) to have BMI = 25: 178.9]     Body mass index is 33.84 kg/m².    Physical Exam  Constitutional:       Appearance: He is well-developed.   HENT:      Head: Normocephalic.   Eyes:      Pupils: Pupils are equal, round, and reactive to light.   Neck:      Thyroid: No thyromegaly.   Cardiovascular:      Rate and Rhythm: Normal rate and regular rhythm.      Heart sounds: No murmur heard.    No friction rub. No gallop.   Pulmonary:      Effort: Pulmonary effort is normal.      Breath sounds: Normal breath sounds.   Abdominal:      General: Bowel sounds are normal.      Palpations: Abdomen is soft.   Musculoskeletal:         General: Normal range of motion.      Cervical back: Normal range of motion.   Skin:     General: Skin is warm and dry.   Neurological:      Mental Status: He is alert and oriented to person, place, and time.      Sensory: No sensory deficit.   Psychiatric:         Behavior: Behavior normal.        Laboratory:  CBC:  No results for input(s): WBC, RBC, HGB, HCT, PLT, MCV, MCH, MCHC in the last 2160 hours.  CMP:  No results for input(s): GLU, CALCIUM, ALBUMIN, PROT, NA, K, CO2, CL, BUN, ALKPHOS, ALT, AST, BILITOT in the last 2160 hours.    Invalid input(s): CREATININ  URINALYSIS:  No results for input(s): COLORU, CLARITYU, SPECGRAV, PHUR, PROTEINUA, GLUCOSEU, BILIRUBINCON, BLOODU, WBCU, RBCU, BACTERIA, MUCUS, NITRITE, LEUKOCYTESUR, UROBILINOGEN, HYALINECASTS in the last 2160 hours.   LIPIDS:  No results for input(s): TSH, HDL, CHOL, TRIG, LDLCALC, CHOLHDL, NONHDLCHOL, TOTALCHOLEST in the last 2160 " hours.  TSH:  No results for input(s): TSH in the last 2160 hours.  A1C:  No results for input(s): HGBA1C in the last 2160 hours.    Radiology:        Assessment:     Peng Sullivan is a 77 y.o.male with:    Annual physical exam    Need for vaccination  -     Influenza (FLUAD) - Quadrivalent (Adjuvanted) *Preferred* (65+) (PF)    Benign essential hypertension    Mixed hyperlipidemia    Seasonal allergic rhinitis due to pollen  -     benzonatate (TESSALON) 200 MG capsule; Take 1 capsule (200 mg total) by mouth 3 (three) times daily as needed for Cough.  Dispense: 30 capsule; Refill: 1    Degenerative lumbar disc    Obesity (BMI 30.0-34.9)              Plan:     Problem List Items Addressed This Visit          Neuro    Degenerative lumbar disc    Overview     Stable             ENT    Seasonal allergic rhinitis due to pollen    Overview     Cont antihistamine and flonase              Cardiac/Vascular    Mixed hyperlipidemia    Overview      Cont current regimen atorvastatin is tolerating well . Needs labs with next visit          Benign essential hypertension    Overview     bp well controlled on current regimen . Cont same             Endocrine    Obesity (BMI 30.0-34.9)    Overview     counciled on diet and exercise. He continues with his walking program , ad he has been better with diet             Other    Annual physical exam - Primary    Overview     pe documented all chronic issues as discussed full labs pending           Other Visit Diagnoses       Need for vaccination                As above, continue current medications and maintain follow up with specialists.  Return to clinic in 6 months.      Frederick W Dantagnan [facility] [place]

## 2022-10-08 ENCOUNTER — PATIENT MESSAGE (OUTPATIENT)
Dept: UROLOGY | Facility: CLINIC | Age: 77
End: 2022-10-08
Payer: MEDICARE

## 2022-10-10 ENCOUNTER — PATIENT MESSAGE (OUTPATIENT)
Dept: UROLOGY | Facility: CLINIC | Age: 77
End: 2022-10-10
Payer: MEDICARE

## 2022-10-10 ENCOUNTER — PATIENT MESSAGE (OUTPATIENT)
Dept: INTERNAL MEDICINE | Facility: CLINIC | Age: 77
End: 2022-10-10
Payer: MEDICARE

## 2022-10-10 DIAGNOSIS — R97.20 ELEVATED PSA: Primary | ICD-10-CM

## 2022-11-04 ENCOUNTER — HOSPITAL ENCOUNTER (OUTPATIENT)
Dept: RADIOLOGY | Facility: HOSPITAL | Age: 77
Discharge: HOME | End: 2022-11-04
Attending: NURSE PRACTITIONER
Payer: MEDICARE

## 2022-11-04 DIAGNOSIS — R97.20 ELEVATED PSA: ICD-10-CM

## 2022-11-04 PROCEDURE — A9585 GADOBUTROL INJECTION: HCPCS | Performed by: NURSE PRACTITIONER

## 2022-11-04 PROCEDURE — 25500020 PHARM REV CODE 255: Performed by: NURSE PRACTITIONER

## 2022-11-04 PROCEDURE — 72197 MRI PELVIS W/O & W/DYE: CPT | Mod: 26,,, | Performed by: INTERNAL MEDICINE

## 2022-11-04 PROCEDURE — 72197 MRI PELVIS W/O & W/DYE: CPT | Mod: TC

## 2022-11-04 PROCEDURE — 72197 MRI PROSTATE W W/O CONTRAST: ICD-10-PCS | Mod: 26,,, | Performed by: INTERNAL MEDICINE

## 2022-11-04 RX ORDER — GADOBUTROL 604.72 MG/ML
10 INJECTION INTRAVENOUS
Status: COMPLETED | OUTPATIENT
Start: 2022-11-04 | End: 2022-11-04

## 2022-11-04 RX ADMIN — GADOBUTROL 10 ML: 604.72 INJECTION INTRAVENOUS at 06:11

## 2022-11-07 ENCOUNTER — PATIENT MESSAGE (OUTPATIENT)
Dept: UROLOGY | Facility: CLINIC | Age: 77
End: 2022-11-07
Payer: MEDICARE

## 2022-11-07 DIAGNOSIS — N13.8 BPH WITH URINARY OBSTRUCTION: ICD-10-CM

## 2022-11-07 DIAGNOSIS — R97.20 ELEVATED PSA: Primary | ICD-10-CM

## 2022-11-07 DIAGNOSIS — N40.1 BPH WITH URINARY OBSTRUCTION: ICD-10-CM

## 2022-11-08 ENCOUNTER — PATIENT MESSAGE (OUTPATIENT)
Dept: INTERNAL MEDICINE | Facility: CLINIC | Age: 77
End: 2022-11-08
Payer: MEDICARE

## 2022-11-08 DIAGNOSIS — R97.20 ELEVATED PSA: Primary | ICD-10-CM

## 2022-11-08 DIAGNOSIS — R93.89 ABNORMAL MRI: ICD-10-CM

## 2022-11-08 RX ORDER — LIDOCAINE HYDROCHLORIDE 20 MG/ML
JELLY TOPICAL
Status: COMPLETED | OUTPATIENT
Start: 2022-11-15 | End: 2022-11-30

## 2022-11-08 RX ORDER — CEFTRIAXONE 1 G/1
1 INJECTION, POWDER, FOR SOLUTION INTRAMUSCULAR; INTRAVENOUS
Status: COMPLETED | OUTPATIENT
Start: 2022-11-15 | End: 2022-11-30

## 2022-11-08 RX ORDER — LIDOCAINE HYDROCHLORIDE 10 MG/ML
10 INJECTION INFILTRATION; PERINEURAL
Status: COMPLETED | OUTPATIENT
Start: 2022-11-15 | End: 2022-11-30

## 2022-11-08 NOTE — PROGRESS NOTES
Elevated PSA with abnormal MRI of prostate  1.8 cm PI-RADS 3 lesion in the left anterior transition zone at the midgland    UroNav TRUS bx recommended

## 2022-11-09 DIAGNOSIS — R97.20 ELEVATED PSA: Primary | ICD-10-CM

## 2022-11-09 DIAGNOSIS — R93.89 ABNORMAL MRI: ICD-10-CM

## 2022-11-09 RX ORDER — CIPROFLOXACIN 500 MG/1
500 TABLET ORAL ONCE
Qty: 1 TABLET | Refills: 0 | Status: SHIPPED | OUTPATIENT
Start: 2022-11-30 | End: 2022-11-30

## 2022-11-09 NOTE — TELEPHONE ENCOUNTER
Needs MRI of prostate.     Lab Results   Component Value Date    PSA 3.5 03/03/2020    PSA 1.0 12/26/2007    PSADIAG 7.4 (H) 10/07/2022    PSADIAG 7.9 (H) 09/07/2022    PSADIAG 4.8 (H) 09/10/2021    PSADIAG 4.1 (H) 09/01/2020    PSATOTAL 4.4 (H) 09/01/2020    PSAFREE 1.11 09/01/2020    PSAFREEPCT 25.23 09/01/2020

## 2022-11-21 ENCOUNTER — PATIENT MESSAGE (OUTPATIENT)
Dept: UROLOGY | Facility: CLINIC | Age: 77
End: 2022-11-21
Payer: MEDICARE

## 2022-11-29 ENCOUNTER — PATIENT MESSAGE (OUTPATIENT)
Dept: UROLOGY | Facility: CLINIC | Age: 77
End: 2022-11-29
Payer: MEDICARE

## 2022-11-30 ENCOUNTER — PROCEDURE VISIT (OUTPATIENT)
Dept: UROLOGY | Facility: CLINIC | Age: 77
End: 2022-11-30
Payer: MEDICARE

## 2022-11-30 VITALS
WEIGHT: 240 LBS | DIASTOLIC BLOOD PRESSURE: 85 MMHG | HEIGHT: 71 IN | TEMPERATURE: 98 F | BODY MASS INDEX: 33.6 KG/M2 | RESPIRATION RATE: 16 BRPM | HEART RATE: 75 BPM | SYSTOLIC BLOOD PRESSURE: 138 MMHG

## 2022-11-30 DIAGNOSIS — R93.89 ABNORMAL MRI: ICD-10-CM

## 2022-11-30 DIAGNOSIS — R97.20 ELEVATED PSA: ICD-10-CM

## 2022-11-30 PROCEDURE — 96372 THER/PROPH/DIAG INJ SC/IM: CPT | Mod: 59,S$GLB,, | Performed by: UROLOGY

## 2022-11-30 PROCEDURE — 55700 TRANSRECTAL ULTRASOUND W/ BIOPSY: CPT | Mod: S$GLB,,, | Performed by: UROLOGY

## 2022-11-30 PROCEDURE — 76872 TRANSRECTAL ULTRASOUND W/ BIOPSY: ICD-10-PCS | Mod: 26,S$GLB,, | Performed by: UROLOGY

## 2022-11-30 PROCEDURE — 76872 US TRANSRECTAL: CPT | Mod: 26,S$GLB,, | Performed by: UROLOGY

## 2022-11-30 PROCEDURE — 88341 IMHCHEM/IMCYTCHM EA ADD ANTB: CPT | Mod: 26,,, | Performed by: PATHOLOGY

## 2022-11-30 PROCEDURE — 88341 PR IHC OR ICC EACH ADD'L SINGLE ANTIBODY  STAINPR: ICD-10-PCS | Mod: 26,,, | Performed by: PATHOLOGY

## 2022-11-30 PROCEDURE — 88305 TISSUE EXAM BY PATHOLOGIST: CPT | Performed by: PATHOLOGY

## 2022-11-30 PROCEDURE — 88342 IMHCHEM/IMCYTCHM 1ST ANTB: CPT | Performed by: PATHOLOGY

## 2022-11-30 PROCEDURE — 88341 IMHCHEM/IMCYTCHM EA ADD ANTB: CPT | Performed by: PATHOLOGY

## 2022-11-30 PROCEDURE — 96372 PR INJECTION,THERAP/PROPH/DIAG2ST, IM OR SUBCUT: ICD-10-PCS | Mod: 59,S$GLB,, | Performed by: UROLOGY

## 2022-11-30 PROCEDURE — 88342 IMHCHEM/IMCYTCHM 1ST ANTB: CPT | Mod: 26,,, | Performed by: PATHOLOGY

## 2022-11-30 PROCEDURE — 88305 TISSUE EXAM BY PATHOLOGIST: ICD-10-PCS | Mod: 26,,, | Performed by: PATHOLOGY

## 2022-11-30 PROCEDURE — 88342 CHG IMMUNOCYTOCHEMISTRY: ICD-10-PCS | Mod: 26,,, | Performed by: PATHOLOGY

## 2022-11-30 PROCEDURE — 55700 TRANSRECTAL ULTRASOUND W/ BIOPSY: ICD-10-PCS | Mod: S$GLB,,, | Performed by: UROLOGY

## 2022-11-30 PROCEDURE — 88305 TISSUE EXAM BY PATHOLOGIST: CPT | Mod: 26,,, | Performed by: PATHOLOGY

## 2022-11-30 RX ADMIN — LIDOCAINE HYDROCHLORIDE: 20 JELLY TOPICAL at 09:11

## 2022-11-30 RX ADMIN — LIDOCAINE HYDROCHLORIDE 10 ML: 10 INJECTION INFILTRATION; PERINEURAL at 09:11

## 2022-11-30 RX ADMIN — CEFTRIAXONE 1 G: 1 INJECTION, POWDER, FOR SOLUTION INTRAMUSCULAR; INTRAVENOUS at 09:11

## 2022-11-30 NOTE — PROCEDURES
"Transrectal Ultrasound w/ Biopsy    Date/Time: 11/30/2022 9:45 AM  Performed by: Jumana Niocle MD  Authorized by: Ana Kim NP     Consent Done?:  Yes (Written)  Time out: Immediately prior to procedure a "time out" was called to verify the correct patient, procedure, equipment, support staff and site/side marked as required.    Indications: Elevated PSA    Preparation: Patient was prepped and draped in usual sterile fashion    Position:  Left lateral  Anesthesia:  10cc's 1% Lidocaine and Lidocaine jelly  Prostate Size:  30cc  Lesions:: Yes         Type:  Hypoechoic  Left Base Biopsies: 2  Left Mid Biopsies: 2  Left [place] Biopsies: 2  Right Base Biopsies: 2  Right Mid Biopsies: 2  Right [place] Biopsies: 2  Total Biopsies:  15    Patient tolerance:  Patient tolerated the procedure well with no immediate complications     3 target biopsies - uronav  "

## 2022-11-30 NOTE — PATIENT INSTRUCTIONS
What to Expect After a Prostate Biopsy    You may have mild bleeding from the rectum or urine for about 1 week to 1 month, or in your ejaculate for several months. This bleeding is normal and expected, and it will stop. You may have mild discomfort in your rectal or urethral area for 24-48 hours.    You cannot do any strenuous lifting, straining, or exercising for 24 hours. You may return to full activity the day after the biopsy.    You may continue to take all your regular medications after the procedure except for the blood thinners.    You may resume all blood-thinning medications once you no longer see any bleeding or whenever your physician prescribing the medication says it is all right to do so. You may take Tylenol if you have a fever and your temperature is less than 100° F or if you have some discomfort.    You will receive a call from the Urology Department at [facility] with the results of your prostate biopsy within one week.    Signs and Symptoms to Report    Call your [facility] urologist at [phone] if you develop any of the following:  Temperature greater than 101°  F  Inability to urinate  A large amount of bleeding from the rectum or in the urine  Persistent or severe pain    After hours or on weekends, you may reach a urology resident on call at this number: [phone].

## 2022-12-14 ENCOUNTER — PATIENT MESSAGE (OUTPATIENT)
Dept: INTERNAL MEDICINE | Facility: CLINIC | Age: 77
End: 2022-12-14
Payer: MEDICARE

## 2022-12-14 ENCOUNTER — PATIENT MESSAGE (OUTPATIENT)
Dept: UROLOGY | Facility: CLINIC | Age: 77
End: 2022-12-14
Payer: MEDICARE

## 2022-12-14 LAB
FINAL PATHOLOGIC DIAGNOSIS: NORMAL
GROSS: NORMAL
Lab: NORMAL
MICROSCOPIC EXAM: NORMAL

## 2022-12-21 ENCOUNTER — TELEPHONE (OUTPATIENT)
Dept: UROLOGY | Facility: CLINIC | Age: 77
End: 2022-12-21
Payer: MEDICARE

## 2022-12-21 ENCOUNTER — PATIENT MESSAGE (OUTPATIENT)
Dept: UROLOGY | Facility: CLINIC | Age: 77
End: 2022-12-21
Payer: MEDICARE

## 2022-12-21 DIAGNOSIS — C61 PROSTATE CANCER: Primary | ICD-10-CM

## 2022-12-21 NOTE — TELEPHONE ENCOUNTER
He had a (+) biopsy; low grade but needs to see someone,   Dr. Cartagena/Vargas,.   Spoke with patient informed him that Marzena will call to schedule his follow up appointment.

## 2022-12-28 NOTE — PROGRESS NOTES
[facility]  Urologic Oncology Clinic Note        Date of Service: 12/29/2022      Chief Complaint/Reason for Consultation: Low Risk Prostate Cancer    Requesting Provider:   Ana Kim, NP  [address] [place] [address]      History of Present Illness:   Patient 77 y.o. male presents with new diagnosis of low risk prostate cancer.     No family hx of prostate cancer.   Only voiding complaint is daytime frequency. Nocturia 0-1. Some mild ED, not taking PDE5I.    Urologic History:     10/7/2022 -- PSA 7.4  11/4/2022 -- mpMRI prostate -- PV 32cc, PSA 7.4, PSAD 0.23; MARKUS 1 PIRADS 3 Left anterior TZ  11/30/2022 -- UroNav + systematic bx -- [place] 3+3 1/1 cores, target negative, all other cores benign  12/29/2022 -- IPSS 13, nocturia 0-1; EUGENE 21    Patient Active Problem List    Diagnosis Date Noted    Annual physical exam 10/01/2021    Degenerative lumbar disc 03/08/2021    Seasonal allergic rhinitis due to pollen 04/21/2020    Obesity (BMI 30.0-34.9) 03/03/2020    Prostate cancer screening 03/03/2020    Bronchitis 11/19/2019    Mixed hyperlipidemia 08/22/2019    Benign essential hypertension 08/22/2019    Colon adenoma 07/19/2019    Posterior vitreous detachment of both eyes 10/25/2012    Nuclear sclerotic cataract of both eyes 10/25/2012          Review of patient's allergies indicates:  No Known Allergies     Past Medical History:   Diagnosis Date    Cataract     Degenerative lumbar disc 3/8/2021    Diverticulosis     Hemorrhoids     High cholesterol     History of colon polyps 6/2/99, 6/3/19    Hypertension       Past Surgical History:   Procedure Laterality Date    APPENDECTOMY  01/01/1954    COLONOSCOPY  06/03/2019    [facility]    COLONOSCOPY N/A 07/19/2019    Procedure: COLONOSCOPY/pos emr;  Surgeon: Shantanu Lee MD;  Location: [facility] ([address] [place] [address]);  Service: Endoscopy;  Laterality: N/A;  Referred by Dr. BERTO Keyes-see media tab for scanned records-      HT: 5'11, WT: 254.4,  "BMI: 35.5      Family History   Problem Relation Age of Onset    Cancer Sister         Colon cancer    Cataracts Sister     Heart attack Father     Heart disease Father         Congestive heart failure    No Known Problems Mother     Cancer Sister         Breast cancer    No Known Problems Sister     No Known Problems Sister     No Known Problems Sister     No Known Problems Brother     No Known Problems Maternal Aunt     No Known Problems Maternal Uncle     No Known Problems Paternal Aunt     No Known Problems Paternal Uncle     No Known Problems Maternal Grandmother     No Known Problems Maternal Grandfather     No Known Problems Paternal Grandmother     No Known Problems Paternal Grandfather     Amblyopia Neg Hx     Blindness Neg Hx     Diabetes Neg Hx     Glaucoma Neg Hx     Hypertension Neg Hx     Macular degeneration Neg Hx     Retinal detachment Neg Hx     Strabismus Neg Hx     Stroke Neg Hx     Thyroid disease Neg Hx       Social History     Tobacco Use    Smoking status: Former     Packs/day: 0.50     Years: 20.00     Pack years: 10.00     Types: Cigarettes     Start date: 1960     Quit date: 1980     Years since quittin.0    Smokeless tobacco: Former     Quit date:    Substance Use Topics    Alcohol use: Not Currently     Comment: 12 beers per year.        Review of Systems   Constitutional:  Negative for activity change, fatigue and fever.   HENT:  Negative for congestion.    Respiratory:  Negative for cough.    Cardiovascular:  Negative for chest pain.   Gastrointestinal:  Negative for abdominal pain.   Genitourinary:  Negative for dysuria and hematuria.           OBJECTIVE:     Vitals:    22 0803   BP: 129/77   BP Location: Left arm   Patient Position: Sitting   Pulse: 64   Resp: 18   Weight: 110.9 kg (244 lb 8 oz)   Height: 5' 11.5" (1.816 m)       General Appearance: Alert, cooperative, no distress  Head: Normocephalic  Eyes: Clear conjunctiva  Neck: No obvious LND or " JVD  Lungs: Normal chest excursion, no accessory muscle use  Chest: Regular rate rhythm by palpation, no pedal edema  Abdomen: Soft, non-tender, non-distended  Male Genitalia: CHEYENNE negative  Extremities: Atraumatic   Lymph Nodes: No appreciable lymph adenopathy  Neurologic: No gross gait, motor or sensory deficits            LAB:        Lab Results   Component Value Date    PSA 3.5 03/03/2020    PSA 1.0 12/26/2007    PSADIAG 7.4 (H) 10/07/2022    PSADIAG 7.9 (H) 09/07/2022    PSADIAG 4.8 (H) 09/10/2021    PSADIAG 4.1 (H) 09/01/2020    PSATOTAL 4.4 (H) 09/01/2020    PSAFREE 1.11 09/01/2020    PSAFREEPCT 25.23 09/01/2020     CMP  Sodium   Date Value Ref Range Status   10/07/2022 143 136 - 145 mmol/L Final     Potassium   Date Value Ref Range Status   10/07/2022 5.1 3.5 - 5.1 mmol/L Final     Chloride   Date Value Ref Range Status   10/07/2022 105 95 - 110 mmol/L Final     CO2   Date Value Ref Range Status   10/07/2022 29 23 - 29 mmol/L Final     Glucose   Date Value Ref Range Status   10/07/2022 94 70 - 110 mg/dL Final     BUN   Date Value Ref Range Status   10/07/2022 21 8 - 23 mg/dL Final     Creatinine   Date Value Ref Range Status   10/07/2022 0.9 0.5 - 1.4 mg/dL Final     Calcium   Date Value Ref Range Status   10/07/2022 9.7 8.7 - 10.5 mg/dL Final     Total Protein   Date Value Ref Range Status   10/07/2022 6.8 6.0 - 8.4 g/dL Final     Albumin   Date Value Ref Range Status   10/07/2022 4.0 3.5 - 5.2 g/dL Final     Total Bilirubin   Date Value Ref Range Status   10/07/2022 0.6 0.1 - 1.0 mg/dL Final     Comment:     For infants and newborns, interpretation of results should be based  on gestational age, weight and in agreement with clinical  observations.    Premature Infant recommended reference ranges:  Up to 24 hours.............<8.0 mg/dL  Up to 48 hours............<12.0 mg/dL  3-5 days..................<15.0 mg/dL  6-29 days.................<15.0 mg/dL       Alkaline Phosphatase   Date Value Ref Range Status    10/07/2022 65 55 - 135 U/L Final     AST   Date Value Ref Range Status   10/07/2022 19 10 - 40 U/L Final     ALT   Date Value Ref Range Status   10/07/2022 24 10 - 44 U/L Final     Anion Gap   Date Value Ref Range Status   10/07/2022 9 8 - 16 mmol/L Final     eGFR   Date Value Ref Range Status   10/07/2022 >60.0 >60 mL/min/1.73 m^2 Final         IMAGIN/4/2022  MRI PROSTATE W W/O CONTRAST     CLINICAL HISTORY:  Prostate cancer suspected;  Elevated prostate specific antigen (PSA)     Additional history: None provided.     TECHNIQUE:  Multiparametric MRI of the prostate/pelvis performed on a 3T scanner with phase pelvic coil. Multiplanar, multisequence images including high resolution, small field-of-view T2-WI; axial diffusion weighted images with multiple B-values and creation of ADC-maps; and dynamic contrast enhanced T1-weighted images through the prostate were obtained before, during, and after the administration of 10 cc intravenous gadolinium.     COMPARISON:  None.     FINDINGS:  Diffusion-weighted sequences are degraded by susceptibility artifact from air in the rectum.     Previous biopsy: None     PSA: 7.4 ng/mL 10/07/2022     Prior therapy: None     Prostate: 4.3 x 3.6 x 4.6 cm corresponding to a computed volume of 32 cc.     Peripheral zone: Atrophic right anterior peripheral zone with bandlike T2 hypointensities, likely reflecting fibrosis.  No focal lesion concerning for prostate cancer.     Transitional zone: Benign prostatic hyperplasia.  There is a focal lesion in the left transition zone, as follows:     Lesion (MARKUS) #T-1     Location: Side: left; Region: mid; Zone: Anterior transition zone     Greatest dimension: 1.8 cm     T2-WI: Mostly encapsulated nodule, score 2.     DWI/ADC: Focal markedly hypointense on ADC and markedly hyperintense on high b-value DWI; >1.5 cm in greatest dimension, score 5.     DCE: Positive     Extraprostatic extension: Negative     PI-RADS assessment category:  3     Neurovascular bundle: Normal appearance.     Seminal vesicles: Normal appearance.     Adjacent Organ Involvement: No evidence for urinary bladder or rectal invasion.  No focal or asymmetric urinary bladder wall thickening.     Lymphadenopathy: None.     Other Findings: None.     Impression:     1.8 cm PI-RADS 3 lesion in the left anterior transition zone at the midgland.     Overall Assessment: PI-RADS 3 - Intermediate (the presence of clinically significant cancer is equivocal)     Number of targets created for potential MR/US fusion biopsy     Peripheral zone: 0     Transition zone: 1     Electronically signed by resident: Fabian Snyder MD  Date:                                            11/04/2022  Time:                                           09:48     Electronically signed by: Nilton Sharma  Date:                                            11/04/2022  Time:                                           15:46        ASSESSMENT/PLAN:     78 yo M w/ hx of low risk prostate cancer     Plan:    Today we reviewed the incidence, risk factors, and natural history of prostate cancer and [place] 3+3 disease which the patient was diagnosed with previously.     After we reviewed the treatment options for prostate cancer which include but are not limited to surgery, radiation, cryo, HIFU, hormones and minimally invasive options [laser, focal therapy etc], we spoke of active surveillance (AS) as a management strategy for his NCCN low risk prostate cancer. I pointed out the differences between AS and watchful waiting (WW) whereby the former approach utilizes periodic reassessment with the possibility of later entering active treatment when cure is still possible compared to the latter which does not assess for cancer progression but will initiate palliative therapy if the patient becomes symptomatic.  We reviewed some of the symptoms of advanced disease including weight loss, bone pain, worsened LUTS etc.   I told the  patient that by electing AS or no treatment, and because this is prostate cancer, there is always the chance for cancer progression and loss of the window of curability. This is a risk that the patient must be willing to accept.  Active surveillance has not been prospectively validated in clinical trials in intermediate-risk disease, but it may be considered in favorable intermediate-risk disease.  However, for men with intermediate risk disease, active surveillance may come with a higher risk of developing metastases compared to definitive treatment. Other drawbacks of AS include the need for continual monitoring with PSA, clinic visits, occasional prostate biopsies and their attendant risks/costs and possibly imaging studies.  For some patients on a surveillance strategy, there may be a psychological burden to obviating active treatment of prostate cancer.     We outlined various methods to monitor patients on AS, which at a minimum includes every 6-12 month PSAs, DREs and visits.  Initially I recommend a confirmatory test within 6 months of diagnosis with repeat biopsy or mpMRI, though I favor biopsy. If our confirmatory test reveals the same grade and volume of cancer then we will continue on with AS with surveillance using a low or high-intensity plan. Then we talk about some reasons to transition to active treatment such as (1) a greater volume of cancer detected at biopsy, (2) higher grade cancer at biopsy, (3) patient concern or preference.      Finally I told the patient that even though we might think the patient is a candidate for AS, understaging and undergrading does occur, and knowledge of whether the patient is actually a good candidate is incomplete.  I could not make any guarantee that the patient was making the right decision or that his cancer may later be cured if the patient eventually opted for treatment. The patient expressed understanding of these issues.     Lastly, I did tell him that with  his age and such low volume 3+3 prostate cancer that I think observation would be a more reasonable management strategy. I told him that his risk of passing from prostate cancer in his lifetime is very low and that I do not think that he would be a candidate for treatment even if he mild grade progression since its still likely that with low volume 3+4 he still could pass away as a result of something other than prostate cancer.     Patient was allowed to ask questions, all were answered, he was accompanied by his wife, they appeared to understand counseling well.     They wish to choose observation as a management strategy. Plan to RTC if PSA rises 10.          The total time for the established patient visit was at least 40 minutes in both face-to-face and non-face-to-face activities, which included chart review, interpretation of results, counseling, education, ordering meds/tests/procedures, and/or coordination of care.     Maury Cartagena MD  Urologic Oncology  P: [phone]

## 2022-12-29 ENCOUNTER — OFFICE VISIT (OUTPATIENT)
Dept: UROLOGY | Facility: CLINIC | Age: 77
End: 2022-12-29
Payer: MEDICARE

## 2022-12-29 VITALS
HEART RATE: 64 BPM | HEIGHT: 72 IN | BODY MASS INDEX: 33.12 KG/M2 | RESPIRATION RATE: 18 BRPM | SYSTOLIC BLOOD PRESSURE: 129 MMHG | DIASTOLIC BLOOD PRESSURE: 77 MMHG | WEIGHT: 244.5 LBS

## 2022-12-29 DIAGNOSIS — C61 PROSTATE CANCER: ICD-10-CM

## 2022-12-29 PROCEDURE — 1160F PR REVIEW ALL MEDS BY PRESCRIBER/CLIN PHARMACIST DOCUMENTED: ICD-10-PCS | Mod: HCNC,CPTII,S$GLB, | Performed by: STUDENT IN AN ORGANIZED HEALTH CARE EDUCATION/TRAINING PROGRAM

## 2022-12-29 PROCEDURE — 3288F PR FALLS RISK ASSESSMENT DOCUMENTED: ICD-10-PCS | Mod: HCNC,CPTII,S$GLB, | Performed by: STUDENT IN AN ORGANIZED HEALTH CARE EDUCATION/TRAINING PROGRAM

## 2022-12-29 PROCEDURE — 99215 OFFICE O/P EST HI 40 MIN: CPT | Mod: HCNC,S$GLB,, | Performed by: STUDENT IN AN ORGANIZED HEALTH CARE EDUCATION/TRAINING PROGRAM

## 2022-12-29 PROCEDURE — 3074F PR MOST RECENT SYSTOLIC BLOOD PRESSURE < 130 MM HG: ICD-10-PCS | Mod: HCNC,CPTII,S$GLB, | Performed by: STUDENT IN AN ORGANIZED HEALTH CARE EDUCATION/TRAINING PROGRAM

## 2022-12-29 PROCEDURE — 1160F RVW MEDS BY RX/DR IN RCRD: CPT | Mod: HCNC,CPTII,S$GLB, | Performed by: STUDENT IN AN ORGANIZED HEALTH CARE EDUCATION/TRAINING PROGRAM

## 2022-12-29 PROCEDURE — 1125F PR PAIN SEVERITY QUANTIFIED, PAIN PRESENT: ICD-10-PCS | Mod: HCNC,CPTII,S$GLB, | Performed by: STUDENT IN AN ORGANIZED HEALTH CARE EDUCATION/TRAINING PROGRAM

## 2022-12-29 PROCEDURE — 3078F DIAST BP <80 MM HG: CPT | Mod: HCNC,CPTII,S$GLB, | Performed by: STUDENT IN AN ORGANIZED HEALTH CARE EDUCATION/TRAINING PROGRAM

## 2022-12-29 PROCEDURE — 1101F PR PT FALLS ASSESS DOC 0-1 FALLS W/OUT INJ PAST YR: ICD-10-PCS | Mod: HCNC,CPTII,S$GLB, | Performed by: STUDENT IN AN ORGANIZED HEALTH CARE EDUCATION/TRAINING PROGRAM

## 2022-12-29 PROCEDURE — 1101F PT FALLS ASSESS-DOCD LE1/YR: CPT | Mod: HCNC,CPTII,S$GLB, | Performed by: STUDENT IN AN ORGANIZED HEALTH CARE EDUCATION/TRAINING PROGRAM

## 2022-12-29 PROCEDURE — 3074F SYST BP LT 130 MM HG: CPT | Mod: HCNC,CPTII,S$GLB, | Performed by: STUDENT IN AN ORGANIZED HEALTH CARE EDUCATION/TRAINING PROGRAM

## 2022-12-29 PROCEDURE — 99999 PR PBB SHADOW E&M-EST. PATIENT-LVL III: ICD-10-PCS | Mod: PBBFAC,HCNC,, | Performed by: STUDENT IN AN ORGANIZED HEALTH CARE EDUCATION/TRAINING PROGRAM

## 2022-12-29 PROCEDURE — 1159F PR MEDICATION LIST DOCUMENTED IN MEDICAL RECORD: ICD-10-PCS | Mod: HCNC,CPTII,S$GLB, | Performed by: STUDENT IN AN ORGANIZED HEALTH CARE EDUCATION/TRAINING PROGRAM

## 2022-12-29 PROCEDURE — 3078F PR MOST RECENT DIASTOLIC BLOOD PRESSURE < 80 MM HG: ICD-10-PCS | Mod: HCNC,CPTII,S$GLB, | Performed by: STUDENT IN AN ORGANIZED HEALTH CARE EDUCATION/TRAINING PROGRAM

## 2022-12-29 PROCEDURE — 99999 PR PBB SHADOW E&M-EST. PATIENT-LVL III: CPT | Mod: PBBFAC,HCNC,, | Performed by: STUDENT IN AN ORGANIZED HEALTH CARE EDUCATION/TRAINING PROGRAM

## 2022-12-29 PROCEDURE — 1159F MED LIST DOCD IN RCRD: CPT | Mod: HCNC,CPTII,S$GLB, | Performed by: STUDENT IN AN ORGANIZED HEALTH CARE EDUCATION/TRAINING PROGRAM

## 2022-12-29 PROCEDURE — 99215 PR OFFICE/OUTPT VISIT, EST, LEVL V, 40-54 MIN: ICD-10-PCS | Mod: HCNC,S$GLB,, | Performed by: STUDENT IN AN ORGANIZED HEALTH CARE EDUCATION/TRAINING PROGRAM

## 2022-12-29 PROCEDURE — 3288F FALL RISK ASSESSMENT DOCD: CPT | Mod: HCNC,CPTII,S$GLB, | Performed by: STUDENT IN AN ORGANIZED HEALTH CARE EDUCATION/TRAINING PROGRAM

## 2022-12-29 PROCEDURE — 1125F AMNT PAIN NOTED PAIN PRSNT: CPT | Mod: HCNC,CPTII,S$GLB, | Performed by: STUDENT IN AN ORGANIZED HEALTH CARE EDUCATION/TRAINING PROGRAM

## 2022-12-29 RX ORDER — GLUCOSAMINE/CHONDRO SU A 500-400 MG
1 TABLET ORAL 2 TIMES DAILY
COMMUNITY
End: 2023-06-05

## 2022-12-29 NOTE — LETTER
December 29, 2022        Joe Torres MD  [address]  [address], 4th Floor  [facility] [address]             [facility] - Urology 2nd Fl  [address]  [facility] [address]  Phone: [phone]   Patient: Peng Sullivan   MR Number: [id number]   YOB: 1945   Date of Visit: 12/29/2022       Dear Dr. Torres:    Thank you for referring Peng Sullivan to me for evaluation. Below are the relevant portions of my assessment and plan of care.      Thank you for allowing me to see this patient consultation for prostate cancer after recent biopsy.  During his visit he was accompanied by his wife, and we did speak in detail about the incidents risk factors natural history of prostate cancer and specifically [place] 3 + 3 prostate cancer.  To this end, based on national guidelines and his low volume of disease coupled with his age I believe that he is a better candidate for observation rather than true active surveillance for [place] 3 + 3 prostate cancer.  I did inform him that based on available data there is very limited risk in metastasis from this type of cancer and he is out likely to outlive it.  I also told him that even if he had grade progression of his prostate cancer on active surveillance he still could out live his prostate cancer diagnosis without active treatment as result I think observation would be the next best step.  I think he should return to clinic for a discussion about repeat biopsy if his PSA rises to 10.     Patient appeared to understand all counseling well and was accompanied by his wife.  Both were in agreement with the plan.          If you have questions, please do not hesitate to call me [phone]. I look forward to following Peng along with you.    Sincerely,      Maury Cartagena MD           CC  No Recipients

## 2023-01-09 PROBLEM — Z00.00 ANNUAL PHYSICAL EXAM: Status: RESOLVED | Noted: 2021-10-01 | Resolved: 2023-01-09

## 2023-02-07 ENCOUNTER — TELEPHONE (OUTPATIENT)
Dept: PRIMARY CARE CLINIC | Facility: CLINIC | Age: 78
End: 2023-02-07
Payer: MEDICARE

## 2023-02-07 DIAGNOSIS — Z00.00 ENCOUNTER FOR MEDICARE ANNUAL WELLNESS EXAM: ICD-10-CM

## 2023-02-07 NOTE — TELEPHONE ENCOUNTER
----- Message from Lisa Perez sent at 2/7/2023 12:47 PM CST -----  Contact: Pt [phone]  Caller is requesting an earlier appointment then we can schedule.  Caller is requesting a message be sent to the provider.     If this is for the patients physical, did you offer to schedule next available and put on wait list, or to see NP or PA for their physical?  Yes    When is the next available appointment with their provider:  June    Reason for the appointment:  He and his wife's annual visit on the same day in April

## 2023-02-09 DIAGNOSIS — Z00.00 ENCOUNTER FOR MEDICARE ANNUAL WELLNESS EXAM: ICD-10-CM

## 2023-03-01 ENCOUNTER — TELEPHONE (OUTPATIENT)
Dept: PRIMARY CARE CLINIC | Facility: CLINIC | Age: 78
End: 2023-03-01
Payer: MEDICARE

## 2023-03-01 DIAGNOSIS — J06.9 VIRAL URI WITH COUGH: Primary | ICD-10-CM

## 2023-03-01 RX ORDER — BENZONATATE 100 MG/1
100 CAPSULE ORAL 3 TIMES DAILY PRN
Qty: 30 CAPSULE | Refills: 0 | Status: SHIPPED | OUTPATIENT
Start: 2023-03-01 | End: 2023-03-11

## 2023-03-01 NOTE — TELEPHONE ENCOUNTER
Pt c/o dry cough for the last 3 days. He has some slight congestion and a runny nose, denies any other symptoms. Has been taking OTC major cough and cold geared to pt's with HTN       Asking if something for cough could be sent to pharmacy

## 2023-03-01 NOTE — TELEPHONE ENCOUNTER
----- Message from Tressa Bosch sent at 3/1/2023  9:52 AM CST -----  Contact: self/[phone]  Pt called in regard to getting a Rx due to having a bad cough. Call back      [id number] - [place], [place] - [address] AT [facility] [address]  [facility] [address]  Phone: [phone] Fax: [phone]    Please advise

## 2023-03-29 ENCOUNTER — PATIENT MESSAGE (OUTPATIENT)
Dept: PRIMARY CARE CLINIC | Facility: CLINIC | Age: 78
End: 2023-03-29
Payer: MEDICARE

## 2023-03-29 DIAGNOSIS — M79.671 RIGHT FOOT PAIN: Primary | ICD-10-CM

## 2023-03-30 ENCOUNTER — HOSPITAL ENCOUNTER (OUTPATIENT)
Dept: RADIOLOGY | Facility: HOSPITAL | Age: 78
Discharge: HOME | End: 2023-03-30
Attending: PODIATRIST
Payer: MEDICARE

## 2023-03-30 DIAGNOSIS — M79.671 RIGHT FOOT PAIN: ICD-10-CM

## 2023-03-30 DIAGNOSIS — M79.671 RIGHT FOOT PAIN: Primary | ICD-10-CM

## 2023-03-30 PROCEDURE — 73630 XR FOOT COMPLETE 3 VIEW RIGHT: ICD-10-PCS | Mod: 26,RT,, | Performed by: RADIOLOGY

## 2023-03-30 PROCEDURE — 73630 X-RAY EXAM OF FOOT: CPT | Mod: TC,RT

## 2023-03-30 PROCEDURE — 73630 X-RAY EXAM OF FOOT: CPT | Mod: 26,RT,, | Performed by: RADIOLOGY

## 2023-03-31 ENCOUNTER — OFFICE VISIT (OUTPATIENT)
Dept: PODIATRY | Facility: CLINIC | Age: 78
End: 2023-03-31
Payer: MEDICARE

## 2023-03-31 VITALS
SYSTOLIC BLOOD PRESSURE: 148 MMHG | WEIGHT: 236.13 LBS | HEART RATE: 74 BPM | RESPIRATION RATE: 18 BRPM | DIASTOLIC BLOOD PRESSURE: 74 MMHG | HEIGHT: 72 IN | BODY MASS INDEX: 31.98 KG/M2

## 2023-03-31 DIAGNOSIS — M77.41 METATARSALGIA, RIGHT FOOT: ICD-10-CM

## 2023-03-31 DIAGNOSIS — M79.671 RIGHT FOOT PAIN: Primary | ICD-10-CM

## 2023-03-31 DIAGNOSIS — M21.861 ACQUIRED POSTERIOR EQUINUS OF RIGHT LOWER EXTREMITY: ICD-10-CM

## 2023-03-31 DIAGNOSIS — M77.51 BURSITIS OF INTERMETATARSAL BURSA OF RIGHT FOOT: ICD-10-CM

## 2023-03-31 DIAGNOSIS — G57.61 NEUROMA OF SECOND INTERSPACE OF RIGHT FOOT: ICD-10-CM

## 2023-03-31 PROCEDURE — 99203 PR OFFICE/OUTPT VISIT, NEW, LEVL III, 30-44 MIN: ICD-10-PCS | Mod: 25,HCNC,S$GLB, | Performed by: PODIATRIST

## 2023-03-31 PROCEDURE — 3078F DIAST BP <80 MM HG: CPT | Mod: HCNC,CPTII,S$GLB, | Performed by: PODIATRIST

## 2023-03-31 PROCEDURE — 3288F FALL RISK ASSESSMENT DOCD: CPT | Mod: HCNC,CPTII,S$GLB, | Performed by: PODIATRIST

## 2023-03-31 PROCEDURE — 1160F RVW MEDS BY RX/DR IN RCRD: CPT | Mod: HCNC,CPTII,S$GLB, | Performed by: PODIATRIST

## 2023-03-31 PROCEDURE — 99999 PR PBB SHADOW E&M-EST. PATIENT-LVL IV: ICD-10-PCS | Mod: PBBFAC,HCNC,, | Performed by: PODIATRIST

## 2023-03-31 PROCEDURE — 3077F SYST BP >= 140 MM HG: CPT | Mod: HCNC,CPTII,S$GLB, | Performed by: PODIATRIST

## 2023-03-31 PROCEDURE — 3078F PR MOST RECENT DIASTOLIC BLOOD PRESSURE < 80 MM HG: ICD-10-PCS | Mod: HCNC,CPTII,S$GLB, | Performed by: PODIATRIST

## 2023-03-31 PROCEDURE — 1159F MED LIST DOCD IN RCRD: CPT | Mod: HCNC,CPTII,S$GLB, | Performed by: PODIATRIST

## 2023-03-31 PROCEDURE — 1125F PR PAIN SEVERITY QUANTIFIED, PAIN PRESENT: ICD-10-PCS | Mod: HCNC,CPTII,S$GLB, | Performed by: PODIATRIST

## 2023-03-31 PROCEDURE — 99203 OFFICE O/P NEW LOW 30 MIN: CPT | Mod: 25,HCNC,S$GLB, | Performed by: PODIATRIST

## 2023-03-31 PROCEDURE — 1101F PT FALLS ASSESS-DOCD LE1/YR: CPT | Mod: HCNC,CPTII,S$GLB, | Performed by: PODIATRIST

## 2023-03-31 PROCEDURE — 3077F PR MOST RECENT SYSTOLIC BLOOD PRESSURE >= 140 MM HG: ICD-10-PCS | Mod: HCNC,CPTII,S$GLB, | Performed by: PODIATRIST

## 2023-03-31 PROCEDURE — 64455 NEUROMA INJECTION FOOT: ICD-10-PCS | Mod: HCNC,RT,S$GLB, | Performed by: PODIATRIST

## 2023-03-31 PROCEDURE — 3288F PR FALLS RISK ASSESSMENT DOCUMENTED: ICD-10-PCS | Mod: HCNC,CPTII,S$GLB, | Performed by: PODIATRIST

## 2023-03-31 PROCEDURE — 99999 PR PBB SHADOW E&M-EST. PATIENT-LVL IV: CPT | Mod: PBBFAC,HCNC,, | Performed by: PODIATRIST

## 2023-03-31 PROCEDURE — 1125F AMNT PAIN NOTED PAIN PRSNT: CPT | Mod: HCNC,CPTII,S$GLB, | Performed by: PODIATRIST

## 2023-03-31 PROCEDURE — 1101F PR PT FALLS ASSESS DOC 0-1 FALLS W/OUT INJ PAST YR: ICD-10-PCS | Mod: HCNC,CPTII,S$GLB, | Performed by: PODIATRIST

## 2023-03-31 PROCEDURE — 1160F PR REVIEW ALL MEDS BY PRESCRIBER/CLIN PHARMACIST DOCUMENTED: ICD-10-PCS | Mod: HCNC,CPTII,S$GLB, | Performed by: PODIATRIST

## 2023-03-31 PROCEDURE — 64455 NJX AA&/STRD PLTR COM DG NRV: CPT | Mod: HCNC,RT,S$GLB, | Performed by: PODIATRIST

## 2023-03-31 PROCEDURE — 1159F PR MEDICATION LIST DOCUMENTED IN MEDICAL RECORD: ICD-10-PCS | Mod: HCNC,CPTII,S$GLB, | Performed by: PODIATRIST

## 2023-03-31 RX ORDER — DEXAMETHASONE SODIUM PHOSPHATE 4 MG/ML
4 INJECTION, SOLUTION INTRA-ARTICULAR; INTRALESIONAL; INTRAMUSCULAR; INTRAVENOUS; SOFT TISSUE
Status: DISCONTINUED | OUTPATIENT
Start: 2023-03-31 | End: 2023-03-31 | Stop reason: HOSPADM

## 2023-03-31 RX ADMIN — DEXAMETHASONE SODIUM PHOSPHATE 4 MG: 4 INJECTION, SOLUTION INTRA-ARTICULAR; INTRALESIONAL; INTRAMUSCULAR; INTRAVENOUS; SOFT TISSUE at 08:03

## 2023-03-31 NOTE — PROGRESS NOTES
[facility] - [facility]  [address] [place] [address], [address]  [place] [address]  Dept: [phone]  Dept Fax: [phone]    Karl Joseph Jr., DPM     Assessment:   MDM    Coding  1. Right foot pain  Ambulatory referral/consult to Podiatry      2. Neuroma of second interspace of right foot  Neuroma Injection Foot      3. Bursitis of intermetatarsal bursa of right foot  Neuroma Injection Foot      4. Metatarsalgia, right foot  Neuroma Injection Foot      5. 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            Plan:     Neuroma Injection Foot    Date/Time: 3/31/2023 8:14 AM  Performed by: Karl Joseph Jr., DPM  Authorized by: Karl Joseph Jr., DPM     Consent Done?:  Yes (Verbal)  Indications:  Pain  Site marked: the procedure site was marked    Timeout: prior to procedure the correct patient, procedure, and site was verified    Prep: patient was prepped and draped in usual sterile fashion      Local anesthesia used?: Yes   Anesthesia:  Local infiltration  Anesthetic total (ml):  2    Location Right Foot:  Second Webspace  Needle size:  25 G  Approach:  Dorsal  Medications:  4 mg dexAMETHasone 4 mg/mL  Patient tolerance:  Patient tolerated the procedure well with no immediate complications    Peng was seen today for foot pain.    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 All questions/concerns addressed  -all tx options discussed. All alternatives, risks, benefits of all txs discussed  -the patient was educated about the diagnosis  -We discussed conservative care options possible  including but not limited to shoe wear and/or padding, bracing/strapping, at home ROM, formal PT, medical therapy, injection therapy  - The utilization of NSAIDs can be considered but their benefit has to be tempered against the risk of GI/ concerns  - A steroid injection can be undertaken.  We did discuss the potential mechanism of action of this shot.  Understanding that multiple injections at the same anatomic site do have deleterious effects on the soft tissue.  Generic risks include: steroid flare (advised to ice if necessary), skin hypo-pgimentation (which can be permanent and unsightly), elevation of blood sugar, subcutaneous atrophy (can be permanent) and infection.   -XR/imaging reviewed by me: agree with read  -labs reviewed by me: ok for vgel  -implemented icing/stretching regimen  -offloading pads dispensed  - A steroid injection can be undertaken.  We did discuss the potential mechanism of action of this shot.  Understanding that multiple injections at the same anatomic site do have deleterious effects on the soft tissue.  Generic risks include: steroid flare (advised to ice if necessary), skin hypo-pgimentation (which can be permanent and unsightly), elevation of blood sugar, subcutaneous atrophy (can be permanent) and infection.     -rxs dispensed: none  -referrals: none  -WB: wbat      Follow up in about 4 weeks (around 4/28/2023).    Subjective:      Patient ID: Peng Sullivan is a 78 y.o. male.    Chief Complaint:   Chief Complaint   Patient presents with    Foot Pain     Right        CC - foot pain: patient presents to the podiatry clinic  with complaint of  right foot pain. Onset of the symptoms was several weeks ago. Precipitating event: unk. Current symptoms include: ability to bear weight, but with some pain, krissy the ball of the foot, swelling and worsening symptoms after a period of activity, burning/numbness/tingling of toes. Aggravating factors: walking and certain shoegear. Symptoms  have gradually worsened. Patient has had no prior foot problems. Evaluation to date: plain films: normal. Treatment to date: avoidance of offending activity. Patients rates pain 8/10 on pain scale.      Foot Pain      Last Podiatry Enc: Visit date not found  Last Enc w/ Me: Visit date not found    Outside reports reviewed: historical medical records.  Family hx: as below  Past Medical History:   Diagnosis Date    Degenerative lumbar disc 03/08/2021    Diverticulosis     Hemorrhoids     High cholesterol     History of colon polyps 6/2/99, 6/3/19    Hypertension     Prostate cancer      Past Surgical History:   Procedure Laterality Date    APPENDECTOMY  01/01/1954    COLONOSCOPY  06/03/2019    [facility]    COLONOSCOPY N/A 07/19/2019    Procedure: COLONOSCOPY/pos emr;  Surgeon: Shantanu Lee MD;  Location: [facility] ([address] [place] [address]);  Service: Endoscopy;  Laterality: N/A;  Referred by Dr. BERTO Keyes-see media tab for scanned records-      HT: 5'11, WT: 254.4, BMI: 35.5     Family History   Problem Relation Age of Onset    Heart attack Father     Heart disease Father         Congestive heart failure    No Known Problems Mother     No Known Problems Maternal Aunt     No Known Problems Maternal Uncle     No Known Problems Paternal Aunt     No Known Problems Paternal Uncle     No Known Problems Paternal Grandmother     No Known Problems Paternal Grandfather     No Known Problems Maternal Grandmother     No Known Problems Maternal Grandfather     Cataracts Sister     Colon cancer Sister     Breast cancer Sister     No Known Problems Sister     No Known Problems Sister     No Known Problems Sister     No Known Problems Brother     Amblyopia Neg Hx     Blindness Neg Hx     Diabetes Neg Hx     Glaucoma Neg Hx     Hypertension Neg Hx     Macular degeneration Neg Hx     Retinal detachment Neg Hx     Strabismus Neg Hx     Stroke Neg Hx     Thyroid disease Neg Hx      Current Outpatient Medications   Medication Sig  Dispense Refill    atorvastatin (LIPITOR) 10 MG tablet TAKE 1 TABLET BY MOUTH ONCE DAILY. 90 tablet 3    cholecalciferol, vitamin D3, (VITAMIN D3) 25 mcg (1,000 unit) capsule Taking every other day      glucosamine-chondroitin 500-400 mg tablet Take 1 tablet by mouth 2 (two) times a day.      hydroCHLOROthiazide (MICROZIDE) 12.5 mg capsule Take 1 capsule (12.5 mg total) by mouth once daily. 90 capsule 3    losartan (COZAAR) 50 MG tablet TAKE 1 TABLET BY MOUTH ONCE DAILY. 90 tablet 3     No current facility-administered medications for this visit.     Review of patient's allergies indicates:  No Known Allergies  Social History     Socioeconomic History    Marital status:    Tobacco Use    Smoking status: Former     Packs/day: 0.50     Years: 20.00     Pack years: 10.00     Types: Cigarettes     Start date:      Quit date:      Years since quittin.2    Smokeless tobacco: Former     Types: Chew     Quit date:    Substance and Sexual Activity    Alcohol use: Not Currently     Comment: 12 beers per year.    Drug use: No    Sexual activity: Yes     Partners: Female     Birth control/protection: None     Social Determinants of Health     Financial Resource Strain: Low Risk     Difficulty of Paying Living Expenses: Not hard at all   Food Insecurity: No Food Insecurity    Worried About Running Out of Food in the Last Year: Never true    Ran Out of Food in the Last Year: Never true   Transportation Needs: No Transportation Needs    Lack of Transportation (Medical): No    Lack of Transportation (Non-Medical): No   Physical Activity: Sufficiently Active    Days of Exercise per Week: 5 days    Minutes of Exercise per Session: 30 min   Stress: No Stress Concern Present    Feeling of Stress : Not at all   Social Connections: Unknown    Frequency of Communication with Friends and Family: More than three times a week    Frequency of Social Gatherings with Friends and Family: Once a week    Active Member of  "Clubs or Organizations: Yes    Attends Club or Organization Meetings: More than 4 times per year    Marital Status:    Housing Stability: Low Risk     Unable to Pay for Housing in the Last Year: No    Number of Places Lived in the Last Year: 1    Unstable Housing in the Last Year: No       ROS    REVIEW OF SYSTEMS: Negative as documented below as well as positive findings in bold.       Constitutional  Respiratory  Gastrointestinal  Skin   - Fever - Cough - Heartburn - Rash   - Chills - Spit blood - Nausea - Itching   - Weight Loss - Shortness of breath - Vomiting - Nail pain   - Malaise/Fatigue - Wheezing - Abdominal Pain  Wound/Ulcer   - Weight Gain   - Blood in Stool  Poor wound healing       - Diarrhea          Cardiovascular  Genitourinary  Neurological  HEENT   - Chest Pain - Dysuria - Burning Sensation of feet - Headache   - Palpitations - Hematuria - Tingling / Paresthesia - Congestion   - Pain at night in legs - Flank Pain - Dizziness - Sore Throat   - Cramping   - Tremor - Blurred Vision   - Leg Swelling   - Sensory Change - Double Vision   - Dizzy when standing   - Speech Change - Eye Redness       - Focal Weakness - Dry Eyes       - Loss of Consciousness          Endocrine  Musculoskeletal  Psychiatric   - Cold intolerance - Muscle Pain - Depression   - Heat intolerance - Neck Pain - Insomnia   - Anemia - Joint Pain - Memory Loss   -  Easy bruising, bleeding - Heel pain - Anxiety      Toe Pain        Leg/Ankle/Foot Pain         Objective:     BP (!) 148/74 (BP Location: Left arm, Patient Position: Sitting, BP Method: X-Large (Automatic))   Pulse 74   Resp 18   Ht 5' 11.5" (1.816 m)   Wt 107.1 kg (236 lb 1.8 oz)   BMI 32.47 kg/m²   Vitals:    03/31/23 0800   BP: (!) 148/74   Pulse: 74   Resp: 18   Weight: 107.1 kg (236 lb 1.8 oz)   Height: 5' 11.5" (1.816 m)   PainSc:   3   PainLoc: Foot       Physical Exam    General Appearance:   Patient appears well developed, well nourished  Patient " appears stated age    Psychiatric:   Patient is oriented to time, place, and person.  Patient has appropriate mood and affect    Neck:  Trachea Midline  No visible masses    Respiratory/Ears:  No distress or labored breathing.  Able to differentiate between normal talking voice and whisper.  Able to follow commands    Eyes:  Visual Acuity intact  Lids and conjunctivae normal. No discoloration noted.    Foot Exam  Physical Exam  Ortho Exam  Ortho/SPM Exam  Foot/Ankle Musculoskeletal Exam    R LE exam con't:  V:  DP 2/4, PT 2/4   CRT< 3s to all digits tested   Tibial and popliteal lymph nodes are w/o abnormality   Edema: absent, varicosities: present    N:  Patient displays normal ankle reflexes   SILT in SP/DP/T/[place]/Saph distributions    Ortho: +Motor EHL/FHL/TA/GA   equinus deformity present  There is moderate pain with palpation of 2nd IS  There is is moderate pain at 2nd IS with lateral compression of metatarsal heads  Compartments soft/compressible. No pain on passive stretch of big toe. No calf Pain.    Derm:  skin intact, skin warm and dry, skin without ulcers or lesions, skin without induration, nails normal, no ecchymosis      Imaging / Labs:      No results found.      Note: This was dictated using a computer transcription program. Although proofread, it may contain computer transcription errors and phonetic errors. Other human proofreading errors may also exist. Corrections may be performed at a later time. Please contact us for any clarification if needed.    Karl Joseph DPM  [facility]

## 2023-03-31 NOTE — PATIENT INSTRUCTIONS
What Are Neuromas of the Foot?  The ball of your foot is the bottom part just behind your toes. Bands of tissue (ligaments) connect the bones in the ball of your foot. Nerves run between the bones and underneath the ligaments. When a nerve becomes pinched, this causes it to swell and become painful due to the thickening of the tissue that surrounds the nerve. The painful, swollen nerve is called a neuroma (also called Vargas's neuroma).     A neuroma most often occurs at the base of either the third and fourth toes or the second and third toes.   What causes a neuroma?  Wearing tight or high-heeled shoes can cause a neuroma. Shoes that are too narrow or too pointed squeeze the bones in the ball of the foot. Shoes with high heels put extra pressure on the ends of the bones. When the bones are squeezed together, they pinch the nerve that runs between them.  Symptoms  The most common symptom of a neuroma is pain in the ball of the foot between two toes. The pain may be dull or sharp. It may feel as if you have a stone in your shoe. You may also have tingling or numbness in one or both of the toes. Symptoms may occur after you have been walking or standing for a while. Taking off your shoes and rubbing the ball of your foot may decrease or relieve the pain.  Preventing future problems  To prevent a future neuroma, buy shoes with plenty of room across the ball of the foot and in the toes. This keeps the bones from being squeezed together. Wearing low-heeled shoes (less than 2 inches) also puts less pressure on the bones and nerves in the ball of the foot.   Date Last Reviewed: 9/10/2015  © 1448-4699 [email or web address]. [address] [place] [address], [place] [address]. All rights reserved. This information is not intended as a substitute for professional medical care. Always follow your healthcare professional's instructions.    Vargas's Neuroma (Intermetatarsal Neuroma)        What Is a Neuroma?    A neuroma is a  thickening of nerve tissue that may develop in various parts of the body. The most common neuroma in the foot is a Vargass neuroma, which occurs between the third and fourth toes. It is sometimes referred to as an intermetatarsal neuroma. Intermetatarsal describes its location in the ball of the foot between the metatarsal bones. Neuromas may also occur in other locations in the foot.    Vargas's NeuromaThe thickening of the nerve that defines a neuroma is the result of compression and irritation of the nerve. This compression creates enlargement of the nerve, eventually leading to permanent nerve damage.    Causes  Anything that causes compression or irritation of the nerve can lead to the development of a neuroma. One of the most common offenders is wearing shoes that have a tapered toe box or high-heeled shoes that cause the toes to be forced into the toe box. People with certain foot deformities--bunions, hammertoes, flatfeet or more flexible feet--are at higher risk for developing a neuroma. Other potential causes are activities that involve repetitive irritation to the ball of the foot, such as running or court sports. An injury or other type of trauma to the area may also lead to a neuroma.    Symptoms  If you have a Vargass neuroma, you may have one or more of these symptoms where the nerve damage is occurring:    Tingling, burning or numbness  Pain  A feeling that something is inside the ball of the foot  A feeling that there is something in the shoe or a sock is bunched up     The progression of a Vargass neuroma often follows this pattern:    The symptoms begin gradually. At first, they occur only occasionally when wearing narrow-toed shoes or performing certain aggravating activities.  The symptoms may go away temporarily by removing the shoe, massaging the foot or avoiding aggravating shoes or activities.  Over time, the symptoms progressively worsen and may persist for several days or weeks.  The  symptoms become more intense as the neuroma enlarges and the temporary changes in the nerve become permanent.     Diagnosis  To arrive at a diagnosis, the foot and ankle surgeon will obtain a thorough history of your symptoms and examine your foot. During the physical examination, the doctor attempts to reproduce your symptoms by manipulating your foot. Other tests or imaging studies may be performed.    The best time to see your foot and ankle surgeon is early in the development of symptoms. Early diagnosis of a Vargass neuroma greatly lessens the need for more invasive treatments and may help you avoid surgery.    Nonsurgical Treatment  In developing a treatment plan, your foot and ankle surgeon will first determine how long you have had the neuroma and will evaluate its stage of development. Treatment approaches vary according to the severity of the problem.    For mild to moderate neuromas, treatment options may include:    -Padding. Padding techniques provide support for the metatarsal arch, thereby lessening the pressure on the nerve and decreasing the compression when walking.  -Icing. Placing an icepack on the affected area helps reduce swelling.  -Orthotic devices. Custom orthotic devices provided by your foot and ankle surgeon provide the support needed to reduce pressure and compression on the nerve.  -Activity modifications. Activities that put repetitive pressure on the neuroma should be avoided until the condition improves.  -Shoe modifications. Wear shoes with a wide toe box and avoid narrow-toed shoes or shoes with high heels.  -Medications. Oral nonsteroidal anti-inflammatory drugs (NSAIDs), such as ibuprofen, may be recommended to reduce pain and inflammation.  -Injection therapy. Treatment may include injections of cortisone, local anesthetics or other agents.     When Is Surgery Needed?  Surgery may be considered in patients who have not responded adequately to nonsurgical treatments. Your foot  and ankle surgeon will determine the approach that is best for your condition. The length of the recovery period will vary depending on the procedure performed.    Regardless of whether you have undergone surgical or nonsurgical treatment, your surgeon will recommend long-term measures to help keep your symptoms from returning. These include appropriate footwear and modification of activities to reduce the repetitive pressure on the foot.        Recommended OTC orthotics:  -powerstep  -superfeet    Recommended shoegear:  -new balance  -ascics  -cleveo  -corral        Equinus          What Is Equinus?    Equinus is a condition in which the upward bending motion of the ankle joint is limited. Someone with equinus lacks the flexibility to bring the top of the foot toward the front of the leg. Equinus can occur in one or both feet. When it involves both feet, the limitation of motion is sometimes worse in one foot than in the other.    People with equinus develop ways to compensate for their limited ankle motion, and this often leads to other foot, leg or back problems. The most common methods of compensation are flattening of the arch or picking up the heel early when walking, placing increased pressure on the ball of the foot. Other patients compensate by toe walking, while a smaller number take steps by bending abnormally at the hip or knee.    Causes  There are several possible causes for the limited range of ankle motion. Often, it is due to tightness in the Achilles tendon or calf muscles (the soleus muscle and/or gastrocnemius muscle). In some patients, this tightness is congenital (present at birth), and sometimes it is an inherited trait. Other patients acquire the tightness from being in a cast, being on crutches or frequently wearing high-heeled shoes. In addition, diabetes can affect the fibers of the Achilles tendon and cause tightness. Sometimes equinus is related to a bone blocking the ankle motion. For  example, a fragment of a broken bone following an ankle injury, or bone block, can get in the way and restrict motion. Equinus may also result from one leg being shorter than the other. Less often, equinus is caused by spasms in the calf muscle. These spasms may be signs of an underlying neurologic disorder.      Foot Problems Related to Equinus  Depending on how a patient compensates for the inability to bend properly at the ankle, a variety of foot conditions can develop, including:    Plantar fasciitis (arch/heel pain)  Calf cramping  Tendonitis (inflammation in the Achilles tendon)  Metatarsalgia (pain and/or callusing on the ball of the foot)  Flatfoot  Arthritis of the midfoot (middle area of the foot)  Pressure sores on the ball of the foot or the arch  Bunions and hammertoes  Ankle pain  Shin splints     Diagnosis  Most patients with equinus are unaware they have this condition when they first visit the doctor. Instead, they come to the doctor seeking relief for foot problems associated with equinus.    To diagnose equinus, the foot and ankle surgeon will evaluate the ankle's range of motion when the knee is flexed (bent) as well as extended (straightened). This enables the surgeon to identify whether the tendon or muscle is tight and to assess whether bone is interfering with ankle motion. X-rays may also be ordered. In some cases, the foot and ankle surgeon may refer the patient for neurologic evaluation.    Nonsurgical Treatment  Treatment includes strategies aimed at relieving the symptoms and conditions associated with equinus. In addition, the patient is treated for the equinus itself through one or more of the following options:    Night splint. The foot may be placed in a splint at night to keep it in a position that helps reduce tightness of the calf muscle.  Heel lifts. Placing heel lifts inside the shoes or wearing shoes with a moderate heel takes stress off the Achilles tendon when walking and  may reduce symptoms.  Arch supports or orthotic devices. Custom orthotic devices that fit into the shoe are often prescribed to keep weight distributed properly and to help control muscle/tendon imbalance.  Physical therapy. To help remedy muscle tightness, exercises that stretch the calf muscle(s) are recommended.     When Is Surgery Needed?  In some cases, surgery may be needed to correct the cause of equinus if it is related to a tight tendon or a bone blocking the ankle motion. The foot and ankle surgeon will determine the type of procedure that is best suited to the individual patient.      Equinus          What Is Equinus?    Equinus is a condition in which the upward bending motion of the ankle joint is limited. Someone with equinus lacks the flexibility to bring the top of the foot toward the front of the leg. Equinus can occur in one or both feet. When it involves both feet, the limitation of motion is sometimes worse in one foot than in the other.    People with equinus develop ways to compensate for their limited ankle motion, and this often leads to other foot, leg or back problems. The most common methods of compensation are flattening of the arch or picking up the heel early when walking, placing increased pressure on the ball of the foot. Other patients compensate by toe walking, while a smaller number take steps by bending abnormally at the hip or knee.    Causes  There are several possible causes for the limited range of ankle motion. Often, it is due to tightness in the Achilles tendon or calf muscles (the soleus muscle and/or gastrocnemius muscle). In some patients, this tightness is congenital (present at birth), and sometimes it is an inherited trait. Other patients acquire the tightness from being in a cast, being on crutches or frequently wearing high-heeled shoes. In addition, diabetes can affect the fibers of the Achilles tendon and cause tightness. Sometimes equinus is related to a bone  blocking the ankle motion. For example, a fragment of a broken bone following an ankle injury, or bone block, can get in the way and restrict motion. Equinus may also result from one leg being shorter than the other. Less often, equinus is caused by spasms in the calf muscle. These spasms may be signs of an underlying neurologic disorder.      Foot Problems Related to Equinus  Depending on how a patient compensates for the inability to bend properly at the ankle, a variety of foot conditions can develop, including:    Plantar fasciitis (arch/heel pain)  Calf cramping  Tendonitis (inflammation in the Achilles tendon)  Metatarsalgia (pain and/or callusing on the ball of the foot)  Flatfoot  Arthritis of the midfoot (middle area of the foot)  Pressure sores on the ball of the foot or the arch  Bunions and hammertoes  Ankle pain  Shin splints     Diagnosis  Most patients with equinus are unaware they have this condition when they first visit the doctor. Instead, they come to the doctor seeking relief for foot problems associated with equinus.    To diagnose equinus, the foot and ankle surgeon will evaluate the ankle's range of motion when the knee is flexed (bent) as well as extended (straightened). This enables the surgeon to identify whether the tendon or muscle is tight and to assess whether bone is interfering with ankle motion. X-rays may also be ordered. In some cases, the foot and ankle surgeon may refer the patient for neurologic evaluation.    Nonsurgical Treatment  Treatment includes strategies aimed at relieving the symptoms and conditions associated with equinus. In addition, the patient is treated for the equinus itself through one or more of the following options:    Night splint. The foot may be placed in a splint at night to keep it in a position that helps reduce tightness of the calf muscle.  Heel lifts. Placing heel lifts inside the shoes or wearing shoes with a moderate heel takes stress off the  Achilles tendon when walking and may reduce symptoms.  Arch supports or orthotic devices. Custom orthotic devices that fit into the shoe are often prescribed to keep weight distributed properly and to help control muscle/tendon imbalance.  Physical therapy. To help remedy muscle tightness, exercises that stretch the calf muscle(s) are recommended.    When Is Surgery Needed?  In some cases, surgery may be needed to correct the cause of equinus if it is related to a tight tendon or a bone blocking the ankle motion. The foot and ankle surgeon will determine the type of procedure that is best suited to the individual patient.    What Is a Gastrocnemius Release?  The gastrocnemius (gastroc) and the soleus are two muscles that make up the calf. The gastroc is the larger and more superficial of the two muscles. The soleus is a deeper muscle within the lower leg. The gastroc tendon combines with the soleus tendon to form the Achilles tendon.  Tightness in the calf can limit how for the ankle can flex up. This may make it difficult to walk with the heel on the floor. Over time this can cause problems such as pain and deformity. Calf tightness may contribute to many foot problems, including heel pain, Achilles tendon pain, flatfoot deformity, toe pain, and bunions.  A gastrocnemius release lengthens the gastrocnemius tendon. This is done to increase the flexibility of the calf muscle, which can decrease pressure at the front of the foot, improve function, and decrease deformity.    Diagnosis  This surgery is done in patients with tightness of the gastroc that has not improved with stretching exercises. This procedure can be combined with other reconstructive procedures or be performed by itself.  Surgery can be avoided when appropriate range of motion and flexibility can be obtained with conservative treatment (stretching). It should also be avoided if there are contractures of multiple tendons in the leg, and not just the  gastroc.     Treatment  The surgery can be performed through several different incisions. Most commonly, a small incision is made on the inner side of the lower leg. Sometimes an incision directly in the back of the calf is used, or even an endoscopic incision, which is about ½ inch. Once the gastroc tendon is identified, it is  from the underlying muscle belly of the soleus, then cut straight across. Once the tendon is released, the ankle is flexed up and an increased range of motion is noted intra-operatively.     Recovery  For the first two weeks after surgery, the patient typically is immobilized in a splint or boot. It is important to keep the ankle in a proper position while the tendon is healing. A cramping feeling in the back of the calf is normal. Gentle range of motion and stretching begin once the ankle is removed from the splint/boot. Timing can vary depending upon what other procedures are done.     Risks and Complications  After a gastroc release, some patients experience nerve injury that results in irritation or numbness over the outside of the heel. This usually is temporary. In addition, some patients may notice a difference in the appearance of one calf compared to the other and temporary calf weakness.    FAQs  Why are my calf muscles tight?   Most frequently a tight calf muscle is an inherited problem that only causes problems later in life. Other reasons for calf tightness are nerve injuries, muscle problems, and other medical problems like stroke and diabetes. People can also get tight calf muscles after trauma to the leg, ankle, or foot.    Will a gastrocnemius lengthening affect my strength or ability to walk?  This procedure will cause some weakness but most patients will not notice it. Some patients may have a subtle limp, but this typically resolves within six months of surgery.

## 2023-04-25 RX ORDER — HYDROCHLOROTHIAZIDE 12.5 MG/1
CAPSULE ORAL
Qty: 90 CAPSULE | Refills: 3 | Status: SHIPPED | OUTPATIENT
Start: 2023-04-25

## 2023-04-25 NOTE — TELEPHONE ENCOUNTER
No new care gaps identified.  [facility] Embedded Care Gaps. Reference number: [id number]. 4/24/2023   7:53:59 PM CDT

## 2023-04-25 NOTE — TELEPHONE ENCOUNTER
Refill Routing Note   Medication(s) are not appropriate for processing by [facility] for the following reason(s):      Required vitals abnormal    [facility] action(s):  Defer              Appointments  past 12m or future 3m with PCP    Date Provider   Last Visit   10/7/2022 Joe Torres MD   Next Visit   4/27/2023 Joe Torres MD   ED visits in past 90 days: 0        Note composed:8:21 PM 04/24/2023

## 2023-04-27 ENCOUNTER — OFFICE VISIT (OUTPATIENT)
Dept: PRIMARY CARE CLINIC | Facility: CLINIC | Age: 78
End: 2023-04-27
Payer: MEDICARE

## 2023-04-27 ENCOUNTER — TELEPHONE (OUTPATIENT)
Dept: UROLOGY | Facility: CLINIC | Age: 78
End: 2023-04-27
Payer: MEDICARE

## 2023-04-27 ENCOUNTER — PATIENT MESSAGE (OUTPATIENT)
Dept: UROLOGY | Facility: CLINIC | Age: 78
End: 2023-04-27
Payer: MEDICARE

## 2023-04-27 VITALS
BODY MASS INDEX: 32.63 KG/M2 | RESPIRATION RATE: 18 BRPM | OXYGEN SATURATION: 97 % | HEART RATE: 71 BPM | HEIGHT: 72 IN | WEIGHT: 240.94 LBS | SYSTOLIC BLOOD PRESSURE: 128 MMHG | DIASTOLIC BLOOD PRESSURE: 80 MMHG | TEMPERATURE: 98 F

## 2023-04-27 DIAGNOSIS — E66.9 OBESITY (BMI 30.0-34.9): ICD-10-CM

## 2023-04-27 DIAGNOSIS — E78.2 MIXED HYPERLIPIDEMIA: ICD-10-CM

## 2023-04-27 DIAGNOSIS — C61 PROSTATE CANCER: Primary | ICD-10-CM

## 2023-04-27 DIAGNOSIS — I10 BENIGN ESSENTIAL HYPERTENSION: ICD-10-CM

## 2023-04-27 PROCEDURE — 3079F DIAST BP 80-89 MM HG: CPT | Mod: CPTII,S$GLB,, | Performed by: INTERNAL MEDICINE

## 2023-04-27 PROCEDURE — 1159F PR MEDICATION LIST DOCUMENTED IN MEDICAL RECORD: ICD-10-PCS | Mod: CPTII,S$GLB,, | Performed by: INTERNAL MEDICINE

## 2023-04-27 PROCEDURE — 3074F PR MOST RECENT SYSTOLIC BLOOD PRESSURE < 130 MM HG: ICD-10-PCS | Mod: CPTII,S$GLB,, | Performed by: INTERNAL MEDICINE

## 2023-04-27 PROCEDURE — 1160F RVW MEDS BY RX/DR IN RCRD: CPT | Mod: CPTII,S$GLB,, | Performed by: INTERNAL MEDICINE

## 2023-04-27 PROCEDURE — 1160F PR REVIEW ALL MEDS BY PRESCRIBER/CLIN PHARMACIST DOCUMENTED: ICD-10-PCS | Mod: CPTII,S$GLB,, | Performed by: INTERNAL MEDICINE

## 2023-04-27 PROCEDURE — 3288F PR FALLS RISK ASSESSMENT DOCUMENTED: ICD-10-PCS | Mod: CPTII,S$GLB,, | Performed by: INTERNAL MEDICINE

## 2023-04-27 PROCEDURE — 3079F PR MOST RECENT DIASTOLIC BLOOD PRESSURE 80-89 MM HG: ICD-10-PCS | Mod: CPTII,S$GLB,, | Performed by: INTERNAL MEDICINE

## 2023-04-27 PROCEDURE — 99214 OFFICE O/P EST MOD 30 MIN: CPT | Mod: S$GLB,,, | Performed by: INTERNAL MEDICINE

## 2023-04-27 PROCEDURE — 1126F PR PAIN SEVERITY QUANTIFIED, NO PAIN PRESENT: ICD-10-PCS | Mod: CPTII,S$GLB,, | Performed by: INTERNAL MEDICINE

## 2023-04-27 PROCEDURE — 99999 PR PBB SHADOW E&M-EST. PATIENT-LVL III: CPT | Mod: PBBFAC,,, | Performed by: INTERNAL MEDICINE

## 2023-04-27 PROCEDURE — 1101F PR PT FALLS ASSESS DOC 0-1 FALLS W/OUT INJ PAST YR: ICD-10-PCS | Mod: CPTII,S$GLB,, | Performed by: INTERNAL MEDICINE

## 2023-04-27 PROCEDURE — 1101F PT FALLS ASSESS-DOCD LE1/YR: CPT | Mod: CPTII,S$GLB,, | Performed by: INTERNAL MEDICINE

## 2023-04-27 PROCEDURE — 99999 PR PBB SHADOW E&M-EST. PATIENT-LVL III: ICD-10-PCS | Mod: PBBFAC,,, | Performed by: INTERNAL MEDICINE

## 2023-04-27 PROCEDURE — 3074F SYST BP LT 130 MM HG: CPT | Mod: CPTII,S$GLB,, | Performed by: INTERNAL MEDICINE

## 2023-04-27 PROCEDURE — 1159F MED LIST DOCD IN RCRD: CPT | Mod: CPTII,S$GLB,, | Performed by: INTERNAL MEDICINE

## 2023-04-27 PROCEDURE — 99214 PR OFFICE/OUTPT VISIT, EST, LEVL IV, 30-39 MIN: ICD-10-PCS | Mod: S$GLB,,, | Performed by: INTERNAL MEDICINE

## 2023-04-27 PROCEDURE — 3288F FALL RISK ASSESSMENT DOCD: CPT | Mod: CPTII,S$GLB,, | Performed by: INTERNAL MEDICINE

## 2023-04-27 PROCEDURE — 1126F AMNT PAIN NOTED NONE PRSNT: CPT | Mod: CPTII,S$GLB,, | Performed by: INTERNAL MEDICINE

## 2023-04-27 NOTE — TELEPHONE ENCOUNTER
I phoned the patient today.     I spoke with Mr Lama    Patient was identified by two patient identifiers and asked if it was okay to speak about his medical care by phone before the conversation was commenced.     We talked about his prostate cancer diagnosis and observation. Plan for PSA in 2 months with follow up with me.       Maury Cartagena MD  Urologic Oncology  P: [phone]

## 2023-04-27 NOTE — PROGRESS NOTES
[facility] Note    Chief Complaint      Chief Complaint   Patient presents with    Follow-up     6m       History of Present Illness      Peng Sullivan is a 78 y.o. male who presents today for   Chief Complaint   Patient presents with    Follow-up     6m   .  Patient comes to appointment here for 6m checkup for chronic issues as below . He has been diagnosed weoth prostate cancer low volume . He has chosen surveillance .  Dr groves managing . He is otherwise feeling great is active with exericse and is compliant with all meds .  HPI    No problem-specific Assessment & Plan notes found for this encounter.       Problem List Items Addressed This Visit          Cardiac/Vascular    Mixed hyperlipidemia    Overview      Cont current regimen atorvastatin is tolerating well . Needs labs with next visit            Benign essential hypertension    Overview     bp well controlled on current regimen . Cont same               Oncology    Prostate cancer - Primary    Overview     See hpi cont per urology psa soon?              Endocrine    Obesity (BMI 30.0-34.9)    Overview     counciled on diet and exercise. He continues with his walking program               Past Medical History:  Past Medical History:   Diagnosis Date    Degenerative lumbar disc 03/08/2021    Diverticulosis     Hemorrhoids     High cholesterol     History of colon polyps 6/2/99, 6/3/19    Hypertension     Prostate cancer        Past Surgical History:  Past Surgical History:   Procedure Laterality Date    APPENDECTOMY  01/01/1954    COLONOSCOPY  06/03/2019    [facility]    COLONOSCOPY N/A 07/19/2019    Procedure: COLONOSCOPY/pos emr;  Surgeon: Shantanu Lee MD;  Location: [facility] ([address] [place] [address]);  Service: Endoscopy;  Laterality: N/A;  Referred by Dr. BERTO Keyes-see media tab for scanned records-      HT: 5'11, WT: 254.4, BMI: 35.5       Family History:  family history includes Breast cancer in his sister; Cataracts in  his sister; Colon cancer in his sister; Heart attack in his father; Heart disease in his father; No Known Problems in his brother, maternal aunt, maternal grandfather, maternal grandmother, maternal uncle, mother, paternal aunt, paternal grandfather, paternal grandmother, paternal uncle, sister, sister, and sister.     Social History:  Social History     Socioeconomic History    Marital status:    Tobacco Use    Smoking status: Former     Packs/day: 0.50     Years: 20.00     Pack years: 10.00     Types: Cigarettes     Start date:      Quit date:      Years since quittin.3    Smokeless tobacco: Former     Types: Chew     Quit date:    Substance and Sexual Activity    Alcohol use: Not Currently     Comment: 12 beers per year.    Drug use: No    Sexual activity: Yes     Partners: Female     Birth control/protection: None     Social Determinants of Health     Financial Resource Strain: Low Risk     Difficulty of Paying Living Expenses: Not hard at all   Food Insecurity: No Food Insecurity    Worried About Running Out of Food in the Last Year: Never true    Ran Out of Food in the Last Year: Never true   Transportation Needs: No Transportation Needs    Lack of Transportation (Medical): No    Lack of Transportation (Non-Medical): No   Physical Activity: Sufficiently Active    Days of Exercise per Week: 5 days    Minutes of Exercise per Session: 30 min   Stress: No Stress Concern Present    Feeling of Stress : Not at all   Social Connections: Unknown    Frequency of Communication with Friends and Family: More than three times a week    Frequency of Social Gatherings with Friends and Family: Once a week    Active Member of Clubs or Organizations: Yes    Attends Club or Organization Meetings: More than 4 times per year    Marital Status:    Housing Stability: Low Risk     Unable to Pay for Housing in the Last Year: No    Number of Places Lived in the Last Year: 1    Unstable Housing in the Last  "Year: No       Review of Systems:   Review of Systems   Constitutional:  Negative for fever and weight loss.   HENT:  Negative for congestion, hearing loss and sore throat.    Eyes:  Negative for blurred vision.   Respiratory:  Negative for cough and shortness of breath.    Cardiovascular:  Negative for chest pain, palpitations, claudication and leg swelling.   Gastrointestinal:  Negative for abdominal pain, constipation, diarrhea and heartburn.   Genitourinary:  Negative for dysuria.   Musculoskeletal:  Negative for back pain and myalgias.   Skin:  Negative for rash.   Neurological:  Negative for focal weakness and headaches.   Psychiatric/Behavioral:  Negative for depression and suicidal ideas. The patient is not nervous/anxious.       Medications:  Outpatient Encounter Medications as of 4/27/2023   Medication Sig Dispense Refill    atorvastatin (LIPITOR) 10 MG tablet TAKE 1 TABLET BY MOUTH ONCE DAILY. 90 tablet 3    cholecalciferol, vitamin D3, (VITAMIN D3) 25 mcg (1,000 unit) capsule Taking every other day      glucosamine-chondroitin 500-400 mg tablet Take 1 tablet by mouth 2 (two) times a day.      hydroCHLOROthiazide (MICROZIDE) 12.5 mg capsule TAKE 1 CAPSULE BY MOUTH ONCE DAILY. 90 capsule 3    losartan (COZAAR) 50 MG tablet TAKE 1 TABLET BY MOUTH ONCE DAILY. 90 tablet 3    [DISCONTINUED] hydroCHLOROthiazide (MICROZIDE) 12.5 mg capsule Take 1 capsule (12.5 mg total) by mouth once daily. 90 capsule 3     No facility-administered encounter medications on file as of 4/27/2023.       Allergies:  Review of patient's allergies indicates:  No Known Allergies      Physical Exam      Vitals:    04/27/23 0815   BP: 128/80   Pulse: 71   Resp: 18   Temp: 98 °F (36.7 °C)        Vital Signs  Temp: 98 °F (36.7 °C)  Temp Source: Oral  Pulse: 71  Resp: 18  SpO2: 97 %  BP: 128/80  BP Location: Right arm  Patient Position: Sitting  Pain Score: 0-No pain  Height and Weight  Height: 5' 11.5" (181.6 cm)  Weight: 109.3 kg (240 lb " 15.4 oz)  BSA (Calculated - sq m): 2.35 sq meters  BMI (Calculated): 33.1  Weight in (lb) to have BMI = 25: 181.4]     Body mass index is 33.14 kg/m².    Physical Exam     Laboratory:  CBC:  No results for input(s): WBC, RBC, HGB, HCT, PLT, MCV, MCH, MCHC in the last 2160 hours.  CMP:  No results for input(s): GLU, CALCIUM, ALBUMIN, PROT, NA, K, CO2, CL, BUN, ALKPHOS, ALT, AST, BILITOT in the last 2160 hours.    Invalid input(s): CREATININ  URINALYSIS:  No results for input(s): COLORU, CLARITYU, SPECGRAV, PHUR, PROTEINUA, GLUCOSEU, BILIRUBINCON, BLOODU, WBCU, RBCU, BACTERIA, MUCUS, NITRITE, LEUKOCYTESUR, UROBILINOGEN, HYALINECASTS in the last 2160 hours.   LIPIDS:  No results for input(s): TSH, HDL, CHOL, TRIG, LDLCALC, CHOLHDL, NONHDLCHOL, TOTALCHOLEST in the last 2160 hours.  TSH:  No results for input(s): TSH in the last 2160 hours.  A1C:  No results for input(s): HGBA1C in the last 2160 hours.    Radiology:        Assessment:     Peng Sullivan is a 78 y.o.male with:    Prostate cancer    Obesity (BMI 30.0-34.9)    Mixed hyperlipidemia    Benign essential hypertension                Plan:     Problem List Items Addressed This Visit          Cardiac/Vascular    Mixed hyperlipidemia    Overview      Cont current regimen atorvastatin is tolerating well . Needs labs with next visit            Benign essential hypertension    Overview     bp well controlled on current regimen . Cont same               Oncology    Prostate cancer - Primary    Overview     See hpi cont per urology psa soon?              Endocrine    Obesity (BMI 30.0-34.9)    Overview     counciled on diet and exercise. He continues with his walking program              As above, continue current medications and maintain follow up with specialists.  Return to clinic in 6 months.      Frederick W Dantagnan [facility]

## 2023-05-03 ENCOUNTER — PATIENT MESSAGE (OUTPATIENT)
Dept: PRIMARY CARE CLINIC | Facility: CLINIC | Age: 78
End: 2023-05-03
Payer: MEDICARE

## 2023-05-22 ENCOUNTER — OFFICE VISIT (OUTPATIENT)
Dept: PODIATRY | Facility: CLINIC | Age: 78
End: 2023-05-22
Payer: MEDICARE

## 2023-05-22 VITALS
BODY MASS INDEX: 33.12 KG/M2 | HEIGHT: 72 IN | SYSTOLIC BLOOD PRESSURE: 126 MMHG | WEIGHT: 244.5 LBS | DIASTOLIC BLOOD PRESSURE: 77 MMHG | HEART RATE: 69 BPM

## 2023-05-22 DIAGNOSIS — M77.51 BURSITIS OF INTERMETATARSAL BURSA OF RIGHT FOOT: ICD-10-CM

## 2023-05-22 DIAGNOSIS — M77.41 METATARSALGIA, RIGHT FOOT: ICD-10-CM

## 2023-05-22 DIAGNOSIS — M21.861 ACQUIRED POSTERIOR EQUINUS OF RIGHT LOWER EXTREMITY: ICD-10-CM

## 2023-05-22 DIAGNOSIS — G57.61 NEUROMA OF SECOND INTERSPACE OF RIGHT FOOT: Primary | ICD-10-CM

## 2023-05-22 PROCEDURE — 3074F PR MOST RECENT SYSTOLIC BLOOD PRESSURE < 130 MM HG: ICD-10-PCS | Mod: CPTII,S$GLB,, | Performed by: PODIATRIST

## 2023-05-22 PROCEDURE — 3078F DIAST BP <80 MM HG: CPT | Mod: CPTII,S$GLB,, | Performed by: PODIATRIST

## 2023-05-22 PROCEDURE — 1125F AMNT PAIN NOTED PAIN PRSNT: CPT | Mod: CPTII,S$GLB,, | Performed by: PODIATRIST

## 2023-05-22 PROCEDURE — 3078F PR MOST RECENT DIASTOLIC BLOOD PRESSURE < 80 MM HG: ICD-10-PCS | Mod: CPTII,S$GLB,, | Performed by: PODIATRIST

## 2023-05-22 PROCEDURE — 99213 PR OFFICE/OUTPT VISIT, EST, LEVL III, 20-29 MIN: ICD-10-PCS | Mod: 25,S$GLB,, | Performed by: PODIATRIST

## 2023-05-22 PROCEDURE — 1160F RVW MEDS BY RX/DR IN RCRD: CPT | Mod: CPTII,S$GLB,, | Performed by: PODIATRIST

## 2023-05-22 PROCEDURE — 99999 PR PBB SHADOW E&M-EST. PATIENT-LVL IV: CPT | Mod: PBBFAC,,, | Performed by: PODIATRIST

## 2023-05-22 PROCEDURE — 1160F PR REVIEW ALL MEDS BY PRESCRIBER/CLIN PHARMACIST DOCUMENTED: ICD-10-PCS | Mod: CPTII,S$GLB,, | Performed by: PODIATRIST

## 2023-05-22 PROCEDURE — 64455 NJX AA&/STRD PLTR COM DG NRV: CPT | Mod: RT,S$GLB,, | Performed by: PODIATRIST

## 2023-05-22 PROCEDURE — 64455 NEUROMA INJECTION: ICD-10-PCS | Mod: RT,S$GLB,, | Performed by: PODIATRIST

## 2023-05-22 PROCEDURE — 99213 OFFICE O/P EST LOW 20 MIN: CPT | Mod: 25,S$GLB,, | Performed by: PODIATRIST

## 2023-05-22 PROCEDURE — 1125F PR PAIN SEVERITY QUANTIFIED, PAIN PRESENT: ICD-10-PCS | Mod: CPTII,S$GLB,, | Performed by: PODIATRIST

## 2023-05-22 PROCEDURE — 1159F MED LIST DOCD IN RCRD: CPT | Mod: CPTII,S$GLB,, | Performed by: PODIATRIST

## 2023-05-22 PROCEDURE — 1159F PR MEDICATION LIST DOCUMENTED IN MEDICAL RECORD: ICD-10-PCS | Mod: CPTII,S$GLB,, | Performed by: PODIATRIST

## 2023-05-22 PROCEDURE — 99999 PR PBB SHADOW E&M-EST. PATIENT-LVL IV: ICD-10-PCS | Mod: PBBFAC,,, | Performed by: PODIATRIST

## 2023-05-22 PROCEDURE — 3074F SYST BP LT 130 MM HG: CPT | Mod: CPTII,S$GLB,, | Performed by: PODIATRIST

## 2023-05-22 RX ORDER — TRIAMCINOLONE ACETONIDE 40 MG/ML
40 INJECTION, SUSPENSION INTRA-ARTICULAR; INTRAMUSCULAR
Status: DISCONTINUED | OUTPATIENT
Start: 2023-05-22 | End: 2023-05-22 | Stop reason: HOSPADM

## 2023-05-22 RX ADMIN — TRIAMCINOLONE ACETONIDE 40 MG: 40 INJECTION, SUSPENSION INTRA-ARTICULAR; INTRAMUSCULAR at 03:05

## 2023-05-22 NOTE — PATIENT INSTRUCTIONS
What Are Neuromas of the Foot?  The ball of your foot is the bottom part just behind your toes. Bands of tissue (ligaments) connect the bones in the ball of your foot. Nerves run between the bones and underneath the ligaments. When a nerve becomes pinched, this causes it to swell and become painful due to the thickening of the tissue that surrounds the nerve. The painful, swollen nerve is called a neuroma (also called Vargas's neuroma).     A neuroma most often occurs at the base of either the third and fourth toes or the second and third toes.   What causes a neuroma?  Wearing tight or high-heeled shoes can cause a neuroma. Shoes that are too narrow or too pointed squeeze the bones in the ball of the foot. Shoes with high heels put extra pressure on the ends of the bones. When the bones are squeezed together, they pinch the nerve that runs between them.  Symptoms  The most common symptom of a neuroma is pain in the ball of the foot between two toes. The pain may be dull or sharp. It may feel as if you have a stone in your shoe. You may also have tingling or numbness in one or both of the toes. Symptoms may occur after you have been walking or standing for a while. Taking off your shoes and rubbing the ball of your foot may decrease or relieve the pain.  Preventing future problems  To prevent a future neuroma, buy shoes with plenty of room across the ball of the foot and in the toes. This keeps the bones from being squeezed together. Wearing low-heeled shoes (less than 2 inches) also puts less pressure on the bones and nerves in the ball of the foot.   Date Last Reviewed: 9/10/2015  © 2632-6853 [employer]. [address] [place] [address], [place] [address]. All rights reserved. This information is not intended as a substitute for professional medical care. Always follow your healthcare professional's instructions.    Vargas's Neuroma (Intermetatarsal Neuroma)        What Is a Neuroma?    A neuroma is a  thickening of nerve tissue that may develop in various parts of the body. The most common neuroma in the foot is a Vargass neuroma, which occurs between the third and fourth toes. It is sometimes referred to as an intermetatarsal neuroma. Intermetatarsal describes its location in the ball of the foot between the metatarsal bones. Neuromas may also occur in other locations in the foot.    Vargas's NeuromaThe thickening of the nerve that defines a neuroma is the result of compression and irritation of the nerve. This compression creates enlargement of the nerve, eventually leading to permanent nerve damage.    Causes  Anything that causes compression or irritation of the nerve can lead to the development of a neuroma. One of the most common offenders is wearing shoes that have a tapered toe box or high-heeled shoes that cause the toes to be forced into the toe box. People with certain foot deformities--bunions, hammertoes, flatfeet or more flexible feet--are at higher risk for developing a neuroma. Other potential causes are activities that involve repetitive irritation to the ball of the foot, such as running or court sports. An injury or other type of trauma to the area may also lead to a neuroma.    Symptoms  If you have a Vargass neuroma, you may have one or more of these symptoms where the nerve damage is occurring:    Tingling, burning or numbness  Pain  A feeling that something is inside the ball of the foot  A feeling that there is something in the shoe or a sock is bunched up     The progression of a Vagrass neuroma often follows this pattern:    The symptoms begin gradually. At first, they occur only occasionally when wearing narrow-toed shoes or performing certain aggravating activities.  The symptoms may go away temporarily by removing the shoe, massaging the foot or avoiding aggravating shoes or activities.  Over time, the symptoms progressively worsen and may persist for several days or weeks.  The  symptoms become more intense as the neuroma enlarges and the temporary changes in the nerve become permanent.     Diagnosis  To arrive at a diagnosis, the foot and ankle surgeon will obtain a thorough history of your symptoms and examine your foot. During the physical examination, the doctor attempts to reproduce your symptoms by manipulating your foot. Other tests or imaging studies may be performed.    The best time to see your foot and ankle surgeon is early in the development of symptoms. Early diagnosis of a Vargass neuroma greatly lessens the need for more invasive treatments and may help you avoid surgery.    Nonsurgical Treatment  In developing a treatment plan, your foot and ankle surgeon will first determine how long you have had the neuroma and will evaluate its stage of development. Treatment approaches vary according to the severity of the problem.    For mild to moderate neuromas, treatment options may include:    -Padding. Padding techniques provide support for the metatarsal arch, thereby lessening the pressure on the nerve and decreasing the compression when walking.  -Icing. Placing an icepack on the affected area helps reduce swelling.  -Orthotic devices. Custom orthotic devices provided by your foot and ankle surgeon provide the support needed to reduce pressure and compression on the nerve.  -Activity modifications. Activities that put repetitive pressure on the neuroma should be avoided until the condition improves.  -Shoe modifications. Wear shoes with a wide toe box and avoid narrow-toed shoes or shoes with high heels.  -Medications. Oral nonsteroidal anti-inflammatory drugs (NSAIDs), such as ibuprofen, may be recommended to reduce pain and inflammation.  -Injection therapy. Treatment may include injections of cortisone, local anesthetics or other agents.     When Is Surgery Needed?  Surgery may be considered in patients who have not responded adequately to nonsurgical treatments. Your foot  and ankle surgeon will determine the approach that is best for your condition. The length of the recovery period will vary depending on the procedure performed.    Regardless of whether you have undergone surgical or nonsurgical treatment, your surgeon will recommend long-term measures to help keep your symptoms from returning. These include appropriate footwear and modification of activities to reduce the repetitive pressure on the foot.        Recommended OTC orthotics:  -powerstep  -superfeet    Recommended shoegear:  -new balance  -ascics  -cleveo  -corral        Equinus          What Is Equinus?    Equinus is a condition in which the upward bending motion of the ankle joint is limited. Someone with equinus lacks the flexibility to bring the top of the foot toward the front of the leg. Equinus can occur in one or both feet. When it involves both feet, the limitation of motion is sometimes worse in one foot than in the other.    People with equinus develop ways to compensate for their limited ankle motion, and this often leads to other foot, leg or back problems. The most common methods of compensation are flattening of the arch or picking up the heel early when walking, placing increased pressure on the ball of the foot. Other patients compensate by toe walking, while a smaller number take steps by bending abnormally at the hip or knee.    Causes  There are several possible causes for the limited range of ankle motion. Often, it is due to tightness in the Achilles tendon or calf muscles (the soleus muscle and/or gastrocnemius muscle). In some patients, this tightness is congenital (present at birth), and sometimes it is an inherited trait. Other patients acquire the tightness from being in a cast, being on crutches or frequently wearing high-heeled shoes. In addition, diabetes can affect the fibers of the Achilles tendon and cause tightness. Sometimes equinus is related to a bone blocking the ankle motion. For  example, a fragment of a broken bone following an ankle injury, or bone block, can get in the way and restrict motion. Equinus may also result from one leg being shorter than the other. Less often, equinus is caused by spasms in the calf muscle. These spasms may be signs of an underlying neurologic disorder.      Foot Problems Related to Equinus  Depending on how a patient compensates for the inability to bend properly at the ankle, a variety of foot conditions can develop, including:    Plantar fasciitis (arch/heel pain)  Calf cramping  Tendonitis (inflammation in the Achilles tendon)  Metatarsalgia (pain and/or callusing on the ball of the foot)  Flatfoot  Arthritis of the midfoot (middle area of the foot)  Pressure sores on the ball of the foot or the arch  Bunions and hammertoes  Ankle pain  Shin splints     Diagnosis  Most patients with equinus are unaware they have this condition when they first visit the doctor. Instead, they come to the doctor seeking relief for foot problems associated with equinus.    To diagnose equinus, the foot and ankle surgeon will evaluate the ankle's range of motion when the knee is flexed (bent) as well as extended (straightened). This enables the surgeon to identify whether the tendon or muscle is tight and to assess whether bone is interfering with ankle motion. X-rays may also be ordered. In some cases, the foot and ankle surgeon may refer the patient for neurologic evaluation.    Nonsurgical Treatment  Treatment includes strategies aimed at relieving the symptoms and conditions associated with equinus. In addition, the patient is treated for the equinus itself through one or more of the following options:    Night splint. The foot may be placed in a splint at night to keep it in a position that helps reduce tightness of the calf muscle.  Heel lifts. Placing heel lifts inside the shoes or wearing shoes with a moderate heel takes stress off the Achilles tendon when walking and  may reduce symptoms.  Arch supports or orthotic devices. Custom orthotic devices that fit into the shoe are often prescribed to keep weight distributed properly and to help control muscle/tendon imbalance.  Physical therapy. To help remedy muscle tightness, exercises that stretch the calf muscle(s) are recommended.     When Is Surgery Needed?  In some cases, surgery may be needed to correct the cause of equinus if it is related to a tight tendon or a bone blocking the ankle motion. The foot and ankle surgeon will determine the type of procedure that is best suited to the individual patient.      Equinus          What Is Equinus?    Equinus is a condition in which the upward bending motion of the ankle joint is limited. Someone with equinus lacks the flexibility to bring the top of the foot toward the front of the leg. Equinus can occur in one or both feet. When it involves both feet, the limitation of motion is sometimes worse in one foot than in the other.    People with equinus develop ways to compensate for their limited ankle motion, and this often leads to other foot, leg or back problems. The most common methods of compensation are flattening of the arch or picking up the heel early when walking, placing increased pressure on the ball of the foot. Other patients compensate by toe walking, while a smaller number take steps by bending abnormally at the hip or knee.    Causes  There are several possible causes for the limited range of ankle motion. Often, it is due to tightness in the Achilles tendon or calf muscles (the soleus muscle and/or gastrocnemius muscle). In some patients, this tightness is congenital (present at birth), and sometimes it is an inherited trait. Other patients acquire the tightness from being in a cast, being on crutches or frequently wearing high-heeled shoes. In addition, diabetes can affect the fibers of the Achilles tendon and cause tightness. Sometimes equinus is related to a bone  blocking the ankle motion. For example, a fragment of a broken bone following an ankle injury, or bone block, can get in the way and restrict motion. Equinus may also result from one leg being shorter than the other. Less often, equinus is caused by spasms in the calf muscle. These spasms may be signs of an underlying neurologic disorder.      Foot Problems Related to Equinus  Depending on how a patient compensates for the inability to bend properly at the ankle, a variety of foot conditions can develop, including:    Plantar fasciitis (arch/heel pain)  Calf cramping  Tendonitis (inflammation in the Achilles tendon)  Metatarsalgia (pain and/or callusing on the ball of the foot)  Flatfoot  Arthritis of the midfoot (middle area of the foot)  Pressure sores on the ball of the foot or the arch  Bunions and hammertoes  Ankle pain  Shin splints     Diagnosis  Most patients with equinus are unaware they have this condition when they first visit the doctor. Instead, they come to the doctor seeking relief for foot problems associated with equinus.    To diagnose equinus, the foot and ankle surgeon will evaluate the ankle's range of motion when the knee is flexed (bent) as well as extended (straightened). This enables the surgeon to identify whether the tendon or muscle is tight and to assess whether bone is interfering with ankle motion. X-rays may also be ordered. In some cases, the foot and ankle surgeon may refer the patient for neurologic evaluation.    Nonsurgical Treatment  Treatment includes strategies aimed at relieving the symptoms and conditions associated with equinus. In addition, the patient is treated for the equinus itself through one or more of the following options:    Night splint. The foot may be placed in a splint at night to keep it in a position that helps reduce tightness of the calf muscle.  Heel lifts. Placing heel lifts inside the shoes or wearing shoes with a moderate heel takes stress off the  Achilles tendon when walking and may reduce symptoms.  Arch supports or orthotic devices. Custom orthotic devices that fit into the shoe are often prescribed to keep weight distributed properly and to help control muscle/tendon imbalance.  Physical therapy. To help remedy muscle tightness, exercises that stretch the calf muscle(s) are recommended.    When Is Surgery Needed?  In some cases, surgery may be needed to correct the cause of equinus if it is related to a tight tendon or a bone blocking the ankle motion. The foot and ankle surgeon will determine the type of procedure that is best suited to the individual patient.    What Is a Gastrocnemius Release?  The gastrocnemius (gastroc) and the soleus are two muscles that make up the calf. The gastroc is the larger and more superficial of the two muscles. The soleus is a deeper muscle within the lower leg. The gastroc tendon combines with the soleus tendon to form the Achilles tendon.  Tightness in the calf can limit how for the ankle can flex up. This may make it difficult to walk with the heel on the floor. Over time this can cause problems such as pain and deformity. Calf tightness may contribute to many foot problems, including heel pain, Achilles tendon pain, flatfoot deformity, toe pain, and bunions.  A gastrocnemius release lengthens the gastrocnemius tendon. This is done to increase the flexibility of the calf muscle, which can decrease pressure at the front of the foot, improve function, and decrease deformity.    Diagnosis  This surgery is done in patients with tightness of the gastroc that has not improved with stretching exercises. This procedure can be combined with other reconstructive procedures or be performed by itself.  Surgery can be avoided when appropriate range of motion and flexibility can be obtained with conservative treatment (stretching). It should also be avoided if there are contractures of multiple tendons in the leg, and not just the  gastroc.     Treatment  The surgery can be performed through several different incisions. Most commonly, a small incision is made on the inner side of the lower leg. Sometimes an incision directly in the back of the calf is used, or even an endoscopic incision, which is about ½ inch. Once the gastroc tendon is identified, it is  from the underlying muscle belly of the soleus, then cut straight across. Once the tendon is released, the ankle is flexed up and an increased range of motion is noted intra-operatively.     Recovery  For the first two weeks after surgery, the patient typically is immobilized in a splint or boot. It is important to keep the ankle in a proper position while the tendon is healing. A cramping feeling in the back of the calf is normal. Gentle range of motion and stretching begin once the ankle is removed from the splint/boot. Timing can vary depending upon what other procedures are done.     Risks and Complications  After a gastroc release, some patients experience nerve injury that results in irritation or numbness over the outside of the heel. This usually is temporary. In addition, some patients may notice a difference in the appearance of one calf compared to the other and temporary calf weakness.    FAQs  Why are my calf muscles tight?   Most frequently a tight calf muscle is an inherited problem that only causes problems later in life. Other reasons for calf tightness are nerve injuries, muscle problems, and other medical problems like stroke and diabetes. People can also get tight calf muscles after trauma to the leg, ankle, or foot.    Will a gastrocnemius lengthening affect my strength or ability to walk?  This procedure will cause some weakness but most patients will not notice it. Some patients may have a subtle limp, but this typically resolves within six months of surgery.

## 2023-05-22 NOTE — PROGRESS NOTES
[facility] PODIATRY  [address] [place] [address], [address]  [place] [address]  Dept: [phone]  Dept Fax: [phone]    Karl Joseph Jr., DPM     Assessment:   MDM    Coding  1. Neuroma of second interspace of right foot  Ambulatory referral/consult to Physical/Occupational Therapy    Neuroma injection      2. Bursitis of intermetatarsal bursa of right foot  Neuroma injection      3. Metatarsalgia, right foot  Ambulatory referral/consult to Physical/Occupational Therapy    Neuroma injection      4. 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  Ambulatory referral/consult to Physical/Occupational Therapy          Plan:     Neuroma injection    Date/Time: 5/22/2023 3:39 PM  Performed by: Karl Joseph Jr., DPM  Authorized by: Karl Joseph Jr., DPM     Consent Done?:  Yes (Verbal)  Indications:  Pain  Site marked: the procedure site was marked    Timeout: prior to procedure the correct patient, procedure, and site was verified    Prep: patient was prepped and draped in usual sterile fashion      Local anesthesia used?: Yes   Anesthesia:  Local infiltration  Local anesthetic:  Bupivacaine 0.25% without epinephrine  Location Right Foot:  Second Webspace  Needle size:  25 G  Approach:  Dorsal  Medications:  40 mg triamcinolone acetonide 40 mg/mL  Patient tolerance:  Patient tolerated the procedure well with no immediate complications    Peng was seen today for follow-up and foot pain.    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 All questions/concerns addressed  -all tx options discussed. All alternatives, risks, benefits of all txs discussed  -the patient was educated about the diagnosis  -We discussed conservative care options possible including but not limited to shoe wear and/or padding, bracing/strapping, at home ROM, formal PT, medical therapy, injection therapy  - The utilization of NSAIDs can be considered but their benefit has to be tempered against the risk of GI/ concerns  - A steroid injection can be undertaken.  We did discuss the potential mechanism of action of this shot.  Understanding that multiple injections at the same anatomic site do have deleterious effects on the soft tissue.  Generic risks include: steroid flare (advised to ice if necessary), skin hypo-pgimentation (which can be permanent and unsightly), elevation of blood sugar, subcutaneous atrophy (can be permanent) and infection.   -XR/imaging reviewed by me: agree with read  -labs reviewed by me: ok for vgel  -implemented icing/stretching regimen  -offloading pads dispensed  -px as above    -rxs dispensed: none  -referrals: PT  -WB: wbat      Follow up if symptoms worsen or fail to improve.    Subjective:      Patient ID: Peng Sullivan is a 78 y.o. male.    Chief Complaint:   Chief Complaint   Patient presents with    Follow-up    Foot Pain     Right foot pain       CC - foot pain: patient presents to the podiatry clinic  with complaint of  right foot pain. Onset of the symptoms was several weeks ago. Precipitating event: unk. Current symptoms include: ability to bear weight, but with some pain, krissy the ball of the foot, swelling and worsening symptoms after a period of activity, burning/numbness/tingling of toes. Aggravating factors: walking and certain shoegear. Symptoms have gradually worsened. Patient has had no prior foot problems. Evaluation to  date: plain films: normal. Treatment to date: avoidance of offending activity. Patients rates pain 8/10 on pain scale.      5/22/23:  Hx as above. Pain improved but not gone after dex inj last visit.    Foot Pain    Follow-up      Last Podiatry Enc: Visit date not found  Last Enc w/ Me: Visit date not found    Outside reports reviewed: historical medical records.  Family hx: as below  Past Medical History:   Diagnosis Date    Degenerative lumbar disc 03/08/2021    Diverticulosis     Hemorrhoids     High cholesterol     History of colon polyps 6/2/99, 6/3/19    Hypertension     Prostate cancer      Past Surgical History:   Procedure Laterality Date    APPENDECTOMY  01/01/1954    COLONOSCOPY  06/03/2019    [facility]    COLONOSCOPY N/A 07/19/2019    Procedure: COLONOSCOPY/pos emr;  Surgeon: Shantanu Lee MD;  Location: [facility] ([address] [place] [address]);  Service: Endoscopy;  Laterality: N/A;  Referred by Dr. BERTO Keyes-see media tab for scanned records-      HT: 5'11, WT: 254.4, BMI: 35.5     Family History   Problem Relation Age of Onset    Heart attack Father     Heart disease Father         Congestive heart failure    No Known Problems Mother     No Known Problems Maternal Aunt     No Known Problems Maternal Uncle     No Known Problems Paternal Aunt     No Known Problems Paternal Uncle     No Known Problems Paternal Grandmother     No Known Problems Paternal Grandfather     No Known Problems Maternal Grandmother     No Known Problems Maternal Grandfather     Cataracts Sister     Colon cancer Sister     Breast cancer Sister     No Known Problems Sister     No Known Problems Sister     No Known Problems Sister     No Known Problems Brother     Amblyopia Neg Hx     Blindness Neg Hx     Diabetes Neg Hx     Glaucoma Neg Hx     Hypertension Neg Hx     Macular degeneration Neg Hx     Retinal detachment Neg Hx     Strabismus Neg Hx     Stroke Neg Hx     Thyroid disease Neg Hx      Current Outpatient Medications    Medication Sig Dispense Refill    atorvastatin (LIPITOR) 10 MG tablet TAKE 1 TABLET BY MOUTH ONCE DAILY. 90 tablet 3    cholecalciferol, vitamin D3, (VITAMIN D3) 25 mcg (1,000 unit) capsule Taking every other day      glucosamine-chondroitin 500-400 mg tablet Take 1 tablet by mouth 2 (two) times a day.      hydroCHLOROthiazide (MICROZIDE) 12.5 mg capsule TAKE 1 CAPSULE BY MOUTH ONCE DAILY. 90 capsule 3    losartan (COZAAR) 50 MG tablet TAKE 1 TABLET BY MOUTH ONCE DAILY. 90 tablet 3     No current facility-administered medications for this visit.     Review of patient's allergies indicates:  No Known Allergies  Social History     Socioeconomic History    Marital status:    Tobacco Use    Smoking status: Former     Packs/day: 0.50     Years: 20.00     Pack years: 10.00     Types: Cigarettes     Start date:      Quit date:      Years since quittin.4    Smokeless tobacco: Former     Types: Chew     Quit date:    Substance and Sexual Activity    Alcohol use: Not Currently     Comment: 12 beers per year.    Drug use: No    Sexual activity: Yes     Partners: Female     Birth control/protection: None     Social Determinants of Health     Financial Resource Strain: Low Risk     Difficulty of Paying Living Expenses: Not hard at all   Food Insecurity: No Food Insecurity    Worried About Running Out of Food in the Last Year: Never true    Ran Out of Food in the Last Year: Never true   Transportation Needs: No Transportation Needs    Lack of Transportation (Medical): No    Lack of Transportation (Non-Medical): No   Physical Activity: Sufficiently Active    Days of Exercise per Week: 5 days    Minutes of Exercise per Session: 30 min   Stress: No Stress Concern Present    Feeling of Stress : Not at all   Social Connections: Unknown    Frequency of Communication with Friends and Family: More than three times a week    Frequency of Social Gatherings with Friends and Family: Once a week    Active Member of  "Clubs or Organizations: Yes    Attends Club or Organization Meetings: More than 4 times per year    Marital Status:    Housing Stability: Low Risk     Unable to Pay for Housing in the Last Year: No    Number of Places Lived in the Last Year: 1    Unstable Housing in the Last Year: No       ROS    REVIEW OF SYSTEMS: Negative as documented below as well as positive findings in bold.       Constitutional  Respiratory  Gastrointestinal  Skin   - Fever - Cough - Heartburn - Rash   - Chills - Spit blood - Nausea - Itching   - Weight Loss - Shortness of breath - Vomiting - Nail pain   - Malaise/Fatigue - Wheezing - Abdominal Pain  Wound/Ulcer   - Weight Gain   - Blood in Stool  Poor wound healing       - Diarrhea          Cardiovascular  Genitourinary  Neurological  HEENT   - Chest Pain - Dysuria - Burning Sensation of feet - Headache   - Palpitations - Hematuria - Tingling / Paresthesia - Congestion   - Pain at night in legs - Flank Pain - Dizziness - Sore Throat   - Cramping   - Tremor - Blurred Vision   - Leg Swelling   - Sensory Change - Double Vision   - Dizzy when standing   - Speech Change - Eye Redness       - Focal Weakness - Dry Eyes       - Loss of Consciousness          Endocrine  Musculoskeletal  Psychiatric   - Cold intolerance - Muscle Pain - Depression   - Heat intolerance - Neck Pain - Insomnia   - Anemia - Joint Pain - Memory Loss   -  Easy bruising, bleeding - Heel pain - Anxiety      Toe Pain        Leg/Ankle/Foot Pain         Objective:     /77 (BP Location: Left arm, Patient Position: Sitting)   Pulse 69   Ht 5' 11.5" (1.816 m)   Wt 110.9 kg (244 lb 7.8 oz)   BMI 33.62 kg/m²   Vitals:    05/22/23 1501   BP: 126/77   Pulse: 69   Weight: 110.9 kg (244 lb 7.8 oz)   Height: 5' 11.5" (1.816 m)   PainSc:   2   PainLoc: Foot       Physical Exam    General Appearance:   Patient appears well developed, well nourished  Patient appears stated age    Psychiatric:   Patient is oriented to time, " place, and person.  Patient has appropriate mood and affect    Neck:  Trachea Midline  No visible masses    Respiratory/Ears:  No distress or labored breathing.  Able to differentiate between normal talking voice and whisper.  Able to follow commands    Eyes:  Visual Acuity intact  Lids and conjunctivae normal. No discoloration noted.    Foot Exam  Physical Exam  Ortho Exam  Ortho/SPM Exam  Foot/Ankle Musculoskeletal Exam    R LE exam con't:  V:  DP 2/4, PT 2/4   CRT< 3s to all digits tested   Tibial and popliteal lymph nodes are w/o abnormality   Edema: absent, varicosities: present    N:  Patient displays normal ankle reflexes   SILT in SP/DP/T/Nilton/Saph distributions    Ortho: +Motor EHL/FHL/TA/GA   equinus deformity present  There is moderate pain with palpation of 2nd IS  There is is moderate pain at 2nd IS with lateral compression of metatarsal heads  Compartments soft/compressible. No pain on passive stretch of big toe. No calf Pain.    Derm:  skin intact, skin warm and dry, skin without ulcers or lesions, skin without induration, nails normal, no ecchymosis      Imaging / Labs:      No results found.        Note: This was dictated using a computer transcription program. Although proofread, it may contain computer transcription errors and phonetic errors. Other human proofreading errors may also exist. Corrections may be performed at a later time. Please contact us for any clarification if needed.    Karl Joseph DPM  [facility]

## 2023-06-02 ENCOUNTER — LAB VISIT (OUTPATIENT)
Dept: LAB | Facility: HOSPITAL | Age: 78
End: 2023-06-02
Attending: STUDENT IN AN ORGANIZED HEALTH CARE EDUCATION/TRAINING PROGRAM
Payer: MEDICARE

## 2023-06-02 DIAGNOSIS — M17.11 UNILATERAL PRIMARY OSTEOARTHRITIS, RIGHT KNEE: Primary | ICD-10-CM

## 2023-06-02 DIAGNOSIS — C61 PROSTATE CANCER: ICD-10-CM

## 2023-06-02 LAB — COMPLEXED PSA SERPL-MCNC: 9.6 NG/ML (ref 0–4)

## 2023-06-02 PROCEDURE — 84153 ASSAY OF PSA TOTAL: CPT | Performed by: STUDENT IN AN ORGANIZED HEALTH CARE EDUCATION/TRAINING PROGRAM

## 2023-06-02 PROCEDURE — 36415 COLL VENOUS BLD VENIPUNCTURE: CPT | Performed by: STUDENT IN AN ORGANIZED HEALTH CARE EDUCATION/TRAINING PROGRAM

## 2023-06-05 ENCOUNTER — OFFICE VISIT (OUTPATIENT)
Dept: UROLOGY | Facility: CLINIC | Age: 78
End: 2023-06-05
Payer: MEDICARE

## 2023-06-05 VITALS
HEART RATE: 65 BPM | WEIGHT: 241.38 LBS | BODY MASS INDEX: 32.69 KG/M2 | DIASTOLIC BLOOD PRESSURE: 79 MMHG | SYSTOLIC BLOOD PRESSURE: 130 MMHG | HEIGHT: 72 IN

## 2023-06-05 DIAGNOSIS — C61 PROSTATE CANCER: Primary | ICD-10-CM

## 2023-06-05 PROCEDURE — 1159F PR MEDICATION LIST DOCUMENTED IN MEDICAL RECORD: ICD-10-PCS | Mod: CPTII,S$GLB,, | Performed by: STUDENT IN AN ORGANIZED HEALTH CARE EDUCATION/TRAINING PROGRAM

## 2023-06-05 PROCEDURE — 99214 OFFICE O/P EST MOD 30 MIN: CPT | Mod: S$GLB,,, | Performed by: STUDENT IN AN ORGANIZED HEALTH CARE EDUCATION/TRAINING PROGRAM

## 2023-06-05 PROCEDURE — 3078F PR MOST RECENT DIASTOLIC BLOOD PRESSURE < 80 MM HG: ICD-10-PCS | Mod: CPTII,S$GLB,, | Performed by: STUDENT IN AN ORGANIZED HEALTH CARE EDUCATION/TRAINING PROGRAM

## 2023-06-05 PROCEDURE — 3075F PR MOST RECENT SYSTOLIC BLOOD PRESS GE 130-139MM HG: ICD-10-PCS | Mod: CPTII,S$GLB,, | Performed by: STUDENT IN AN ORGANIZED HEALTH CARE EDUCATION/TRAINING PROGRAM

## 2023-06-05 PROCEDURE — 3078F DIAST BP <80 MM HG: CPT | Mod: CPTII,S$GLB,, | Performed by: STUDENT IN AN ORGANIZED HEALTH CARE EDUCATION/TRAINING PROGRAM

## 2023-06-05 PROCEDURE — 1126F AMNT PAIN NOTED NONE PRSNT: CPT | Mod: CPTII,S$GLB,, | Performed by: STUDENT IN AN ORGANIZED HEALTH CARE EDUCATION/TRAINING PROGRAM

## 2023-06-05 PROCEDURE — 99999 PR PBB SHADOW E&M-EST. PATIENT-LVL III: CPT | Mod: PBBFAC,,, | Performed by: STUDENT IN AN ORGANIZED HEALTH CARE EDUCATION/TRAINING PROGRAM

## 2023-06-05 PROCEDURE — 1159F MED LIST DOCD IN RCRD: CPT | Mod: CPTII,S$GLB,, | Performed by: STUDENT IN AN ORGANIZED HEALTH CARE EDUCATION/TRAINING PROGRAM

## 2023-06-05 PROCEDURE — 3288F FALL RISK ASSESSMENT DOCD: CPT | Mod: CPTII,S$GLB,, | Performed by: STUDENT IN AN ORGANIZED HEALTH CARE EDUCATION/TRAINING PROGRAM

## 2023-06-05 PROCEDURE — 99999 PR PBB SHADOW E&M-EST. PATIENT-LVL III: ICD-10-PCS | Mod: PBBFAC,,, | Performed by: STUDENT IN AN ORGANIZED HEALTH CARE EDUCATION/TRAINING PROGRAM

## 2023-06-05 PROCEDURE — 1126F PR PAIN SEVERITY QUANTIFIED, NO PAIN PRESENT: ICD-10-PCS | Mod: CPTII,S$GLB,, | Performed by: STUDENT IN AN ORGANIZED HEALTH CARE EDUCATION/TRAINING PROGRAM

## 2023-06-05 PROCEDURE — 3288F PR FALLS RISK ASSESSMENT DOCUMENTED: ICD-10-PCS | Mod: CPTII,S$GLB,, | Performed by: STUDENT IN AN ORGANIZED HEALTH CARE EDUCATION/TRAINING PROGRAM

## 2023-06-05 PROCEDURE — 99214 PR OFFICE/OUTPT VISIT, EST, LEVL IV, 30-39 MIN: ICD-10-PCS | Mod: S$GLB,,, | Performed by: STUDENT IN AN ORGANIZED HEALTH CARE EDUCATION/TRAINING PROGRAM

## 2023-06-05 PROCEDURE — 1101F PR PT FALLS ASSESS DOC 0-1 FALLS W/OUT INJ PAST YR: ICD-10-PCS | Mod: CPTII,S$GLB,, | Performed by: STUDENT IN AN ORGANIZED HEALTH CARE EDUCATION/TRAINING PROGRAM

## 2023-06-05 PROCEDURE — 1101F PT FALLS ASSESS-DOCD LE1/YR: CPT | Mod: CPTII,S$GLB,, | Performed by: STUDENT IN AN ORGANIZED HEALTH CARE EDUCATION/TRAINING PROGRAM

## 2023-06-05 PROCEDURE — 3075F SYST BP GE 130 - 139MM HG: CPT | Mod: CPTII,S$GLB,, | Performed by: STUDENT IN AN ORGANIZED HEALTH CARE EDUCATION/TRAINING PROGRAM

## 2023-06-05 RX ORDER — HYLAN G-F 20 16MG/2ML
6 SYRINGE (ML) INTRAARTICULAR
COMMUNITY

## 2023-06-05 NOTE — PROGRESS NOTES
[facility]  Urologic Oncology Clinic Note        Date of Service: 6/5/2023      Chief Complaint/Reason for Consultation: Low Risk Prostate Cancer    Requesting Provider:   No referring provider defined for this encounter.      History of Present Illness:   Patient 78 y.o. male presents with hx of low risk prostate cancer.     No family hx of prostate cancer.   Only voiding complaint is daytime frequency. Nocturia 0-1. Some mild ED, not taking PDE5I.    No other new changes in health. Having some trouble with right lower extremity, but no heart attacks or strokes.     Urologic History:     10/7/2022 -- PSA 7.4  11/4/2022 -- mpMRI prostate -- PV 32cc, PSA 7.4, PSAD 0.23; MARKUS 1 PIRADS 3 Left anterior TZ  11/30/2022 -- UroNav + systematic bx -- LA 3+3 1/1 cores, target negative, all other cores benign  12/29/2022 -- IPSS 13, nocturia 0-1; EUGENE 21  6/2/2023 -- PSA 9.6      Patient Active Problem List    Diagnosis Date Noted    Prostate cancer 04/27/2023    Degenerative lumbar disc 03/08/2021    Seasonal allergic rhinitis due to pollen 04/21/2020    Obesity (BMI 30.0-34.9) 03/03/2020    Prostate cancer screening 03/03/2020    Bronchitis 11/19/2019    Mixed hyperlipidemia 08/22/2019    Benign essential hypertension 08/22/2019    Colon adenoma 07/19/2019    Posterior vitreous detachment of both eyes 10/25/2012    Nuclear sclerotic cataract of both eyes 10/25/2012        Review of patient's allergies indicates:  No Known Allergies     Past Medical History:   Diagnosis Date    Degenerative lumbar disc 03/08/2021    Diverticulosis     Hemorrhoids     High cholesterol     History of colon polyps 6/2/99, 6/3/19    Hypertension     Prostate cancer       Past Surgical History:   Procedure Laterality Date    APPENDECTOMY  01/01/1954    COLONOSCOPY  06/03/2019    [facility]    COLONOSCOPY N/A 07/19/2019    Procedure: COLONOSCOPY/pos emr;  Surgeon: Shantanu Lee MD;  Location: [facility] ([address] [place] [address]);  Service:  "Endoscopy;  Laterality: N/A;  Referred by Dr. BERTO Keyes-see media tab for scanned records-      HT: 5'11, WT: 254.4, BMI: 35.5      Family History   Problem Relation Age of Onset    Heart attack Father     Heart disease Father         Congestive heart failure    No Known Problems Mother     No Known Problems Maternal Aunt     No Known Problems Maternal Uncle     No Known Problems Paternal Aunt     No Known Problems Paternal Uncle     No Known Problems Paternal Grandmother     No Known Problems Paternal Grandfather     No Known Problems Maternal Grandmother     No Known Problems Maternal Grandfather     Cataracts Sister     Colon cancer Sister     Breast cancer Sister     No Known Problems Sister     No Known Problems Sister     No Known Problems Sister     No Known Problems Brother     Amblyopia Neg Hx     Blindness Neg Hx     Diabetes Neg Hx     Glaucoma Neg Hx     Hypertension Neg Hx     Macular degeneration Neg Hx     Retinal detachment Neg Hx     Strabismus Neg Hx     Stroke Neg Hx     Thyroid disease Neg Hx       Social History     Tobacco Use    Smoking status: Former     Packs/day: 0.50     Years: 20.00     Pack years: 10.00     Types: Cigarettes     Start date:      Quit date:      Years since quittin.4    Smokeless tobacco: Former     Types: Chew     Quit date:    Substance Use Topics    Alcohol use: Not Currently     Comment: 12 beers per year.        Review of Systems   Constitutional:  Negative for activity change, fatigue and fever.   HENT:  Negative for congestion.    Respiratory:  Negative for cough.    Cardiovascular:  Negative for chest pain.   Gastrointestinal:  Negative for abdominal pain.   Genitourinary:  Negative for dysuria and hematuria.           OBJECTIVE:     Vitals:    23 0936   BP: 130/79   Pulse: 65   Weight: 109.5 kg (241 lb 6.4 oz)   Height: 5' 11.5" (1.816 m)       General Appearance: Alert, cooperative, no distress  Head: Normocephalic  Eyes: Clear " conjunctiva  Neck: No obvious LND or JVD  Lungs: Normal chest excursion, no accessory muscle use  Chest: Regular rate rhythm by palpation, no pedal edema  Abdomen: Soft, non-tender, non-distended  Male Genitalia: CHEYENNE negative  Extremities: Atraumatic   Lymph Nodes: No appreciable lymph adenopathy  Neurologic: No gross gait, motor or sensory deficits            LAB:        Lab Results   Component Value Date    PSA 3.5 03/03/2020    PSA 1.0 12/26/2007    PSADIAG 9.6 (H) 06/02/2023    PSADIAG 7.4 (H) 10/07/2022    PSADIAG 7.9 (H) 09/07/2022    PSADIAG 4.8 (H) 09/10/2021    PSADIAG 4.1 (H) 09/01/2020    PSATOTAL 4.4 (H) 09/01/2020    PSAFREE 1.11 09/01/2020    PSAFREEPCT 25.23 09/01/2020     CMP  Sodium   Date Value Ref Range Status   10/07/2022 143 136 - 145 mmol/L Final     Potassium   Date Value Ref Range Status   10/07/2022 5.1 3.5 - 5.1 mmol/L Final     Chloride   Date Value Ref Range Status   10/07/2022 105 95 - 110 mmol/L Final     CO2   Date Value Ref Range Status   10/07/2022 29 23 - 29 mmol/L Final     Glucose   Date Value Ref Range Status   10/07/2022 94 70 - 110 mg/dL Final     BUN   Date Value Ref Range Status   10/07/2022 21 8 - 23 mg/dL Final     Creatinine   Date Value Ref Range Status   10/07/2022 0.9 0.5 - 1.4 mg/dL Final     Calcium   Date Value Ref Range Status   10/07/2022 9.7 8.7 - 10.5 mg/dL Final     Total Protein   Date Value Ref Range Status   10/07/2022 6.8 6.0 - 8.4 g/dL Final     Albumin   Date Value Ref Range Status   10/07/2022 4.0 3.5 - 5.2 g/dL Final     Total Bilirubin   Date Value Ref Range Status   10/07/2022 0.6 0.1 - 1.0 mg/dL Final     Comment:     For infants and newborns, interpretation of results should be based  on gestational age, weight and in agreement with clinical  observations.    Premature Infant recommended reference ranges:  Up to 24 hours.............<8.0 mg/dL  Up to 48 hours............<12.0 mg/dL  3-5 days..................<15.0 mg/dL  6-29  days.................<15.0 mg/dL       Alkaline Phosphatase   Date Value Ref Range Status   10/07/2022 65 55 - 135 U/L Final     AST   Date Value Ref Range Status   10/07/2022 19 10 - 40 U/L Final     ALT   Date Value Ref Range Status   10/07/2022 24 10 - 44 U/L Final     Anion Gap   Date Value Ref Range Status   10/07/2022 9 8 - 16 mmol/L Final     eGFR   Date Value Ref Range Status   10/07/2022 >60.0 >60 mL/min/1.73 m^2 Final         IMAGIN/4/2022  MRI PROSTATE W W/O CONTRAST     CLINICAL HISTORY:  Prostate cancer suspected;  Elevated prostate specific antigen (PSA)     Additional history: None provided.     TECHNIQUE:  Multiparametric MRI of the prostate/pelvis performed on a 3T scanner with phase pelvic coil. Multiplanar, multisequence images including high resolution, small field-of-view T2-WI; axial diffusion weighted images with multiple B-values and creation of ADC-maps; and dynamic contrast enhanced T1-weighted images through the prostate were obtained before, during, and after the administration of 10 cc intravenous gadolinium.     COMPARISON:  None.     FINDINGS:  Diffusion-weighted sequences are degraded by susceptibility artifact from air in the rectum.     Previous biopsy: None     PSA: 7.4 ng/mL 10/07/2022     Prior therapy: None     Prostate: 4.3 x 3.6 x 4.6 cm corresponding to a computed volume of 32 cc.     Peripheral zone: Atrophic right anterior peripheral zone with bandlike T2 hypointensities, likely reflecting fibrosis.  No focal lesion concerning for prostate cancer.     Transitional zone: Benign prostatic hyperplasia.  There is a focal lesion in the left transition zone, as follows:     Lesion (MARKUS) #T-1     Location: Side: left; Region: mid; Zone: Anterior transition zone     Greatest dimension: 1.8 cm     T2-WI: Mostly encapsulated nodule, score 2.     DWI/ADC: Focal markedly hypointense on ADC and markedly hyperintense on high b-value DWI; >1.5 cm in greatest dimension, score  5.     DCE: Positive     Extraprostatic extension: Negative     PI-RADS assessment category: 3     Neurovascular bundle: Normal appearance.     Seminal vesicles: Normal appearance.     Adjacent Organ Involvement: No evidence for urinary bladder or rectal invasion.  No focal or asymmetric urinary bladder wall thickening.     Lymphadenopathy: None.     Other Findings: None.     Impression:     1.8 cm PI-RADS 3 lesion in the left anterior transition zone at the midgland.     Overall Assessment: PI-RADS 3 - Intermediate (the presence of clinically significant cancer is equivocal)     Number of targets created for potential MR/US fusion biopsy     Peripheral zone: 0     Transition zone: 1     Electronically signed by resident: Fabian Snyder MD  Date:                                            11/04/2022  Time:                                           09:48     Electronically signed by: Nilton Sharma  Date:                                            11/04/2022  Time:                                           15:46        ASSESSMENT/PLAN:     78 yo M w/ hx of low risk prostate cancer     Plan:    Today we reviewed the incidence, risk factors, and natural history of prostate cancer and [place] 3+3 disease which the patient was diagnosed with previously.     After we reviewed the treatment options for prostate cancer which include but are not limited to surgery, radiation, cryo, HIFU, hormones and minimally invasive options [laser, focal therapy etc], we spoke of active surveillance (AS) as a management strategy for his NCCN low risk prostate cancer. I pointed out the differences between AS and watchful waiting (WW) whereby the former approach utilizes periodic reassessment with the possibility of later entering active treatment when cure is still possible compared to the latter which does not assess for cancer progression but will initiate palliative therapy if the patient becomes symptomatic.  We reviewed some of the  symptoms of advanced disease including weight loss, bone pain, worsened LUTS etc.   I told the patient that by electing AS or no treatment, and because this is prostate cancer, there is always the chance for cancer progression and loss of the window of curability. This is a risk that the patient must be willing to accept.  Active surveillance has not been prospectively validated in clinical trials in intermediate-risk disease, but it may be considered in favorable intermediate-risk disease.  However, for men with intermediate risk disease, active surveillance may come with a higher risk of developing metastases compared to definitive treatment. Other drawbacks of AS include the need for continual monitoring with PSA, clinic visits, occasional prostate biopsies and their attendant risks/costs and possibly imaging studies.  For some patients on a surveillance strategy, there may be a psychological burden to obviating active treatment of prostate cancer.     We outlined various methods to monitor patients on AS, which at a minimum includes every 6-12 month PSAs, DREs and visits.  Initially I recommend a confirmatory test within 6 months of diagnosis with repeat biopsy or mpMRI, though I favor biopsy. If our confirmatory test reveals the same grade and volume of cancer then we will continue on with AS with surveillance using a low or high-intensity plan. Then we talk about some reasons to transition to active treatment such as (1) a greater volume of cancer detected at biopsy, (2) higher grade cancer at biopsy, (3) patient concern or preference.      Finally I told the patient that even though we might think the patient is a candidate for AS, understaging and undergrading does occur, and knowledge of whether the patient is actually a good candidate is incomplete.  I could not make any guarantee that the patient was making the right decision or that his cancer may later be cured if the patient eventually opted for  treatment. The patient expressed understanding of these issues.     Lastly, I did tell him that with his age and such low volume 3+3 prostate cancer that I think observation would be a more reasonable management strategy. I told him that his risk of passing from prostate cancer in his lifetime is very low and that I do not think that he would be a candidate for treatment even if he mild grade progression since its still likely that with low volume 3+4 that his overall survival would be unchanged.     Plan to get repeat MRI in Nov 2023.     Patient was allowed to ask questions, all were answered, he was accompanied by his wife, they appeared to understand counseling well.        The total time for the established patient visit was at least 30 minutes in both face-to-face and non-face-to-face activities, which included chart review, interpretation of results, counseling, education, ordering meds/tests/procedures, and/or coordination of care.     Maury Cartagena MD  Urologic Oncology  P: [phone]

## 2023-06-19 ENCOUNTER — CLINICAL SUPPORT (OUTPATIENT)
Dept: REHABILITATION | Facility: HOSPITAL | Age: 78
End: 2023-06-19
Attending: PODIATRIST
Payer: MEDICARE

## 2023-06-19 DIAGNOSIS — M77.41 METATARSALGIA, RIGHT FOOT: ICD-10-CM

## 2023-06-19 DIAGNOSIS — R52 PAIN: ICD-10-CM

## 2023-06-19 DIAGNOSIS — G57.61 NEUROMA OF SECOND INTERSPACE OF RIGHT FOOT: ICD-10-CM

## 2023-06-19 DIAGNOSIS — M21.861 ACQUIRED POSTERIOR EQUINUS OF RIGHT LOWER EXTREMITY: ICD-10-CM

## 2023-06-19 DIAGNOSIS — M17.11 UNILATERAL PRIMARY OSTEOARTHRITIS, RIGHT KNEE: ICD-10-CM

## 2023-06-19 PROCEDURE — 97161 PT EVAL LOW COMPLEX 20 MIN: CPT | Performed by: PHYSICAL THERAPIST

## 2023-06-19 PROCEDURE — 97110 THERAPEUTIC EXERCISES: CPT | Performed by: PHYSICAL THERAPIST

## 2023-06-19 NOTE — PLAN OF CARE
[facility]   Physical Therapy Initial Evaluation      Name: Peng Sullivan  Clinic Number: [id number]    Therapy Diagnosis:   M17.11 (ICD-10-CM) - Unilateral primary osteoarthritis, right knee        Physician: Karl Joseph Jr., *    Physician Orders: PT Eval and Treat   Medical Diagnosis from Referral: M17.11 (ICD-10-CM) - Unilateral primary osteoarthritis, right knee   Evaluation Date: 6/19/2023  Authorization Period Expiration: 6-19-24  Plan of Care Expiration: 9-30-23  Visit # / Visits authorized: 1/ 1   FOTO: 1/5    Precautions: Standard     Time In: 10:00 am  Time Out: 10:35 am  Total Appointment Time (timed & untimed codes): 35 minutes    Subjective     Date of onset: 3-4 yrs ago  History of current condition - Peng reports: having h/o R knee pain that was helped by several CSI in the past.  States recently sx have increased and that knee has buckled on him several times.  Pt c/o pain mainly with wt-bearing activities.  Pt feels pain limits his ADL.       Past Medical History:   Diagnosis Date    Degenerative lumbar disc 03/08/2021    Diverticulosis     Hemorrhoids     High cholesterol     History of colon polyps 6/2/99, 6/3/19    Hypertension     Prostate cancer      Peng Sullivan  has a past surgical history that includes Appendectomy (01/01/1954); Colonoscopy (06/03/2019); and Colonoscopy (N/A, 07/19/2019).    Peng has a current medication list which includes the following prescription(s): atorvastatin, cholecalciferol (vitamin d3), hydrochlorothiazide, synvisc-one, and losartan.    Review of patient's allergies indicates:  No Known Allergies     Imaging: na    Prior Therapy: na  Social History:  lives with their spouse  Occupation: na  Prior Level of Function: walking daily  Current Level of Function: ADL limited    Pain:  Current 0/10, worst 3/10, best 0/10   Location: right knee   Description: Aching and Sharp  Aggravating Factors: Standing and Walking  Easing  Factors: rest    Pts goals: decrease pain    Objective        Functional assessment:   - walking:   independent with a mild flexion gait             AROM:  -5 deg ext and 120 flexion     MMT:   hip abductors 4-/5, hams 4+/5 and quads 4/5    Tone:  decreased quads; POOR QS ability; mild lag with SLR    Flexibility testing:   tight hams    Special tests:   neg Lachman and Husam's    Palpation:   TTP medial joint line    Joint mobility: fair    Swelling:  MIN    Other:  decreased balance in SLS    CMS Impairment/Limitation/Restriction for FOTO knee Survey    Therapist reviewed FOTO scores for Peng Sullivan on 6/19/2023.   FOTO documents entered into [employer] - see Media section.         TREATMENT     Treatment Time In: 10:00 am  Treatment Time Out: 10:35 am  Total Treatment time separate from Evaluation time: 15 min    Treatment:  HEP  HSS    Home Exercises and Patient Education Provided    Education provided:   - ice for pain/swelling    Written Home Exercises Provided: yes.  Exercises were reviewed and Peng was able to demonstrate them prior to the end of the session.  Peng demonstrated good  understanding of the education provided.     See EMR under Media for exercises provided 6/19/2023.      Assessment     Peng is a 78 y.o. male referred to outpatient Physical Therapy with a medical diagnosis of M17.11 (ICD-10-CM) - Unilateral primary osteoarthritis, right knee .  Pt presents with R knee pain, decreased ROM, weakness and impaired gait that limit ADL.    Pt prognosis is Good.   Pt will benefit from skilled outpatient Physical Therapy to address the deficits stated above and in the chart below, provide pt/family education, and to maximize pt's level of independence.     Plan of care discussed with patient: Yes  Pt's spiritual, cultural and educational needs considered and patient is agreeable to the plan of care and goals as stated below:     Anticipated Barriers for therapy: none    Medical Necessity is  demonstrated by the following  History  Co-morbidities and personal factors that may impact the plan of care [x] LOW: no personal factors / co-morbidities  [] MODERATE: 1-2 personal factors / co-morbidities  [] HIGH: 3+ personal factors / co-morbidities    Moderate / High Support Documentation:   Co-morbidities affecting plan of care: HTN    Personal Factors:   no deficits     Examination  Body Structures and Functions, activity limitations and participation restrictions that may impact the plan of care [x] LOW: addressing 1-2 elements  [] MODERATE: 3+ elements  [] HIGH: 4+ elements (please support below)    Moderate / High Support Documentation: na     Clinical Presentation [x] LOW: stable  [] MODERATE: Evolving  [] HIGH: Unstable     Decision Making/ Complexity Score: low       Goals:  Short Term Goals: 2 weeks         1.   Independent with HEP        2.  Pt will report decreased pain level of < 50% from above measure with ADL    Long Term Goals:   GOALS:    8_   weeks. Pt agrees with goals set.  Independent with HEP.  Report decreased    R knee    pain  <   / =  3/10 with ADL such as walking > 30 min  Increased MMT  for  R LE to 4+/5 to 5/5  with ADL such as stair climbing  Increased arom  for  R knee to -2 deg ext to 125 deg flexion with functional activities such walking or self-care      Plan     Certification Period/Plan of care expiration: 6/19/2023 to 9-30-23.    Outpatient Physical Therapy 2 times weekly for 8 weeks to include the following interventions: Manual Therapy, Moist Heat/ Ice, Neuromuscular Re-ed, Patient Education, Therapeutic Activities, and Therapeutic Exercise.     Greg Temple, PT

## 2023-06-19 NOTE — PATIENT INSTRUCTIONS
ROM: Heel Prop    Lie with pillow under right heel. Tighten the muscles on the top of leg while trying to push knee toward the floor. Hold 1-5 minutes for a cumulative total of 30 minutes per day.       [email or web address]       Strengthening: Quadriceps Set    Tighten muscles on top of thighs by pushing knees down into surface. Hold 5 seconds.  Repeat 25 times . R/L or BL  leg per set. Do 1 sets per session. Do 2 sessions per day.      Straight Leg Raise     With R/L or BL leg straight, other leg bent, raise straight leg until knees are even. Slowly lower. Roll on your side and repeat lifting top leg up, on other side, lifting bottom leg up, and on stomach kicking back (squeezing your rear end before you kick back).  Repeat 25 times. Do 1 sets per session. Do 2 sessions per day.       Glute Squeeze, supine    Lie face up and squeeze your rear end. Do not tighten your abdominals or hold your breath. Squeeze our glutes and hold 5 seconds. Relax.  Repeat 25 times per set. Do 1 sets per session. Do 2 sessions per day.      Strengthening: Hip Abductor - Resisted    Lie flat with band looped around both legs above knees, and push thighs apart, ensuring right and left move together.  Repeat 25 times per set. Do 1 sets per session. Do 2 sessions per day.      Hamstring Stretch    Perform 5 times, 30 sec hold 2x day - (HEP to go)      Hamstring Curl        Perform 25 times, 2x per day. (HEP to GO)

## 2023-06-19 NOTE — PROGRESS NOTES
[facility]   Physical Therapy Initial Evaluation      Name: Peng Sullivan  Clinic Number: [id number]    Therapy Diagnosis:   M17.11 (ICD-10-CM) - Unilateral primary osteoarthritis, right knee        Physician: Karl Joseph Jr., *    Physician Orders: PT Eval and Treat   Medical Diagnosis from Referral: M17.11 (ICD-10-CM) - Unilateral primary osteoarthritis, right knee   Evaluation Date: 6/19/2023  Authorization Period Expiration: 6-19-24  Plan of Care Expiration: 9-30-23  Visit # / Visits authorized: 1/ 1   FOTO: 1/5    Precautions: Standard     Time In: 10:00 am  Time Out: 10:35 am  Total Appointment Time (timed & untimed codes): 35 minutes    Subjective     Date of onset: 3-4 yrs ago  History of current condition - Peng reports: having h/o R knee pain that was helped by several CSI in the past.  States recently sx have increased and that knee has buckled on him several times.  Pt c/o pain mainly with wt-bearing activities.  Pt feels pain limits his ADL.       Past Medical History:   Diagnosis Date    Degenerative lumbar disc 03/08/2021    Diverticulosis     Hemorrhoids     High cholesterol     History of colon polyps 6/2/99, 6/3/19    Hypertension     Prostate cancer      Peng Sullivan  has a past surgical history that includes Appendectomy ([birth year]01/1954); Colonoscopy (06/03/2019); and Colonoscopy (N/A, 07/19/2019).    Peng has a current medication list which includes the following prescription(s): atorvastatin, cholecalciferol (vitamin d3), hydrochlorothiazide, synvisc-one, and losartan.    Review of patient's allergies indicates:  No Known Allergies     Imaging: na    Prior Therapy: na  Social History:  lives with their spouse  Occupation: na  Prior Level of Function: walking daily  Current Level of Function: ADL limited    Pain:  Current 0/10, worst 3/10, best 0/10   Location: right knee   Description: Aching and Sharp  Aggravating Factors: Standing and Walking  Easing  Factors: rest    Pts goals: decrease pain    Objective        Functional assessment:   - walking:   independent with a mild flexion gait             AROM:  -5 deg ext and 120 flexion     MMT:   hip abductors 4-/5, hams 4+/5 and quads 4/5    Tone:  decreased quads; POOR QS ability; mild lag with SLR    Flexibility testing:   tight hams    Special tests:   neg Lachman and Husam's    Palpation:   TTP medial joint line    Joint mobility: fair    Swelling:  MIN    Other:  decreased balance in SLS    CMS Impairment/Limitation/Restriction for FOTO knee Survey    Therapist reviewed FOTO scores for Peng Sullivan on 6/19/2023.   FOTO documents entered into [email or web address] - see Media section.         TREATMENT     Treatment Time In: 10:00 am  Treatment Time Out: 10:35 am  Total Treatment time separate from Evaluation time: 15 min    Treatment:  HEP  HSS    Home Exercises and Patient Education Provided    Education provided:   - ice for pain/swelling    Written Home Exercises Provided: yes.  Exercises were reviewed and Peng was able to demonstrate them prior to the end of the session.  Peng demonstrated good  understanding of the education provided.     See EMR under Media for exercises provided 6/19/2023.      Assessment     Peng is a 78 y.o. male referred to outpatient Physical Therapy with a medical diagnosis of M17.11 (ICD-10-CM) - Unilateral primary osteoarthritis, right knee .  Pt presents with R knee pain, decreased ROM, weakness and impaired gait that limit ADL.    Pt prognosis is Good.   Pt will benefit from skilled outpatient Physical Therapy to address the deficits stated above and in the chart below, provide pt/family education, and to maximize pt's level of independence.     Plan of care discussed with patient: Yes  Pt's spiritual, cultural and educational needs considered and patient is agreeable to the plan of care and goals as stated below:     Anticipated Barriers for therapy: none    Medical Necessity is  demonstrated by the following  History  Co-morbidities and personal factors that may impact the plan of care [x] LOW: no personal factors / co-morbidities  [] MODERATE: 1-2 personal factors / co-morbidities  [] HIGH: 3+ personal factors / co-morbidities    Moderate / High Support Documentation:   Co-morbidities affecting plan of care: HTN    Personal Factors:   no deficits     Examination  Body Structures and Functions, activity limitations and participation restrictions that may impact the plan of care [x] LOW: addressing 1-2 elements  [] MODERATE: 3+ elements  [] HIGH: 4+ elements (please support below)    Moderate / High Support Documentation: na     Clinical Presentation [x] LOW: stable  [] MODERATE: Evolving  [] HIGH: Unstable     Decision Making/ Complexity Score: low       Goals:  Short Term Goals: 2 weeks         1.   Independent with HEP        2.  Pt will report decreased pain level of < 50% from above measure with ADL    Long Term Goals:   GOALS:    8_   weeks. Pt agrees with goals set.  Independent with HEP.  Report decreased    R knee    pain  <   / =  3/10 with ADL such as walking > 30 min  Increased MMT  for  R LE to 4+/5 to 5/5  with ADL such as stair climbing  Increased arom  for  R knee to -2 deg ext to 125 deg flexion with functional activities such walking or self-care      Plan     Certification Period/Plan of care expiration: 6/19/2023 to 9-30-23.    Outpatient Physical Therapy 2 times weekly for 8 weeks to include the following interventions: Manual Therapy, Moist Heat/ Ice, Neuromuscular Re-ed, Patient Education, Therapeutic Activities, and Therapeutic Exercise.     Greg Temple, PT

## 2023-06-27 ENCOUNTER — CLINICAL SUPPORT (OUTPATIENT)
Dept: REHABILITATION | Facility: HOSPITAL | Age: 78
End: 2023-06-27
Attending: PODIATRIST
Payer: MEDICARE

## 2023-06-27 DIAGNOSIS — R52 PAIN: Primary | ICD-10-CM

## 2023-06-27 PROCEDURE — 97110 THERAPEUTIC EXERCISES: CPT | Performed by: PHYSICAL THERAPIST

## 2023-06-27 NOTE — PROGRESS NOTES
Physical Therapy Daily Treatment Note     Name: Peng CHAVEZ [facility] Number: [id number]    Therapy Diagnosis:   Encounter Diagnosis   Name Primary?    Pain Yes     Physician: Karl Joseph Jr., *    Visit Date: 6/27/2023  Physician Orders: PT Eval and Treat   Medical Diagnosis from Referral: M17.11 (ICD-10-CM) - Unilateral primary osteoarthritis, right knee   Evaluation Date: 6/19/2023  Authorization Period Expiration: 6-19-24  Plan of Care Expiration: 9-30-23  Visit # / Visits authorized: 1/ 10     Time In: 1:00 pm  Time Out: 1:50 pm  Total Billable Time: 40 minutes    Precautions: Standard    Subjective     Pt reports: R knee feels OK.  He was compliant with home exercise program.  Response to previous treatment: na  Functional change: na    Pain: 0/10  Location: right knee      Objective     Lacks TKE.    Peng received therapeutic exercises to develop strength, ROM, flexibility, posture, and core stabilization for 40 minutes including:  Bike x 8 min  Pat MOBS/PROM x 5 min  Standing GTB TKE x 25 (all exercises)  Standing GTB hip abd and ext  GTB bridges  GTB clamshells  SLR with 3#  SAQ with 3#  Shuttle LP with 3.5 bands  SLP with 2.5 bands   HSS 5 x 30 sec hold  HEP review    Peng participated in neuromuscular re-education activities to improve:       Peng received cold pack for 8 min minutes to R knee.      Home Exercises Provided and Patient Education Provided     Education provided:   - ice for pain    Written Home Exercises Provided: Patient instructed to cont prior HEP.  Exercises were reviewed and Peng was able to demonstrate them prior to the end of the session.  Peng demonstrated good  understanding of the education provided.     See EMR under MEDIA for exercises provided prior visit.    Assessment     No pain with exercises.  Peng Is progressing well towards his goals.   Pt prognosis is Good.     Pt will continue to benefit from skilled outpatient physical therapy to address the deficits  listed in the problem list box on initial evaluation, provide pt/family education and to maximize pt's level of independence in the home and community environment.     Pt's spiritual, cultural and educational needs considered and pt agreeable to plan of care and goals.    Anticipated barriers to physical therapy: none    Goals: Short Term Goals: 2 weeks         1.   Independent with HEP        2.  Pt will report decreased pain level of < 50% from above measure with ADL     Long Term Goals:   GOALS:    8_   weeks. Pt agrees with goals set.  Independent with HEP.  Report decreased    R knee    pain  <   / =  3/10 with ADL such as walking > 30 min  Increased MMT  for  R LE to 4+/5 to 5/5  with ADL such as stair climbing  Increased arom  for  R knee to -2 deg ext to 125 deg flexion with functional activities such walking or self-care       Plan     Continue PT per POC.    Greg Temple, PT

## 2023-06-29 ENCOUNTER — PES CALL (OUTPATIENT)
Dept: ADMINISTRATIVE | Facility: CLINIC | Age: 78
End: 2023-06-29
Payer: MEDICARE

## 2023-06-30 ENCOUNTER — CLINICAL SUPPORT (OUTPATIENT)
Dept: REHABILITATION | Facility: HOSPITAL | Age: 78
End: 2023-06-30
Attending: PODIATRIST
Payer: MEDICARE

## 2023-06-30 DIAGNOSIS — R52 PAIN: Primary | ICD-10-CM

## 2023-06-30 PROCEDURE — 97110 THERAPEUTIC EXERCISES: CPT | Mod: HCNC | Performed by: PHYSICAL THERAPIST

## 2023-06-30 NOTE — PROGRESS NOTES
Physical Therapy Daily Treatment Note     Name: Peng CHAVEZ [facility] Number: [id number]    Therapy Diagnosis:   Encounter Diagnosis   Name Primary?    Pain Yes     Physician: Karl Joseph Jr., *    Visit Date: 6/30/2023  Physician Orders: PT Eval and Treat   Medical Diagnosis from Referral: M17.11 (ICD-10-CM) - Unilateral primary osteoarthritis, right knee   Evaluation Date: 6/19/2023  Authorization Period Expiration: 6-19-24  Plan of Care Expiration: 9-30-23  Visit # / Visits authorized: 2/ 10     Time In: 8:30 am  Time Out: 9:20 am  Total Billable Time: 40 minutes    Precautions: Standard    Subjective     Pt reports: R knee feels OK.  He was compliant with home exercise program.  Response to previous treatment: na  Functional change: na    Pain: 0/10  Location: right knee      Objective     Lacks TKE.  GOOD HEP recall.    Peng received therapeutic exercises to develop strength, ROM, flexibility, posture, and core stabilization for 40 minutes including:  Bike x 8 min  Pat MOBS/PROM x 5 min  Standing GTB TKE x 25 (all exercises)  Standing GTB hip abd and ext  GTB bridges  GTB clamshells  SLR with 3#  SAQ with 3#  Shuttle LP with 3.5 bands  Shuttle SLP with 2.5 bands  HSS 5 x 30 sec hold  Lateral step ups  HEP review    Peng participated in neuromuscular re-education activities to improve:       Peng received cold pack for 8 min minutes to R knee.      Home Exercises Provided and Patient Education Provided     Education provided:   - ice for pain    Written Home Exercises Provided: Patient instructed to cont prior HEP.  Exercises were reviewed and Peng was able to demonstrate them prior to the end of the session.  Peng demonstrated good  understanding of the education provided.     See EMR under MEDIA for exercises provided prior visit.    Assessment     Tolerated exercises well without c/o pain.  Peng Is progressing well towards his goals.   Pt prognosis is Good.     Pt will continue to benefit from  skilled outpatient physical therapy to address the deficits listed in the problem list box on initial evaluation, provide pt/family education and to maximize pt's level of independence in the home and community environment.     Pt's spiritual, cultural and educational needs considered and pt agreeable to plan of care and goals.    Anticipated barriers to physical therapy: none    Goals: Short Term Goals: 2 weeks         1.   Independent with HEP        2.  Pt will report decreased pain level of < 50% from above measure with ADL     Long Term Goals:   GOALS:    8_   weeks. Pt agrees with goals set.  Independent with HEP.  Report decreased    R knee    pain  <   / =  3/10 with ADL such as walking > 30 min  Increased MMT  for  R LE to 4+/5 to 5/5  with ADL such as stair climbing  Increased arom  for  R knee to -2 deg ext to 125 deg flexion with functional activities such walking or self-care       Plan     Continue PT per POC.    Greg Temple, PT

## 2023-07-03 ENCOUNTER — CLINICAL SUPPORT (OUTPATIENT)
Dept: REHABILITATION | Facility: HOSPITAL | Age: 78
End: 2023-07-03
Attending: PODIATRIST
Payer: MEDICARE

## 2023-07-03 DIAGNOSIS — R52 PAIN: Primary | ICD-10-CM

## 2023-07-03 PROCEDURE — 97110 THERAPEUTIC EXERCISES: CPT | Mod: HCNC | Performed by: PHYSICAL THERAPIST

## 2023-07-03 NOTE — PROGRESS NOTES
Physical Therapy Daily Treatment Note     Name: Peng CHAVEZ [facility] Number: [id number]    Therapy Diagnosis:   Encounter Diagnosis   Name Primary?    Pain Yes     Physician: Karl Joseph Jr., *    Visit Date: 7/3/2023  Physician Orders: PT Eval and Treat   Medical Diagnosis from Referral: M17.11 (ICD-10-CM) - Unilateral primary osteoarthritis, right knee   Evaluation Date: 6/19/2023  Authorization Period Expiration: 6-19-24  Plan of Care Expiration: 9-30-23  Visit # / Visits authorized: 3/ 10     Time In: 10:40 am  Time Out: 11:30 am  Total Billable Time: 40 minutes    Precautions: Standard    Subjective     Pt reports: R knee feels pretty good.  He was compliant with home exercise program.  Response to previous treatment: less pain  Functional change: improved mobility    Pain: 0/10  Location: right knee      Objective     Still lacks TKE.  No swelling.  Mild knee flexion gait noted.    Peng received therapeutic exercises to develop strength, ROM, flexibility, posture, and core stabilization for 40 minutes including:  Bike x 8 min  Pat MOBS/PROM x 5 min  Standing GTB TKE x 25 (all exercises)  Standing GTB hip abd and ext  GTB bridges  GTB clamshells  SLR with 3#  SAQ with 3#  Shuttle LP with 3.5 bands  Shuttle SLP with 2.5 bands  HSS 5 x 30 sec hold  Lateral step ups  HEP review    Peng participated in neuromuscular re-education activities to improve:       Peng received cold pack for 8 min minutes to R knee.      Home Exercises Provided and Patient Education Provided     Education provided:   - ice for pain    Written Home Exercises Provided: Patient instructed to cont prior HEP.  Exercises were reviewed and Peng was able to demonstrate them prior to the end of the session.  Peng demonstrated good  understanding of the education provided.     See EMR under MEDIA for exercises provided prior visit.    Assessment     Tolerated exercises well without c/o pain.  Peng Is progressing well towards his  goals.   Pt prognosis is Good.     Pt will continue to benefit from skilled outpatient physical therapy to address the deficits listed in the problem list box on initial evaluation, provide pt/family education and to maximize pt's level of independence in the home and community environment.     Pt's spiritual, cultural and educational needs considered and pt agreeable to plan of care and goals.    Anticipated barriers to physical therapy: none    Goals: Short Term Goals: 2 weeks         1.   Independent with HEP        2.  Pt will report decreased pain level of < 50% from above measure with ADL     Long Term Goals:   GOALS:    8_   weeks. Pt agrees with goals set.  Independent with HEP.  Report decreased    R knee    pain  <   / =  3/10 with ADL such as walking > 30 min  Increased MMT  for  R LE to 4+/5 to 5/5  with ADL such as stair climbing  Increased arom  for  R knee to -2 deg ext to 125 deg flexion with functional activities such walking or self-care       Plan     Continue PT per POC.    Greg Temple, PT

## 2023-07-07 ENCOUNTER — CLINICAL SUPPORT (OUTPATIENT)
Dept: REHABILITATION | Facility: HOSPITAL | Age: 78
End: 2023-07-07
Attending: PODIATRIST
Payer: MEDICARE

## 2023-07-07 DIAGNOSIS — R52 PAIN: Primary | ICD-10-CM

## 2023-07-07 PROCEDURE — 97110 THERAPEUTIC EXERCISES: CPT | Mod: HCNC | Performed by: PHYSICAL THERAPIST

## 2023-07-07 NOTE — PROGRESS NOTES
Physical Therapy Daily Treatment Note     Name: Peng CHAVEZ [facility] Number: [id number]    Therapy Diagnosis:   Encounter Diagnosis   Name Primary?    Pain Yes     Physician: Karl Joseph Jr., *    Visit Date: 7/7/2023  Physician Orders: PT Eval and Treat   Medical Diagnosis from Referral: M17.11 (ICD-10-CM) - Unilateral primary osteoarthritis, right knee   Evaluation Date: 6/19/2023  Authorization Period Expiration: 6-19-24  Plan of Care Expiration: 9-30-23  Visit # / Visits authorized: 4/ 10     Time In: 9:10 am  Time Out: 10:05 am  Total Billable Time: 40 minutes    Precautions: Standard    Subjective     Pt reports: R knee feels better with only occasional pain.  He was compliant with home exercise program.  Response to previous treatment: less pain  Functional change: improved mobility    Pain: 0/10  Location: right knee      Objective     Still lacks TKE.  No swelling.  MIN knee flexion gait noted.    Peng received therapeutic exercises to develop strength, ROM, flexibility, posture, and core stabilization for 40 minutes including:  Bike x 8 min  Pat MOBS/PROM x 5 min  Standing GTB TKE x 25 (all exercises)  Standing GTB hip abd and ext  GTB bridges  GTB clamshells  SLR with 3#  SAQ with 3#  Shuttle LP with 4.5 bands  Shuttle SLP with 3.5 bands  HSS 5 x 30 sec hold  Lateral step ups  HEP review    Peng participated in neuromuscular re-education activities to improve:       Peng received cold pack for 10 min minutes to R knee.      Home Exercises Provided and Patient Education Provided     Education provided:   - ice for pain    Written Home Exercises Provided: Patient instructed to cont prior HEP.  Exercises were reviewed and Peng was able to demonstrate them prior to the end of the session.  Peng demonstrated good  understanding of the education provided.     See EMR under MEDIA for exercises provided prior visit.    Assessment     Tolerated exercises well without c/o pain.  Peng Is progressing  well towards his goals.   Pt prognosis is Good.     Pt will continue to benefit from skilled outpatient physical therapy to address the deficits listed in the problem list box on initial evaluation, provide pt/family education and to maximize pt's level of independence in the home and community environment.     Pt's spiritual, cultural and educational needs considered and pt agreeable to plan of care and goals.    Anticipated barriers to physical therapy: none    Goals: Short Term Goals: 2 weeks         1.   Independent with HEP        2.  Pt will report decreased pain level of < 50% from above measure with ADL     Long Term Goals:   GOALS:    8_   weeks. Pt agrees with goals set.  Independent with HEP.  Report decreased    R knee    pain  <   / =  3/10 with ADL such as walking > 30 min  Increased MMT  for  R LE to 4+/5 to 5/5  with ADL such as stair climbing  Increased arom  for  R knee to -2 deg ext to 125 deg flexion with functional activities such walking or self-care       Plan     Continue PT per POC.    Greg Temple, PT

## 2023-07-10 ENCOUNTER — CLINICAL SUPPORT (OUTPATIENT)
Dept: REHABILITATION | Facility: HOSPITAL | Age: 78
End: 2023-07-10
Attending: PODIATRIST
Payer: MEDICARE

## 2023-07-10 DIAGNOSIS — R52 PAIN: Primary | ICD-10-CM

## 2023-07-10 PROCEDURE — 97110 THERAPEUTIC EXERCISES: CPT | Mod: HCNC | Performed by: PHYSICAL THERAPIST

## 2023-07-10 NOTE — PROGRESS NOTES
Physical Therapy Daily Treatment Note     Name: Peng CHAVEZ [facility] Number: [id number]    Therapy Diagnosis:   Encounter Diagnosis   Name Primary?    Pain Yes     Physician: Karl Joseph Jr., *    Visit Date: 7/10/2023  Physician Orders: PT Eval and Treat   Medical Diagnosis from Referral: M17.11 (ICD-10-CM) - Unilateral primary osteoarthritis, right knee   Evaluation Date: 6/19/2023  Authorization Period Expiration: 6-19-24  Plan of Care Expiration: 9-30-23  Visit # / Visits authorized: 5/ 10     Time In: 12:50 pm  Time Out: 1:40 pm  Total Billable Time: 40 minutes    Precautions: Standard    Subjective     Pt reports: R knee feels better.  He was compliant with home exercise program.  Response to previous treatment: less pain  Functional change: improved mobility    Pain: 0/10  Location: right knee      Objective     Still lacks TKE.  No swelling.  MIN knee flexion gait noted.  QS ability increased.    Peng received therapeutic exercises to develop strength, ROM, flexibility, posture, and core stabilization for 40 minutes including:  Bike x 8 min  Pat MOBS/PROM x 5 min  Standing GTB TKE x 25 (all exercises)  Standing GTB hip abd and ext  GTB bridges  GTB clamshells  SLR with 3#  SAQ with 3#  Shuttle LP with 4.5 bands  Shuttle SLP with 3.5 bands  HSS 5 x 30 sec hold  Lateral step ups  Theraball mini squats  HEP review    Peng participated in neuromuscular re-education activities to improve:       Peng received cold pack for 10 min minutes to R knee.      Home Exercises Provided and Patient Education Provided     Education provided:   - ice for pain    Written Home Exercises Provided: Patient instructed to cont prior HEP.  Exercises were reviewed and Peng was able to demonstrate them prior to the end of the session.  Peng demonstrated good  understanding of the education provided.     See EMR under MEDIA for exercises provided prior visit.    Assessment     Tolerated exercises well without c/o  pain.  Peng Is progressing well towards his goals.   Pt prognosis is Good.     Pt will continue to benefit from skilled outpatient physical therapy to address the deficits listed in the problem list box on initial evaluation, provide pt/family education and to maximize pt's level of independence in the home and community environment.     Pt's spiritual, cultural and educational needs considered and pt agreeable to plan of care and goals.    Anticipated barriers to physical therapy: none    Goals: Short Term Goals: 2 weeks         1.   Independent with HEP        2.  Pt will report decreased pain level of < 50% from above measure with ADL     Long Term Goals:   GOALS:    8_   weeks. Pt agrees with goals set.  Independent with HEP.  Report decreased    R knee    pain  <   / =  3/10 with ADL such as walking > 30 min  Increased MMT  for  R LE to 4+/5 to 5/5  with ADL such as stair climbing  Increased arom  for  R knee to -2 deg ext to 125 deg flexion with functional activities such walking or self-care       Plan     Continue PT per POC.    Greg Temple, PT

## 2023-07-12 ENCOUNTER — CLINICAL SUPPORT (OUTPATIENT)
Dept: REHABILITATION | Facility: HOSPITAL | Age: 78
End: 2023-07-12
Attending: PODIATRIST
Payer: MEDICARE

## 2023-07-12 DIAGNOSIS — R52 PAIN: Primary | ICD-10-CM

## 2023-07-12 PROCEDURE — 97110 THERAPEUTIC EXERCISES: CPT | Mod: HCNC | Performed by: PHYSICAL THERAPIST

## 2023-07-12 NOTE — PROGRESS NOTES
Physical Therapy Daily Treatment Note     Name: Peng CHAVEZ [facility] Number: [id number]    Therapy Diagnosis:   Encounter Diagnosis   Name Primary?    Pain Yes     Physician: Kral Joseph Jr., *    Visit Date: 7/12/2023  Physician Orders: PT Eval and Treat   Medical Diagnosis from Referral: M17.11 (ICD-10-CM) - Unilateral primary osteoarthritis, right knee   Evaluation Date: 6/19/2023  Authorization Period Expiration: 6-19-24  Plan of Care Expiration: 9-30-23  Visit # / Visits authorized: 6/ 10     Time In: 6:40 am  Time Out: 7:30 am  Total Billable Time: 40 minutes    Precautions: Standard    Subjective     Pt reports: R knee feels better overall.  He was compliant with home exercise program.  Response to previous treatment: less pain  Functional change: improved mobility    Pain: 0/10  Location: right knee      Objective     Still lacks TKE.  No swelling.  MIN knee flexion gait noted.  QS ability increased.    Peng received therapeutic exercises to develop strength, ROM, flexibility, posture, and core stabilization for 40 minutes including:  Bike x 8 min  Pat MOBS/PROM x 5 min  Standing GTB TKE x 25 (all exercises)  Standing GTB hip abd and ext  GTB bridges  GTB clamshells  SLR with 3#  SAQ with 3#  Shuttle LP with 4.5 bands  Shuttle SLP with 3.5 bands  HSS 5 x 30 sec hold  Lateral step ups  Theraball mini squats  HEP review    Peng participated in neuromuscular re-education activities to improve:       Peng received cold pack for 10 min minutes to R knee.      Home Exercises Provided and Patient Education Provided     Education provided:   - ice for pain    Written Home Exercises Provided: Patient instructed to cont prior HEP.  Exercises were reviewed and Peng was able to demonstrate them prior to the end of the session.  Peng demonstrated good  understanding of the education provided.     See EMR under MEDIA for exercises provided prior visit.    Assessment     Tolerated exercises well without c/o  pain.  ADL improved per pt.  Still exhibits lack of TKE.  Peng Is progressing well towards his goals.   Pt prognosis is Good.     Pt will continue to benefit from skilled outpatient physical therapy to address the deficits listed in the problem list box on initial evaluation, provide pt/family education and to maximize pt's level of independence in the home and community environment.     Pt's spiritual, cultural and educational needs considered and pt agreeable to plan of care and goals.    Anticipated barriers to physical therapy: none    Goals: Short Term Goals: 2 weeks         1.   Independent with HEP        2.  Pt will report decreased pain level of < 50% from above measure with ADL     Long Term Goals:   GOALS:    8_   weeks. Pt agrees with goals set.  Independent with HEP.  Report decreased    R knee    pain  <   / =  3/10 with ADL such as walking > 30 min  Increased MMT  for  R LE to 4+/5 to 5/5  with ADL such as stair climbing  Increased arom  for  R knee to -2 deg ext to 125 deg flexion with functional activities such walking or self-care       Plan     Continue PT per POC.    Greg Temple, PT

## 2023-07-19 ENCOUNTER — CLINICAL SUPPORT (OUTPATIENT)
Dept: REHABILITATION | Facility: HOSPITAL | Age: 78
End: 2023-07-19
Attending: PODIATRIST
Payer: MEDICARE

## 2023-07-19 DIAGNOSIS — R52 PAIN: Primary | ICD-10-CM

## 2023-07-19 PROCEDURE — 97110 THERAPEUTIC EXERCISES: CPT | Mod: HCNC | Performed by: PHYSICAL THERAPIST

## 2023-07-19 NOTE — PROGRESS NOTES
Physical Therapy Daily Treatment Note     Name: Peng CHAVEZ [facility] Number: [id number]    Therapy Diagnosis:   Encounter Diagnosis   Name Primary?    Pain Yes     Physician: Karl Joseph Jr., *    Visit Date: 7/19/2023  Physician Orders: PT Eval and Treat   Medical Diagnosis from Referral: M17.11 (ICD-10-CM) - Unilateral primary osteoarthritis, right knee   Evaluation Date: 6/19/2023  Authorization Period Expiration: 6-19-24  Plan of Care Expiration: 9-30-23  Visit # / Visits authorized: 7/ 10     Time In: 8:10 am  Time Out: 9:00 am  Total Billable Time: 40 minutes    Precautions: Standard    Subjective     Pt reports: R knee feels a little sore from walking a lot this past weekend.  He was compliant with home exercise program.  Response to previous treatment: less pain  Functional change: improved mobility    Pain: 0/10  Location: right knee      Objective     Still lacks TKE.  MIN swelling.  MIN knee flexion gait noted.  QS ability increased.    Peng received therapeutic exercises to develop strength, ROM, flexibility, posture, and core stabilization for 40 minutes including:  Bike x 8 min  Pat MOBS/PROM x 5 min  Standing GTB TKE x 25 (all exercises)  Standing GTB hip abd and ext  GTB bridges  GTB clamshells  SLR with 3#  SAQ with 3#  Shuttle LP with 4.5 bands  Shuttle SLP with 3.5 bands  HSS 5 x 30 sec hold  Lateral step ups  Theraball mini squats  HEP review    Peng participated in neuromuscular re-education activities to improve:       Peng received cold pack for 10 min minutes to R knee.      Home Exercises Provided and Patient Education Provided     Education provided:   - ice for pain    Written Home Exercises Provided: Patient instructed to cont prior HEP.  Exercises were reviewed and Peng was able to demonstrate them prior to the end of the session.  Peng demonstrated good  understanding of the education provided.     See EMR under MEDIA for exercises provided prior visit.    Assessment      Tolerated exercises well without c/o pain.  Still exhibits lack of TKE.  Peng Is progressing well towards his goals.   Pt prognosis is Good.     Pt will continue to benefit from skilled outpatient physical therapy to address the deficits listed in the problem list box on initial evaluation, provide pt/family education and to maximize pt's level of independence in the home and community environment.     Pt's spiritual, cultural and educational needs considered and pt agreeable to plan of care and goals.    Anticipated barriers to physical therapy: none    Goals: Short Term Goals: 2 weeks         1.   Independent with HEP        2.  Pt will report decreased pain level of < 50% from above measure with ADL     Long Term Goals:   GOALS:    8_   weeks. Pt agrees with goals set.  Independent with HEP.  Report decreased    R knee    pain  <   / =  3/10 with ADL such as walking > 30 min  Increased MMT  for  R LE to 4+/5 to 5/5  with ADL such as stair climbing  Increased arom  for  R knee to -2 deg ext to 125 deg flexion with functional activities such walking or self-care       Plan     Continue PT per POC.    Greg Temple, PT

## 2023-07-21 ENCOUNTER — CLINICAL SUPPORT (OUTPATIENT)
Dept: REHABILITATION | Facility: HOSPITAL | Age: 78
End: 2023-07-21
Attending: PODIATRIST
Payer: MEDICARE

## 2023-07-21 DIAGNOSIS — R52 PAIN: Primary | ICD-10-CM

## 2023-07-21 PROCEDURE — 97110 THERAPEUTIC EXERCISES: CPT | Mod: HCNC | Performed by: PHYSICAL THERAPIST

## 2023-07-21 NOTE — PROGRESS NOTES
Physical Therapy Daily Treatment Note     Name: Peng CHAVEZ [facility] Number: [id number]    Therapy Diagnosis:   Encounter Diagnosis   Name Primary?    Pain Yes     Physician: Karl Joseph Jr., *    Visit Date: 7/21/2023  Physician Orders: PT Eval and Treat   Medical Diagnosis from Referral: M17.11 (ICD-10-CM) - Unilateral primary osteoarthritis, right knee   Evaluation Date: 6/19/2023  Authorization Period Expiration: 6-19-24  Plan of Care Expiration: 9-30-23  Visit # / Visits authorized: 8/ 10     Time In: 8:20 am  Time Out: 9:10 am  Total Billable Time: 40 minutes    Precautions: Standard    Subjective     Pt reports: R knee feels OK.  Pt c/o much upper thigh cramping 2 days ago after cutting grass.  He was compliant with home exercise program.  Response to previous treatment: less pain  Functional change: improved mobility    Pain: 0/10  Location: right knee      Objective     Still lacks TKE.  MIN swelling.  MIN knee flexion gait noted.  QS ability increased.  MIN TTP hip flexors which are tight.    Peng received therapeutic exercises to develop strength, ROM, flexibility, posture, and core stabilization for 40 minutes including:  Bike x 8 min  Pat MOBS/PROM x 5 min  Standing GTB TKE x 25 (all exercises)  Standing GTB hip abd and ext  GTB bridges  GTB clamshells  SLR with 3#  NP  SAQ with 3#  NP  Shuttle LP with 3 bands   Shuttle SLP with 2 bands  HSS 5 x 30 sec hold  Prone quad stretch x 3 min  EOT hip flexor stretch x 3 min  Lateral step ups  NP  Theraball mini squats  HEP review    Peng participated in neuromuscular re-education activities to improve:       Peng received cold pack for 10 min minutes to R knee.      Home Exercises Provided and Patient Education Provided     Education provided:   - ice for pain    Written Home Exercises Provided: Patient instructed to cont prior HEP.  Exercises were reviewed and Peng was able to demonstrate them prior to the end of the session.  Peng demonstrated  good  understanding of the education provided.     See EMR under MEDIA for exercises provided prior visit.    Assessment     Tolerated exercises well without c/o pain.  Tolerated hip flexor stretching well.  Peng Is progressing well towards his goals.   Pt prognosis is Good.     Pt will continue to benefit from skilled outpatient physical therapy to address the deficits listed in the problem list box on initial evaluation, provide pt/family education and to maximize pt's level of independence in the home and community environment.     Pt's spiritual, cultural and educational needs considered and pt agreeable to plan of care and goals.    Anticipated barriers to physical therapy: none    Goals: Short Term Goals: 2 weeks         1.   Independent with HEP        2.  Pt will report decreased pain level of < 50% from above measure with ADL     Long Term Goals:   GOALS:    8_   weeks. Pt agrees with goals set.  Independent with HEP.  Report decreased    R knee    pain  <   / =  3/10 with ADL such as walking > 30 min  Increased MMT  for  R LE to 4+/5 to 5/5  with ADL such as stair climbing  Increased arom  for  R knee to -2 deg ext to 125 deg flexion with functional activities such walking or self-care       Plan     Continue PT per POC.    Greg Temple, PT

## 2023-07-24 ENCOUNTER — CLINICAL SUPPORT (OUTPATIENT)
Dept: REHABILITATION | Facility: HOSPITAL | Age: 78
End: 2023-07-24
Attending: PODIATRIST
Payer: MEDICARE

## 2023-07-24 DIAGNOSIS — R52 PAIN: Primary | ICD-10-CM

## 2023-07-24 PROCEDURE — 97110 THERAPEUTIC EXERCISES: CPT | Mod: HCNC | Performed by: PHYSICAL THERAPIST

## 2023-07-24 NOTE — PROGRESS NOTES
Physical Therapy Daily Treatment Note     Name: Peng CHAVEZ [facility] Number: [id number]    Therapy Diagnosis:   Encounter Diagnosis   Name Primary?    Pain Yes     Physician: Karl Joseph Jr., *    Visit Date: 7/24/2023  Physician Orders: PT Eval and Treat   Medical Diagnosis from Referral: M17.11 (ICD-10-CM) - Unilateral primary osteoarthritis, right knee   Evaluation Date: 6/19/2023  Authorization Period Expiration: 6-19-24  Plan of Care Expiration: 9-30-23  Visit # / Visits authorized: 9/ 10     Time In: 7:00 am  Time Out: 7:50 am  Total Billable Time: 40 minutes    Precautions: Standard    Subjective     Pt reports: R knee feels better.  Pt states no upper thigh cramping since last week.  He was compliant with home exercise program.  Response to previous treatment: less pain  Functional change: improved mobility    Pain: 0/10  Location: right knee      Objective     Still lacks TKE.  MIN swelling.  MIN knee flexion gait noted.  QS ability increased.  MIN TTP hip flexors which are tight.    Peng received therapeutic exercises to develop strength, ROM, flexibility, posture, and core stabilization for 40 minutes including:  Bike x 8 min  Pat MOBS/PROM x 5 min  Standing GTB TKE x 25 (all exercises)  Standing GTB hip abd and ext  GTB bridges  GTB clamshells  SLR with 4#    SAQ with 4#    Hams curls with 4#  Shuttle LP with 4 bands   Shuttle SLP with 3 bands  HSS 5 x 30 sec hold  Prone quad stretch x 3 min  EOT hip flexor stretch x 3 min  Lateral step ups    Theraball mini squats  HEP review    Peng participated in neuromuscular re-education activities to improve:       Peng received cold pack for 10 min minutes to R knee.      Home Exercises Provided and Patient Education Provided     Education provided:   - ice for pain    Written Home Exercises Provided: Patient instructed to cont prior HEP.  Exercises were reviewed and Peng was able to demonstrate them prior to the end of the session.  Peng  demonstrated good  understanding of the education provided.     See EMR under MEDIA for exercises provided prior visit.    Assessment     Tolerated exercises well without c/o pain.    Peng Is progressing well towards his goals.   Pt prognosis is Good.     Pt will continue to benefit from skilled outpatient physical therapy to address the deficits listed in the problem list box on initial evaluation, provide pt/family education and to maximize pt's level of independence in the home and community environment.     Pt's spiritual, cultural and educational needs considered and pt agreeable to plan of care and goals.    Anticipated barriers to physical therapy: none    Goals: Short Term Goals: 2 weeks         1.   Independent with HEP        2.  Pt will report decreased pain level of < 50% from above measure with ADL     Long Term Goals:   GOALS:    8_   weeks. Pt agrees with goals set.  Independent with HEP.  Report decreased    R knee    pain  <   / =  3/10 with ADL such as walking > 30 min  Increased MMT  for  R LE to 4+/5 to 5/5  with ADL such as stair climbing  Increased arom  for  R knee to -2 deg ext to 125 deg flexion with functional activities such walking or self-care       Plan     Continue PT per POC.    Greg Temple, PT

## 2023-07-26 ENCOUNTER — CLINICAL SUPPORT (OUTPATIENT)
Dept: REHABILITATION | Facility: HOSPITAL | Age: 78
End: 2023-07-26
Attending: PODIATRIST
Payer: MEDICARE

## 2023-07-26 DIAGNOSIS — R52 PAIN: Primary | ICD-10-CM

## 2023-07-26 PROCEDURE — 97110 THERAPEUTIC EXERCISES: CPT | Mod: HCNC | Performed by: PHYSICAL THERAPIST

## 2023-07-26 NOTE — PROGRESS NOTES
Physical Therapy Daily Treatment Note     Name: Peng CHAVEZ [facility] Number: [id number]    Therapy Diagnosis:   Encounter Diagnosis   Name Primary?    Pain Yes     Physician: Karl Joseph Jr., *    Visit Date: 7/26/2023  Physician Orders: PT Eval and Treat   Medical Diagnosis from Referral: M17.11 (ICD-10-CM) - Unilateral primary osteoarthritis, right knee   Evaluation Date: 6/19/2023  Authorization Period Expiration: 6-19-24  Plan of Care Expiration: 9-30-23  Visit # / Visits authorized: 10/ 10     Time In: 8:15 am  Time Out: 9:05 am  Total Billable Time: 40 minutes    Precautions: Standard    Subjective     Pt reports: R knee feels pretty good.   He was compliant with home exercise program.  Response to previous treatment: less pain  Functional change: improved mobility    Pain: 0/10  Location: right knee      Objective     Still lacks TKE.  MIN swelling.  MIN knee flexion gait noted.  QS ability increased.  MIN TTP hip flexors which are tight.    Peng received therapeutic exercises to develop strength, ROM, flexibility, posture, and core stabilization for 40 minutes including:  Bike x 8 min  Pat MOBS/PROM x 5 min  Standing GTB TKE x 25 (all exercises)  Standing GTB hip abd and ext  GTB bridges  GTB clamshells  SLR with 4#    SAQ with 4#    Hams curls with 4#  Shuttle LP with 4 bands   Shuttle SLP with 3 bands  HSS 5 x 30 sec hold  Prone quad stretch x 3 min  EOT hip flexor stretch x 3 min  Lateral step ups    Theraball mini squats  HEP review    Peng participated in neuromuscular re-education activities to improve:       Peng received cold pack for 10 min minutes to R knee.      Home Exercises Provided and Patient Education Provided     Education provided:   - ice for pain    Written Home Exercises Provided: Patient instructed to cont prior HEP.  Exercises were reviewed and Peng was able to demonstrate them prior to the end of the session.  Peng demonstrated good  understanding of the education  provided.     See EMR under MEDIA for exercises provided prior visit.    Assessment     Tolerated exercises well without c/o pain.    Peng Is progressing well towards his goals.   Pt prognosis is Good.     Pt will continue to benefit from skilled outpatient physical therapy to address the deficits listed in the problem list box on initial evaluation, provide pt/family education and to maximize pt's level of independence in the home and community environment.     Pt's spiritual, cultural and educational needs considered and pt agreeable to plan of care and goals.    Anticipated barriers to physical therapy: none    Goals: Short Term Goals: 2 weeks         1.   Independent with HEP        2.  Pt will report decreased pain level of < 50% from above measure with ADL     Long Term Goals:   GOALS:    8_   weeks. Pt agrees with goals set.  Independent with HEP.  Report decreased    R knee    pain  <   / =  3/10 with ADL such as walking > 30 min  Increased MMT  for  R LE to 4+/5 to 5/5  with ADL such as stair climbing  Increased arom  for  R knee to -2 deg ext to 125 deg flexion with functional activities such walking or self-care       Plan     Continue PT per POC.    Greg Temple, PT

## 2023-07-31 ENCOUNTER — PATIENT MESSAGE (OUTPATIENT)
Dept: UROLOGY | Facility: CLINIC | Age: 78
End: 2023-07-31
Payer: MEDICARE

## 2023-07-31 ENCOUNTER — HOSPITAL ENCOUNTER (EMERGENCY)
Facility: HOSPITAL | Age: 78
Discharge: HOME | End: 2023-07-31
Attending: EMERGENCY MEDICINE
Payer: MEDICARE

## 2023-07-31 ENCOUNTER — CLINICAL SUPPORT (OUTPATIENT)
Dept: REHABILITATION | Facility: HOSPITAL | Age: 78
End: 2023-07-31
Attending: PODIATRIST
Payer: MEDICARE

## 2023-07-31 ENCOUNTER — NURSE TRIAGE (OUTPATIENT)
Dept: ADMINISTRATIVE | Facility: CLINIC | Age: 78
End: 2023-07-31
Payer: MEDICARE

## 2023-07-31 VITALS
RESPIRATION RATE: 20 BRPM | HEIGHT: 71 IN | DIASTOLIC BLOOD PRESSURE: 72 MMHG | SYSTOLIC BLOOD PRESSURE: 159 MMHG | WEIGHT: 245 LBS | TEMPERATURE: 98 F | OXYGEN SATURATION: 100 % | BODY MASS INDEX: 34.3 KG/M2 | HEART RATE: 100 BPM

## 2023-07-31 DIAGNOSIS — R52 PAIN: Primary | ICD-10-CM

## 2023-07-31 DIAGNOSIS — R31.0 GROSS HEMATURIA: Primary | ICD-10-CM

## 2023-07-31 LAB
ALBUMIN SERPL BCP-MCNC: 4.1 G/DL (ref 3.5–5.2)
ALP SERPL-CCNC: 71 U/L (ref 55–135)
ALT SERPL W/O P-5'-P-CCNC: 23 U/L (ref 10–44)
ANION GAP SERPL CALC-SCNC: 12 MMOL/L (ref 8–16)
AST SERPL-CCNC: 21 U/L (ref 10–40)
BACTERIA #/AREA URNS AUTO: ABNORMAL /HPF
BASOPHILS # BLD AUTO: 0.05 K/UL (ref 0–0.2)
BASOPHILS NFR BLD: 0.3 % (ref 0–1.9)
BILIRUB SERPL-MCNC: 0.8 MG/DL (ref 0.1–1)
BILIRUB UR QL STRIP: NEGATIVE
BUN SERPL-MCNC: 15 MG/DL (ref 8–23)
CALCIUM SERPL-MCNC: 9.3 MG/DL (ref 8.7–10.5)
CHLORIDE SERPL-SCNC: 102 MMOL/L (ref 95–110)
CLARITY UR REFRACT.AUTO: ABNORMAL
CO2 SERPL-SCNC: 25 MMOL/L (ref 23–29)
COLOR UR AUTO: ABNORMAL
CREAT SERPL-MCNC: 0.9 MG/DL (ref 0.5–1.4)
DIFFERENTIAL METHOD: ABNORMAL
EOSINOPHIL # BLD AUTO: 0.1 K/UL (ref 0–0.5)
EOSINOPHIL NFR BLD: 0.7 % (ref 0–8)
ERYTHROCYTE [DISTWIDTH] IN BLOOD BY AUTOMATED COUNT: 12.1 % (ref 11.5–14.5)
EST. GFR  (NO RACE VARIABLE): >60 ML/MIN/1.73 M^2
GLUCOSE SERPL-MCNC: 93 MG/DL (ref 70–110)
GLUCOSE UR QL STRIP: NEGATIVE
HCT VFR BLD AUTO: 46.2 % (ref 40–54)
HGB BLD-MCNC: 15.6 G/DL (ref 14–18)
HGB UR QL STRIP: ABNORMAL
HYALINE CASTS UR QL AUTO: 0 /LPF
IMM GRANULOCYTES # BLD AUTO: 0.07 K/UL (ref 0–0.04)
IMM GRANULOCYTES NFR BLD AUTO: 0.4 % (ref 0–0.5)
KETONES UR QL STRIP: NEGATIVE
LEUKOCYTE ESTERASE UR QL STRIP: ABNORMAL
LYMPHOCYTES # BLD AUTO: 1.5 K/UL (ref 1–4.8)
LYMPHOCYTES NFR BLD: 8.1 % (ref 18–48)
MCH RBC QN AUTO: 31 PG (ref 27–31)
MCHC RBC AUTO-ENTMCNC: 33.8 G/DL (ref 32–36)
MCV RBC AUTO: 92 FL (ref 82–98)
MICROSCOPIC COMMENT: ABNORMAL
MONOCYTES # BLD AUTO: 1.3 K/UL (ref 0.3–1)
MONOCYTES NFR BLD: 7.2 % (ref 4–15)
NEUTROPHILS # BLD AUTO: 15.1 K/UL (ref 1.8–7.7)
NEUTROPHILS NFR BLD: 83.3 % (ref 38–73)
NITRITE UR QL STRIP: NEGATIVE
NRBC BLD-RTO: 0 /100 WBC
PH UR STRIP: 8 [PH] (ref 5–8)
PLATELET # BLD AUTO: 213 K/UL (ref 150–450)
PMV BLD AUTO: 12 FL (ref 9.2–12.9)
POTASSIUM SERPL-SCNC: 3.9 MMOL/L (ref 3.5–5.1)
PROT SERPL-MCNC: 7.1 G/DL (ref 6–8.4)
PROT UR QL STRIP: ABNORMAL
RBC # BLD AUTO: 5.04 M/UL (ref 4.6–6.2)
RBC #/AREA URNS AUTO: >100 /HPF (ref 0–4)
SODIUM SERPL-SCNC: 139 MMOL/L (ref 136–145)
SP GR UR STRIP: 1.02 (ref 1–1.03)
URN SPEC COLLECT METH UR: ABNORMAL
WBC # BLD AUTO: 18.11 K/UL (ref 3.9–12.7)
WBC #/AREA URNS AUTO: 20 /HPF (ref 0–5)

## 2023-07-31 PROCEDURE — 25000003 PHARM REV CODE 250: Mod: HCNC | Performed by: EMERGENCY MEDICINE

## 2023-07-31 PROCEDURE — 80053 COMPREHEN METABOLIC PANEL: CPT | Mod: HCNC | Performed by: NURSE PRACTITIONER

## 2023-07-31 PROCEDURE — 87086 URINE CULTURE/COLONY COUNT: CPT | Mod: HCNC | Performed by: NURSE PRACTITIONER

## 2023-07-31 PROCEDURE — 87088 URINE BACTERIA CULTURE: CPT | Mod: HCNC | Performed by: NURSE PRACTITIONER

## 2023-07-31 PROCEDURE — 87186 SC STD MICRODIL/AGAR DIL: CPT | Mod: HCNC | Performed by: NURSE PRACTITIONER

## 2023-07-31 PROCEDURE — 99284 EMERGENCY DEPT VISIT MOD MDM: CPT | Mod: HCNC,25

## 2023-07-31 PROCEDURE — 96360 HYDRATION IV INFUSION INIT: CPT | Mod: HCNC

## 2023-07-31 PROCEDURE — 97110 THERAPEUTIC EXERCISES: CPT | Mod: HCNC | Performed by: PHYSICAL THERAPIST

## 2023-07-31 PROCEDURE — 87077 CULTURE AEROBIC IDENTIFY: CPT | Mod: HCNC | Performed by: NURSE PRACTITIONER

## 2023-07-31 PROCEDURE — 81001 URINALYSIS AUTO W/SCOPE: CPT | Mod: HCNC | Performed by: NURSE PRACTITIONER

## 2023-07-31 PROCEDURE — 85025 COMPLETE CBC W/AUTO DIFF WBC: CPT | Mod: HCNC | Performed by: NURSE PRACTITIONER

## 2023-07-31 RX ORDER — SULFAMETHOXAZOLE AND TRIMETHOPRIM 800; 160 MG/1; MG/1
1 TABLET ORAL
Status: COMPLETED | OUTPATIENT
Start: 2023-07-31 | End: 2023-07-31

## 2023-07-31 RX ORDER — SULFAMETHOXAZOLE AND TRIMETHOPRIM 800; 160 MG/1; MG/1
1 TABLET ORAL 2 TIMES DAILY
Qty: 14 TABLET | Refills: 0 | Status: SHIPPED | OUTPATIENT
Start: 2023-07-31 | End: 2023-08-04 | Stop reason: ALTCHOICE

## 2023-07-31 RX ADMIN — SODIUM CHLORIDE 1000 ML: 9 INJECTION, SOLUTION INTRAVENOUS at 12:07

## 2023-07-31 RX ADMIN — SULFAMETHOXAZOLE AND TRIMETHOPRIM 1 TABLET: 800; 160 TABLET ORAL at 02:07

## 2023-07-31 NOTE — ED PROVIDER NOTES
Chief Complaint   Hematuria (Hr ago urinated yellow, burning, then started bloody urine, urinating often)      History Of Present Illness   Peng Sullivan is a 78 y.o. male presenting with hematuria that started this morning.  The patient 1st urinated yellow today, then started noticing blood.  He has noticed a couple of clots as well.  He states his urinary frequency has increased.  He notes a history of prostate cancer which is being observed.  No fever or chills.  No lower abdominal pain.        Review of patient's allergies indicates:  No Known Allergies    No current facility-administered medications on file prior to encounter.     Current Outpatient Medications on File Prior to Encounter   Medication Sig Dispense Refill    atorvastatin (LIPITOR) 10 MG tablet TAKE 1 TABLET BY MOUTH ONCE DAILY. 90 tablet 3    cholecalciferol, vitamin D3, (VITAMIN D3) 25 mcg (1,000 unit) capsule Taking every other day      hydroCHLOROthiazide (MICROZIDE) 12.5 mg capsule TAKE 1 CAPSULE BY MOUTH ONCE DAILY. 90 capsule 3    hylan g-f 20 (SYNVISC-ONE) 48 mg/6 mL Syrg 6 mLs.      losartan (COZAAR) 50 MG tablet TAKE 1 TABLET BY MOUTH ONCE DAILY. 90 tablet 3       Past History   As per HPI and below:  Past Medical History:   Diagnosis Date    Degenerative lumbar disc 03/08/2021    Diverticulosis     Hemorrhoids     High cholesterol     History of colon polyps 6/2/99, 6/3/19    Hypertension     Prostate cancer      Past Surgical History:   Procedure Laterality Date    APPENDECTOMY  01/01/1954    COLONOSCOPY  06/03/2019    [facility]    COLONOSCOPY N/A 07/19/2019    Procedure: COLONOSCOPY/pos emr;  Surgeon: Shantanu Lee MD;  Location: [address]);  Service: Endoscopy;  Laterality: N/A;  Referred by Dr. BERTO Keyes-see media tab for scanned records-      HT: 5'11, WT: 254.4, BMI: 35.5       Social History     Socioeconomic History    Marital status:    Tobacco Use    Smoking status: Former     Current  packs/day: 0.00     Average packs/day: 0.5 packs/day for 30.0 years (15.0 ttl pk-yrs)     Types: Cigarettes     Start date:      Quit date:      Years since quittin.6    Smokeless tobacco: Former     Types: Chew     Quit date:    Substance and Sexual Activity    Alcohol use: Not Currently     Comment: 12 beers per year.    Drug use: No    Sexual activity: Yes     Partners: Female     Birth control/protection: None     Social Determinants of Health     Financial Resource Strain: Low Risk  (10/3/2022)    Overall Financial Resource Strain (CARDIA)     Difficulty of Paying Living Expenses: Not hard at all   Food Insecurity: No Food Insecurity (10/3/2022)    Hunger Vital Sign     Worried About Running Out of Food in the Last Year: Never true     Ran Out of Food in the Last Year: Never true   Transportation Needs: No Transportation Needs (10/3/2022)    PRAPARE - Transportation     Lack of Transportation (Medical): No     Lack of Transportation (Non-Medical): No   Physical Activity: Sufficiently Active (10/3/2022)    Exercise Vital Sign     Days of Exercise per Week: 5 days     Minutes of Exercise per Session: 30 min   Stress: No Stress Concern Present (10/3/2022)    [employer] [place] [employer] - Occupational Stress Questionnaire     Feeling of Stress : Not at all   Social Connections: Unknown (10/3/2022)    Social Connection and Isolation Panel [NHANES]     Frequency of Communication with Friends and Family: More than three times a week     Frequency of Social Gatherings with Friends and Family: Once a week     Active Member of Clubs or Organizations: Yes     Attends Club or Organization Meetings: More than 4 times per year     Marital Status:    Housing Stability: Low Risk  (10/3/2022)    Housing Stability Vital Sign     Unable to Pay for Housing in the Last Year: No     Number of Places Lived in the Last Year: 1     Unstable Housing in the Last Year: No       Family History   Problem  "Relation Age of Onset    Heart attack Father     Heart disease Father         Congestive heart failure    No Known Problems Mother     No Known Problems Maternal Aunt     No Known Problems Maternal Uncle     No Known Problems Paternal Aunt     No Known Problems Paternal Uncle     No Known Problems Paternal Grandmother     No Known Problems Paternal Grandfather     No Known Problems Maternal Grandmother     No Known Problems Maternal Grandfather     Cataracts Sister     Colon cancer Sister     Breast cancer Sister     No Known Problems Sister     No Known Problems Sister     No Known Problems Sister     No Known Problems Brother     Amblyopia Neg Hx     Blindness Neg Hx     Diabetes Neg Hx     Glaucoma Neg Hx     Hypertension Neg Hx     Macular degeneration Neg Hx     Retinal detachment Neg Hx     Strabismus Neg Hx     Stroke Neg Hx     Thyroid disease Neg Hx        Physical Exam     Vitals:    07/31/23 1138   BP: (!) 167/80   Pulse: 110   Resp: 18   Temp: 97.7 °F (36.5 °C)   TempSrc: Oral   SpO2: 98%   Weight: 111.1 kg (245 lb)   Height: 5' 11" (1.803 m)     Appearance:  Anxious.  Skin: No rashes seen.  Good turgor.  No abrasions.  No ecchymoses.  Eyes: No conjunctival injection.  ENT: Oropharynx clear.    Chest: Clear to auscultation bilaterally.  Good air movement.  No wheezes.  No rhonchi.  Cardiovascular: Regular rate and rhythm.  No murmurs. No gallops. No rubs.  Abdomen: Soft.  Not distended.  Nontender.  No guarding.  No rebound.  Musculoskeletal: Good range of motion all joints.  No deformities.  Neck supple.  No meningismus.  Neurologic: Motor intact.  Sensation intact.  Cranial nerves intact.  Mental Status:  Alert and oriented x 3.  Appropriate, conversant.      Initial MDM   Gross hematuria that started an hour ago.  Bladder scan done by the nurse shows no urine is present.  He recently gave us a sample.  Will check basic labs, give fluids and reassess.  He is slightly tachycardic, will " "follow.      Medications Given     Medications   sulfamethoxazole-trimethoprim 800-160mg per tablet 1 tablet (has no administration in time range)   sodium chloride 0.9% bolus 1,000 mL 1,000 mL (0 mLs Intravenous Stopped 7/31/23 1345)       Results and Course     Labs Reviewed   CBC W/ AUTO DIFFERENTIAL - Abnormal; Notable for the following components:       Result Value    WBC 18.11 (*)     Gran # (ANC) 15.1 (*)     Immature Grans (Abs) 0.07 (*)     Mono # 1.3 (*)     Gran % 83.3 (*)     Lymph % 8.1 (*)     All other components within normal limits   URINALYSIS, REFLEX TO URINE CULTURE - Abnormal; Notable for the following components:    Color, UA Red (*)     Appearance, UA Cloudy (*)     Protein, UA 3+ (*)     Occult Blood UA 3+ (*)     Leukocytes, UA 1+ (*)     All other components within normal limits    Narrative:     Specimen Source->Urine   URINALYSIS MICROSCOPIC - Abnormal; Notable for the following components:    RBC, UA >100 (*)     WBC, UA 20 (*)     Bacteria Few (*)     All other components within normal limits    Narrative:     Specimen Source->Urine   CULTURE, URINE   COMPREHENSIVE METABOLIC PANEL       Imaging Results    None         ED Course as of 07/31/23 1422   Mon Jul 31, 2023   1250 [facility] bladder scan showed "no urine" x 2 operators [DC]   1254 WBC(!): 18.11 [DC]   1254 Hemoglobin: 15.6 [DC]   1254 Platelets: 213 [DC]   1319 Creatinine: 0.9 [DC]   1352 Creatinine: 0.9 [DC]   1352 WBC(!): 18.11 [DC]   1352 Hemoglobin: 15.6 [DC]   1352 Platelets: 213 [DC]   1352 WBC, UA(!): 20 [DC]   1352 RBC, UA(!): >100 [DC]   1352 Leukocytes, UA(!): 1+ [DC]      ED Course User Index  [DC] Dez Avila MD           MDM, Impression and Plan   78 y.o. male with gross hematuria that started abruptly today.  Blood work looks okay, urine shows the gross hematuria and hints of infection.  I will treat him with antibiotics while awaiting the culture.  I considered a CT scan but I think nephrolithiasis " unlikely in this patient with no pain.  I think what he really needs is antibiotic treatment and perhaps a cystoscope.  I have recommended that he follow-up with his urologist as an outpatient.     Final diagnoses:  [R31.0] Gross hematuria (Primary)        ED Disposition Condition    Discharge Stable          ED Prescriptions       Medication Sig Dispense Start Date End Date Auth. Provider    sulfamethoxazole-trimethoprim 800-160mg (BACTRIM DS) 800-160 mg Tab Take 1 tablet by mouth 2 (two) times daily. for 7 days 14 tablet 7/31/2023 8/7/2023 Dez Avila MD          Follow-up Information       Follow up With Specialties Details Why Contact Info    Maury Cartagena MD Urology Schedule an appointment as soon as possible for a visit   [address] [facility] [id number]  [phone]                 Dez Avila MD  07/31/23 1423

## 2023-07-31 NOTE — TELEPHONE ENCOUNTER
Urinary hesitancy & frequency since ab 10-1030am. Noticed blood in urine, then next 2 episodes pure blood with clots.     Advised per protocol-ER now for eval. Pt vu.     Reason for Disposition   Passing pure blood or large blood clots (i.e., larger than a dime or grape)    Additional Information   Negative: Shock suspected (e.g., cold/pale/clammy skin, too weak to stand, low BP, rapid pulse)   Negative: Sounds like a life-threatening emergency to the triager   Negative: Recent back or abdominal injury   Negative: Recent genital injury   Negative: Unable to urinate (or only a few drops) > 4 hours and bladder feels very full (e.g., palpable bladder or strong urge to urinate)    Protocols used: Urine - Blood In-A-OH

## 2023-07-31 NOTE — FIRST PROVIDER EVALUATION
Emergency Department TeleTriage Encounter Note      CHIEF COMPLAINT    Chief Complaint   Patient presents with    Hematuria     Hr ago urinated yellow, burning, then started bloody urine, urinating often       VITAL SIGNS   Initial Vitals [07/31/23 1138]   BP Pulse Resp Temp SpO2   (!) 167/80 110 18 97.7 °F (36.5 °C) 98 %      MAP       --            ALLERGIES    Review of patient's allergies indicates:  No Known Allergies    PROVIDER TRIAGE NOTE  This is a teletriage evaluation of a 78 y.o. male presenting to the ED complaining of urinary frequency and hematuria starting today. Denies pain. No anticoagulant use or known bleeding from other source. Pmhx of prostate cancer.    Initial orders will be placed and care will be transferred to an alternate provider when patient is roomed for a full evaluation. Any additional orders and the final disposition will be determined by that provider.         ORDERS  Labs Reviewed   CBC W/ AUTO DIFFERENTIAL   COMPREHENSIVE METABOLIC PANEL   URINALYSIS, REFLEX TO URINE CULTURE       ED Orders (720h ago, onward)      Start Ordered     Status Ordering Provider    07/31/23 1210 07/31/23 1209  CBC auto differential  STAT         Ordered MELIDA JAIME N.    07/31/23 1210 07/31/23 1209  Comprehensive metabolic panel  STAT         Ordered MELIDA JAIME N.    07/31/23 1210 07/31/23 1209  Insert Saline lock IV  Once         Ordered MELIDA JAIME N.    07/31/23 1210 07/31/23 1209  Urinalysis, Reflex to Urine Culture Urine, Clean Catch  STAT         Ordered MELIDA JAIME              Virtual Visit Note: The provider triage portion of this emergency department evaluation and documentation was performed via [employer], a HIPAA-compliant telemedicine application, in concert with a tele-presenter in the room. A face to face patient evaluation with one of my colleagues will occur once the patient is placed in an emergency department room.      DISCLAIMER:  This note was prepared with [employer] voice recognition transcription software. Garbled syntax, mangled pronouns, and other bizarre constructions may be attributed to that software system.

## 2023-07-31 NOTE — PROGRESS NOTES
Physical Therapy Daily Treatment Note     Name: Peng CHAVEZ [facility] Number: [id number]    Therapy Diagnosis:   Encounter Diagnosis   Name Primary?    Pain Yes     Physician: Karl Joseph Jr., *    Visit Date: 7/31/2023  Physician Orders: PT Eval and Treat   Medical Diagnosis from Referral: M17.11 (ICD-10-CM) - Unilateral primary osteoarthritis, right knee   Evaluation Date: 6/19/2023  Authorization Period Expiration: 6-19-24  Plan of Care Expiration: 9-30-23  Visit # / Visits authorized: 1/ 1    Time In: 8:20 am  Time Out: 9:10 am  Total Billable Time: 40 minutes    Precautions: Standard    Subjective     Pt reports: R knee feels good.   He was compliant with home exercise program.  Response to previous treatment: less pain  Functional change: improved mobility    Pain: 0/10  Location: right knee      Objective     Still lacks TKE.  MIN swelling.  MIN knee flexion gait noted.  QS ability increased.  No TTP hip flexors.    Peng received therapeutic exercises to develop strength, ROM, flexibility, posture, and core stabilization for 40 minutes including:  Bike x 8 min  Pat MOBS/PROM x 5 min  Standing GTB TKE x 25 (all exercises)  Standing GTB hip abd and ext  GTB bridges  GTB clamshells  SLR with 4#    SAQ with 4#    Hams curls with 4#  Shuttle LP with 4 bands   Shuttle SLP with 3 bands  HSS 5 x 30 sec hold  Prone quad stretch x 3 min  EOT hip flexor stretch x 3 min  Lateral step ups    Theraball mini squats  HEP review    Peng participated in neuromuscular re-education activities to improve:       Peng received cold pack for 10 min minutes to R knee.      Home Exercises Provided and Patient Education Provided     Education provided:   - ice for pain    Written Home Exercises Provided: Patient instructed to cont prior HEP.  Exercises were reviewed and Peng was able to demonstrate them prior to the end of the session.  Peng demonstrated good  understanding of the education provided.     See EMR under  MEDIA for exercises provided prior visit.    Assessment     Tolerated exercises well without c/o pain.    Peng Is progressing well towards his goals.   Pt prognosis is Good.     Pt will continue to benefit from skilled outpatient physical therapy to address the deficits listed in the problem list box on initial evaluation, provide pt/family education and to maximize pt's level of independence in the home and community environment.     Pt's spiritual, cultural and educational needs considered and pt agreeable to plan of care and goals.    Anticipated barriers to physical therapy: none    Goals: Short Term Goals: 2 weeks         1.   Independent with HEP        2.  Pt will report decreased pain level of < 50% from above measure with ADL     Long Term Goals:   GOALS:    8_   weeks. Pt agrees with goals set.  Independent with HEP.  Report decreased    R knee    pain  <   / =  3/10 with ADL such as walking > 30 min  Increased MMT  for  R LE to 4+/5 to 5/5  with ADL such as stair climbing  Increased arom  for  R knee to -2 deg ext to 125 deg flexion with functional activities such walking or self-care       Plan     D/C after next visit.    Greg Temple, PT

## 2023-08-01 ENCOUNTER — PATIENT MESSAGE (OUTPATIENT)
Dept: REHABILITATION | Facility: HOSPITAL | Age: 78
End: 2023-08-01
Payer: MEDICARE

## 2023-08-01 ENCOUNTER — PATIENT MESSAGE (OUTPATIENT)
Dept: UROLOGY | Facility: CLINIC | Age: 78
End: 2023-08-01
Payer: MEDICARE

## 2023-08-02 LAB — BACTERIA UR CULT: ABNORMAL

## 2023-08-04 ENCOUNTER — OFFICE VISIT (OUTPATIENT)
Dept: URGENT CARE | Facility: CLINIC | Age: 78
End: 2023-08-04
Payer: MEDICARE

## 2023-08-04 VITALS
TEMPERATURE: 99 F | BODY MASS INDEX: 34.3 KG/M2 | HEIGHT: 71 IN | SYSTOLIC BLOOD PRESSURE: 123 MMHG | RESPIRATION RATE: 16 BRPM | DIASTOLIC BLOOD PRESSURE: 77 MMHG | WEIGHT: 245 LBS | OXYGEN SATURATION: 96 % | HEART RATE: 90 BPM

## 2023-08-04 DIAGNOSIS — N39.0 URINARY TRACT INFECTION WITH HEMATURIA, SITE UNSPECIFIED: ICD-10-CM

## 2023-08-04 DIAGNOSIS — R31.9 URINARY TRACT INFECTION WITH HEMATURIA, SITE UNSPECIFIED: ICD-10-CM

## 2023-08-04 LAB
BILIRUB UR QL STRIP: NEGATIVE
GLUCOSE UR QL STRIP: NEGATIVE
KETONES UR QL STRIP: NEGATIVE
LEUKOCYTE ESTERASE UR QL STRIP: NEGATIVE
PH, POC UA: 5.5 (ref 5–8)
POC BLOOD, URINE: POSITIVE
POC NITRATES, URINE: NEGATIVE
PROT UR QL STRIP: POSITIVE
SP GR UR STRIP: 1.02 (ref 1–1.03)
UROBILINOGEN UR STRIP-ACNC: ABNORMAL (ref 0.3–2.2)

## 2023-08-04 PROCEDURE — 96372 THER/PROPH/DIAG INJ SC/IM: CPT | Mod: S$GLB,,, | Performed by: FAMILY MEDICINE

## 2023-08-04 PROCEDURE — 96372 PR INJECTION,THERAP/PROPH/DIAG2ST, IM OR SUBCUT: ICD-10-PCS | Mod: S$GLB,,, | Performed by: FAMILY MEDICINE

## 2023-08-04 PROCEDURE — 81003 POCT URINALYSIS, DIPSTICK, AUTOMATED, W/O SCOPE: ICD-10-PCS | Mod: QW,S$GLB,, | Performed by: FAMILY MEDICINE

## 2023-08-04 PROCEDURE — 99213 PR OFFICE/OUTPT VISIT, EST, LEVL III, 20-29 MIN: ICD-10-PCS | Mod: 25,S$GLB,, | Performed by: FAMILY MEDICINE

## 2023-08-04 PROCEDURE — 99213 OFFICE O/P EST LOW 20 MIN: CPT | Mod: 25,S$GLB,, | Performed by: FAMILY MEDICINE

## 2023-08-04 PROCEDURE — 81003 URINALYSIS AUTO W/O SCOPE: CPT | Mod: QW,S$GLB,, | Performed by: FAMILY MEDICINE

## 2023-08-04 RX ORDER — CEFTRIAXONE 1 G/1
1 INJECTION, POWDER, FOR SOLUTION INTRAMUSCULAR; INTRAVENOUS
Status: COMPLETED | OUTPATIENT
Start: 2023-08-04 | End: 2023-08-04

## 2023-08-04 RX ORDER — CIPROFLOXACIN 500 MG/1
500 TABLET ORAL 2 TIMES DAILY
Qty: 14 TABLET | Refills: 0 | Status: SHIPPED | OUTPATIENT
Start: 2023-08-04 | End: 2023-08-11

## 2023-08-04 RX ADMIN — CEFTRIAXONE 1 G: 1 INJECTION, POWDER, FOR SOLUTION INTRAMUSCULAR; INTRAVENOUS at 07:08

## 2023-08-04 NOTE — PROGRESS NOTES
"Subjective:      Patient ID: Peng Sullivan is a 78 y.o. male.    Vitals:  height is 5' 11" (1.803 m) and weight is 111.1 kg (245 lb). His oral temperature is 98.5 °F (36.9 °C). His blood pressure is 123/77 and his pulse is 90. His respiration is 16 and oxygen saturation is 96%.     Chief Complaint: Urinary Tract Infection    This is a 78 y.o. male who presents today with a chief complaint of  fatigue and decrease appetite. Pt went to er on Monday during the day and was diagnosed with a possible UTI. Pt had blood in urine, frequency, and burning.  Pt denies no bleeding in urine today but frequency is still there.             Hometx: None        PMH: Chronic OsteoArthritis, HTN,     Urinary Tract Infection   This is a new problem. The current episode started in the past 7 days. The problem occurs every urination. The problem has been gradually improving. The quality of the pain is described as burning. The pain is at a severity of 0/10. The patient is experiencing no pain. There has been no fever. He is Sexually active. There is No history of pyelonephritis. Associated symptoms include a discharge, flank pain, frequency, hematuria, hesitancy and urgency. Pertinent negatives include no behavior changes, chills, nausea, possible pregnancy, sweats, vomiting, weight loss, bubble bath use, constipation, rash or withholding. The treatment provided no relief.       Constitution: Negative for chills.   Gastrointestinal:  Negative for nausea, vomiting and constipation.   Genitourinary:  Positive for frequency, urgency, flank pain and hematuria.   Skin:  Negative for rash.      Objective:     Physical Exam   Constitutional: He does not appear ill. No distress. obesity  HENT:   Head: Normocephalic and atraumatic.   Nose: Nose normal.   Mouth/Throat: Mucous membranes are moist.   Eyes: Pupils are equal, round, and reactive to light. Extraocular movement intact   Neck: Neck supple.   Cardiovascular: Normal rate, regular " rhythm, normal heart sounds and normal pulses.   Pulmonary/Chest: Effort normal and breath sounds normal.   Abdominal: Normal appearance. Soft. There is no left CVA tenderness and no right CVA tenderness.   Neurological: He is alert.   Nursing note and vitals reviewed.    Results for orders placed or performed in visit on 08/04/23   POCT Urinalysis, Dipstick, Automated, W/O Scope   Result Value Ref Range    POC Blood, Urine Positive (A) Negative    POC Bilirubin, Urine Negative Negative    POC Urobilinogen, Urine NORM 0.3 - 2.2    POC Ketones, Urine Negative Negative    POC Protein, Urine Positive (A) Negative    POC Nitrates, Urine Negative Negative    POC Glucose, Urine Negative Negative    pH, UA 5.5 5 - 8    POC Specific Gravity, Urine 1.020 1.003 - 1.029    POC Leukocytes, Urine Negative Negative        Assessment:     1. Urinary tract infection with hematuria, site unspecified    Incompletely treated bactrim DS resistant E. Coli. Advised to discontinue bactrim and start cipro. Followup with Urology as scheduled in 3 days. RTC prn worsening symptoms    Plan:       Urinary tract infection with hematuria, site unspecified  -     POCT Urinalysis, Dipstick, Automated, W/O Scope  -     cefTRIAXone injection 1 g  -     ciprofloxacin HCl (CIPRO) 500 MG tablet; Take 1 tablet (500 mg total) by mouth 2 (two) times daily. for 7 days  Dispense: 14 tablet; Refill: 0

## 2023-08-07 ENCOUNTER — OFFICE VISIT (OUTPATIENT)
Dept: UROLOGY | Facility: CLINIC | Age: 78
End: 2023-08-07
Payer: MEDICARE

## 2023-08-07 VITALS
DIASTOLIC BLOOD PRESSURE: 69 MMHG | HEART RATE: 74 BPM | WEIGHT: 235.69 LBS | HEIGHT: 71 IN | SYSTOLIC BLOOD PRESSURE: 117 MMHG | BODY MASS INDEX: 33 KG/M2

## 2023-08-07 DIAGNOSIS — N30.01 ACUTE CYSTITIS WITH HEMATURIA: ICD-10-CM

## 2023-08-07 DIAGNOSIS — C61 PROSTATE CANCER: Primary | ICD-10-CM

## 2023-08-07 PROCEDURE — 3078F DIAST BP <80 MM HG: CPT | Mod: HCNC,CPTII,S$GLB, | Performed by: STUDENT IN AN ORGANIZED HEALTH CARE EDUCATION/TRAINING PROGRAM

## 2023-08-07 PROCEDURE — 99999 PR PBB SHADOW E&M-EST. PATIENT-LVL III: CPT | Mod: PBBFAC,HCNC,, | Performed by: STUDENT IN AN ORGANIZED HEALTH CARE EDUCATION/TRAINING PROGRAM

## 2023-08-07 PROCEDURE — 1101F PT FALLS ASSESS-DOCD LE1/YR: CPT | Mod: HCNC,CPTII,S$GLB, | Performed by: STUDENT IN AN ORGANIZED HEALTH CARE EDUCATION/TRAINING PROGRAM

## 2023-08-07 PROCEDURE — 3288F PR FALLS RISK ASSESSMENT DOCUMENTED: ICD-10-PCS | Mod: HCNC,CPTII,S$GLB, | Performed by: STUDENT IN AN ORGANIZED HEALTH CARE EDUCATION/TRAINING PROGRAM

## 2023-08-07 PROCEDURE — 1126F PR PAIN SEVERITY QUANTIFIED, NO PAIN PRESENT: ICD-10-PCS | Mod: HCNC,CPTII,S$GLB, | Performed by: STUDENT IN AN ORGANIZED HEALTH CARE EDUCATION/TRAINING PROGRAM

## 2023-08-07 PROCEDURE — 99999 PR PBB SHADOW E&M-EST. PATIENT-LVL III: ICD-10-PCS | Mod: PBBFAC,HCNC,, | Performed by: STUDENT IN AN ORGANIZED HEALTH CARE EDUCATION/TRAINING PROGRAM

## 2023-08-07 PROCEDURE — 99214 PR OFFICE/OUTPT VISIT, EST, LEVL IV, 30-39 MIN: ICD-10-PCS | Mod: HCNC,S$GLB,, | Performed by: STUDENT IN AN ORGANIZED HEALTH CARE EDUCATION/TRAINING PROGRAM

## 2023-08-07 PROCEDURE — 1101F PR PT FALLS ASSESS DOC 0-1 FALLS W/OUT INJ PAST YR: ICD-10-PCS | Mod: HCNC,CPTII,S$GLB, | Performed by: STUDENT IN AN ORGANIZED HEALTH CARE EDUCATION/TRAINING PROGRAM

## 2023-08-07 PROCEDURE — 1159F MED LIST DOCD IN RCRD: CPT | Mod: HCNC,CPTII,S$GLB, | Performed by: STUDENT IN AN ORGANIZED HEALTH CARE EDUCATION/TRAINING PROGRAM

## 2023-08-07 PROCEDURE — 3074F SYST BP LT 130 MM HG: CPT | Mod: HCNC,CPTII,S$GLB, | Performed by: STUDENT IN AN ORGANIZED HEALTH CARE EDUCATION/TRAINING PROGRAM

## 2023-08-07 PROCEDURE — 3288F FALL RISK ASSESSMENT DOCD: CPT | Mod: HCNC,CPTII,S$GLB, | Performed by: STUDENT IN AN ORGANIZED HEALTH CARE EDUCATION/TRAINING PROGRAM

## 2023-08-07 PROCEDURE — 1126F AMNT PAIN NOTED NONE PRSNT: CPT | Mod: HCNC,CPTII,S$GLB, | Performed by: STUDENT IN AN ORGANIZED HEALTH CARE EDUCATION/TRAINING PROGRAM

## 2023-08-07 PROCEDURE — 1159F PR MEDICATION LIST DOCUMENTED IN MEDICAL RECORD: ICD-10-PCS | Mod: HCNC,CPTII,S$GLB, | Performed by: STUDENT IN AN ORGANIZED HEALTH CARE EDUCATION/TRAINING PROGRAM

## 2023-08-07 PROCEDURE — 3074F PR MOST RECENT SYSTOLIC BLOOD PRESSURE < 130 MM HG: ICD-10-PCS | Mod: HCNC,CPTII,S$GLB, | Performed by: STUDENT IN AN ORGANIZED HEALTH CARE EDUCATION/TRAINING PROGRAM

## 2023-08-07 PROCEDURE — 3078F PR MOST RECENT DIASTOLIC BLOOD PRESSURE < 80 MM HG: ICD-10-PCS | Mod: HCNC,CPTII,S$GLB, | Performed by: STUDENT IN AN ORGANIZED HEALTH CARE EDUCATION/TRAINING PROGRAM

## 2023-08-07 PROCEDURE — 99214 OFFICE O/P EST MOD 30 MIN: CPT | Mod: HCNC,S$GLB,, | Performed by: STUDENT IN AN ORGANIZED HEALTH CARE EDUCATION/TRAINING PROGRAM

## 2023-08-07 RX ORDER — CIPROFLOXACIN 500 MG/1
500 TABLET ORAL 2 TIMES DAILY
Qty: 8 TABLET | Refills: 0 | Status: SHIPPED | OUTPATIENT
Start: 2023-08-07 | End: 2023-08-11

## 2023-08-07 NOTE — PROGRESS NOTES
[facility]  Urologic Oncology Clinic Note        Date of Service: 8/7/2023        Chief Complaint/Reason for Consultation:  Low Risk Prostate Cancer    Requesting Provider:   Joe Torres MD  [address]  [facility] [place] [address]      History of Present Illness:   Patient 78 y.o. male presents with hx of low risk prostate cancer.      No family hx of prostate cancer.   Only voiding complaint is daytime frequency. Nocturia 0-1. Some mild ED, not taking PDE5I.     No other new changes in health. Having some trouble with right lower extremity, but no heart attacks or strokes.     Today he comes in complaining of recent UTI and feeling awful. He was treated with the wrong antibiotics and it appears that he has become quite symptomatic. He ultimately was changed from bactrim to cipro and is feeling better. He did report gross hematuria at the time of urine culture showing E coli UTI. Hematuria has now resolved.      Urologic History:      10/7/2022 -- PSA 7.4  11/4/2022 -- mpMRI prostate -- PV 32cc, PSA 7.4, PSAD 0.23; MARKUS 1 PIRADS 3 Left anterior TZ  11/30/2022 -- UroNav + systematic bx -- LA 3+3 1/1 cores, target negative, all other cores benign  12/29/2022 -- IPSS 13, nocturia 0-1; EUGENE 21  6/2/2023 -- PSA 9.6       Patient Active Problem List    Diagnosis Date Noted    Pain 06/19/2023    Prostate cancer 04/27/2023    Degenerative lumbar disc 03/08/2021    Seasonal allergic rhinitis due to pollen 04/21/2020    Obesity (BMI 30.0-34.9) 03/03/2020    Prostate cancer screening 03/03/2020    Bronchitis 11/19/2019    Mixed hyperlipidemia 08/22/2019    Benign essential hypertension 08/22/2019    Colon adenoma 07/19/2019    Posterior vitreous detachment of both eyes 10/25/2012    Nuclear sclerotic cataract of both eyes 10/25/2012          Review of patient's allergies indicates:  No Known Allergies     Past Medical History:   Diagnosis Date    Degenerative lumbar disc 03/08/2021     Diverticulosis     Hemorrhoids     High cholesterol     History of colon polyps 99, 6/3/19    Hypertension     Prostate cancer       Past Surgical History:   Procedure Laterality Date    APPENDECTOMY  1954    COLONOSCOPY  2019    [facility]    COLONOSCOPY N/A 2019    Procedure: COLONOSCOPY/pos emr;  Surgeon: Shantanu Lee MD;  Location: [facility] ENDO ([address] [place] [address]);  Service: Endoscopy;  Laterality: N/A;  Referred by Dr. BERTO Keyes-see media tab for scanned records-      HT: 5'11, WT: 254.4, BMI: 35.5      Family History   Problem Relation Age of Onset    Heart attack Father     Heart disease Father         Congestive heart failure    No Known Problems Mother     No Known Problems Maternal Aunt     No Known Problems Maternal Uncle     No Known Problems Paternal Aunt     No Known Problems Paternal Uncle     No Known Problems Paternal Grandmother     No Known Problems Paternal Grandfather     No Known Problems Maternal Grandmother     No Known Problems Maternal Grandfather     Cataracts Sister     Colon cancer Sister     Breast cancer Sister     No Known Problems Sister     No Known Problems Sister     No Known Problems Sister     No Known Problems Brother     Amblyopia Neg Hx     Blindness Neg Hx     Diabetes Neg Hx     Glaucoma Neg Hx     Hypertension Neg Hx     Macular degeneration Neg Hx     Retinal detachment Neg Hx     Strabismus Neg Hx     Stroke Neg Hx     Thyroid disease Neg Hx       Social History     Tobacco Use    Smoking status: Former     Current packs/day: 0.00     Average packs/day: 0.5 packs/day for 30.0 years (15.0 ttl pk-yrs)     Types: Cigarettes     Start date:      Quit date:      Years since quittin.6     Passive exposure: Never    Smokeless tobacco: Former     Types: Chew     Quit date:    Substance Use Topics    Alcohol use: Not Currently     Comment: 12 beers per year.        Review of Systems   Constitutional:  Negative for activity change and  "fatigue.   HENT:  Negative for congestion.    Respiratory:  Negative for cough.    Cardiovascular:  Negative for chest pain.   Gastrointestinal:  Negative for abdominal pain.   Genitourinary:  Positive for dysuria and hematuria. Negative for flank pain.             OBJECTIVE:     Vitals:    08/07/23 1429   BP: 117/69   BP Location: Left arm   Patient Position: Sitting   BP Method: Medium (Automatic)   Pulse: 74   Weight: 106.9 kg (235 lb 10.8 oz)   Height: 5' 11" (1.803 m)          General Appearance: Alert, cooperative, no distress  Head: Normocephalic  Eyes: Clear conjunctiva  Neck: No obvious LND or JVD  Lungs: Normal chest excursion, no accessory muscle use  Chest: Regular rate rhythm by palpation, no pedal edema  Abdomen: Soft, non-tender, non-distended  Extremities: Atraumatic   Lymph Nodes: No appreciable lymph adenopathy  Neurologic: No gross gait, motor or sensory deficits            LAB:        CMP  Sodium   Date Value Ref Range Status   07/31/2023 139 136 - 145 mmol/L Final     Potassium   Date Value Ref Range Status   07/31/2023 3.9 3.5 - 5.1 mmol/L Final     Chloride   Date Value Ref Range Status   07/31/2023 102 95 - 110 mmol/L Final     CO2   Date Value Ref Range Status   07/31/2023 25 23 - 29 mmol/L Final     Glucose   Date Value Ref Range Status   07/31/2023 93 70 - 110 mg/dL Final     BUN   Date Value Ref Range Status   07/31/2023 15 8 - 23 mg/dL Final     Creatinine   Date Value Ref Range Status   07/31/2023 0.9 0.5 - 1.4 mg/dL Final     Calcium   Date Value Ref Range Status   07/31/2023 9.3 8.7 - 10.5 mg/dL Final     Total Protein   Date Value Ref Range Status   07/31/2023 7.1 6.0 - 8.4 g/dL Final     Albumin   Date Value Ref Range Status   07/31/2023 4.1 3.5 - 5.2 g/dL Final     Total Bilirubin   Date Value Ref Range Status   07/31/2023 0.8 0.1 - 1.0 mg/dL Final     Comment:     For infants and newborns, interpretation of results should be based  on gestational age, weight and in agreement with " clinical  observations.    Premature Infant recommended reference ranges:  Up to 24 hours.............<8.0 mg/dL  Up to 48 hours............<12.0 mg/dL  3-5 days..................<15.0 mg/dL  6-29 days.................<15.0 mg/dL       Alkaline Phosphatase   Date Value Ref Range Status   2023 71 55 - 135 U/L Final     AST   Date Value Ref Range Status   2023 21 10 - 40 U/L Final     ALT   Date Value Ref Range Status   2023 23 10 - 44 U/L Final     Anion Gap   Date Value Ref Range Status   2023 12 8 - 16 mmol/L Final     eGFR   Date Value Ref Range Status   2023 >60.0 >60 mL/min/1.73 m^2 Final     Lab Results   Component Value Date    WBC 18.11 (H) 2023    HGB 15.6 2023    HCT 46.2 2023    MCV 92 2023     2023           IMAGIN/4/2022  MRI PROSTATE W W/O CONTRAST     CLINICAL HISTORY:  Prostate cancer suspected;  Elevated prostate specific antigen (PSA)     Additional history: None provided.     TECHNIQUE:  Multiparametric MRI of the prostate/pelvis performed on a 3T scanner with phase pelvic coil. Multiplanar, multisequence images including high resolution, small field-of-view T2-WI; axial diffusion weighted images with multiple B-values and creation of ADC-maps; and dynamic contrast enhanced T1-weighted images through the prostate were obtained before, during, and after the administration of 10 cc intravenous gadolinium.     COMPARISON:  None.     FINDINGS:  Diffusion-weighted sequences are degraded by susceptibility artifact from air in the rectum.     Previous biopsy: None     PSA: 7.4 ng/mL 10/07/2022     Prior therapy: None     Prostate: 4.3 x 3.6 x 4.6 cm corresponding to a computed volume of 32 cc.     Peripheral zone: Atrophic right anterior peripheral zone with bandlike T2 hypointensities, likely reflecting fibrosis.  No focal lesion concerning for prostate cancer.     Transitional zone: Benign prostatic hyperplasia.  There is  a focal lesion in the left transition zone, as follows:     Lesion (MARKUS) #T-1     Location: Side: left; Region: mid; Zone: Anterior transition zone     Greatest dimension: 1.8 cm     T2-WI: Mostly encapsulated nodule, score 2.     DWI/ADC: Focal markedly hypointense on ADC and markedly hyperintense on high b-value DWI; >1.5 cm in greatest dimension, score 5.     DCE: Positive     Extraprostatic extension: Negative     PI-RADS assessment category: 3     Neurovascular bundle: Normal appearance.     Seminal vesicles: Normal appearance.     Adjacent Organ Involvement: No evidence for urinary bladder or rectal invasion.  No focal or asymmetric urinary bladder wall thickening.     Lymphadenopathy: None.     Other Findings: None.     Impression:     1.8 cm PI-RADS 3 lesion in the left anterior transition zone at the midgland.     Overall Assessment: PI-RADS 3 - Intermediate (the presence of clinically significant cancer is equivocal)     Number of targets created for potential MR/US fusion biopsy     Peripheral zone: 0     Transition zone: 1     Electronically signed by resident: Fabian Snyder MD  Date:                                            11/04/2022  Time:                                           09:48     Electronically signed by: Nilton Sharma  Date:                                            11/04/2022  Time:                                           15:46        ASSESSMENT/PLAN:       76 yo M w/ hx of low risk prostate cancer      Plan:     Prostate Cancer, low risk  -Keep Nov appt for MRI  -Will discuss need for further biopsy after next MRI    Gross Hematuria  -Will get a UA in 6 weeks to make sure hematuria resolved  -Believe it to be related to UTI, if persistent with perform hematuria workup    UTI, Ecoli  -Continue cipro for 10d  -Due to this being first infection will not need cystoscopy or further intervention at this time.    Will plan to see back in Nov.        The total time for the established  patient visit was at least 30 minutes in both face-to-face and non-face-to-face activities, which included chart review, interpretation of results, counseling, education, ordering meds/tests/procedures, and/or coordination of care.      Maury Cartagena MD  Urologic Oncology  P: [phone]

## 2023-08-10 ENCOUNTER — PATIENT MESSAGE (OUTPATIENT)
Dept: UROLOGY | Facility: CLINIC | Age: 78
End: 2023-08-10
Payer: MEDICARE

## 2023-08-29 ENCOUNTER — TELEPHONE (OUTPATIENT)
Dept: PRIMARY CARE CLINIC | Facility: CLINIC | Age: 78
End: 2023-08-29
Payer: MEDICARE

## 2023-08-29 DIAGNOSIS — Z12.5 PROSTATE CANCER SCREENING: ICD-10-CM

## 2023-08-29 DIAGNOSIS — E78.2 MIXED HYPERLIPIDEMIA: ICD-10-CM

## 2023-08-29 DIAGNOSIS — I10 BENIGN ESSENTIAL HYPERTENSION: Primary | ICD-10-CM

## 2023-08-29 NOTE — TELEPHONE ENCOUNTER
----- Message from Reema Larkin sent at 8/29/2023  8:25 AM CDT -----  Contact: [phone]  Pt has a 6mth f/u scheduled for 11/30. Pt would like this to be his annual appt. Pt's last annual was October of last year.       Pt would like to have lab orders put in and scheduled before his appt on 11/30. Pt would like an am appt at the [place] location. Please call to schedule.              Thank you

## 2023-11-13 ENCOUNTER — HOSPITAL ENCOUNTER (OUTPATIENT)
Dept: RADIOLOGY | Facility: HOSPITAL | Age: 78
Discharge: HOME | End: 2023-11-13
Attending: STUDENT IN AN ORGANIZED HEALTH CARE EDUCATION/TRAINING PROGRAM
Payer: MEDICARE

## 2023-11-13 DIAGNOSIS — C61 PROSTATE CANCER: ICD-10-CM

## 2023-11-13 PROCEDURE — 72197 MRI PELVIS W/O & W/DYE: CPT | Mod: 26,HCNC,, | Performed by: RADIOLOGY

## 2023-11-13 PROCEDURE — 25500020 PHARM REV CODE 255: Mod: HCNC | Performed by: STUDENT IN AN ORGANIZED HEALTH CARE EDUCATION/TRAINING PROGRAM

## 2023-11-13 PROCEDURE — 72197 MRI PROSTATE W W/O CONTRAST: ICD-10-PCS | Mod: 26,HCNC,, | Performed by: RADIOLOGY

## 2023-11-13 PROCEDURE — 72197 MRI PELVIS W/O & W/DYE: CPT | Mod: TC,HCNC

## 2023-11-13 PROCEDURE — A9585 GADOBUTROL INJECTION: HCPCS | Mod: HCNC | Performed by: STUDENT IN AN ORGANIZED HEALTH CARE EDUCATION/TRAINING PROGRAM

## 2023-11-13 RX ORDER — GADOBUTROL 604.72 MG/ML
10 INJECTION INTRAVENOUS
Status: COMPLETED | OUTPATIENT
Start: 2023-11-13 | End: 2023-11-13

## 2023-11-13 RX ADMIN — GADOBUTROL 10 ML: 604.72 INJECTION INTRAVENOUS at 06:11

## 2023-11-13 NOTE — PROGRESS NOTES
[facility]  Urologic Oncology Clinic Note        Date of Service: 11/14/2023        Chief Complaint/Reason for Consultation:  Low Risk Prostate Cancer    Requesting Provider:   No referring provider defined for this encounter.      History of Present Illness:   Patient 78 y.o. male presents with hx of low risk prostate cancer.      No family hx of prostate cancer.   Only voiding complaint is daytime frequency. Nocturia 0-1. Some mild ED, not taking PDE5I.     No other new changes in health. Having some trouble with right lower extremity, but no heart attacks or strokes.     At last visit he had UTI w/ ecoli and hematuria.     Today he returns for follow up UA and to discuss repeat MRI.     Urologic History:      10/7/2022 -- PSA 7.4  11/4/2022 -- mpMRI prostate -- PV 32cc, PSA 7.4, PSAD 0.23; MARKUS 1 PIRADS 3 Left anterior TZ  11/30/2022 -- UroNav + systematic bx -- LA 3+3 1/1 cores, target negative, all other cores benign  12/29/2022 -- IPSS 13, nocturia 0-1; EUGENE 21  6/2/2023 -- PSA 9.6  11/13/2023 -- PSA 9.4  11/13/2023 -- mpMRI prostate --      Patient Active Problem List    Diagnosis Date Noted    Pain 06/19/2023    Prostate cancer 04/27/2023    Degenerative lumbar disc 03/08/2021    Seasonal allergic rhinitis due to pollen 04/21/2020    Obesity (BMI 30.0-34.9) 03/03/2020    Prostate cancer screening 03/03/2020    Bronchitis 11/19/2019    Mixed hyperlipidemia 08/22/2019    Benign essential hypertension 08/22/2019    Colon adenoma 07/19/2019    Posterior vitreous detachment of both eyes 10/25/2012    Nuclear sclerotic cataract of both eyes 10/25/2012          Review of patient's allergies indicates:  No Known Allergies     Past Medical History:   Diagnosis Date    Degenerative lumbar disc 03/08/2021    Diverticulosis     Hemorrhoids     High cholesterol     History of colon polyps 6/2/99, 6/3/19    Hypertension     Prostate cancer       Past Surgical History:   Procedure Laterality Date    APPENDECTOMY   1954    COLONOSCOPY  2019    [facility]    COLONOSCOPY N/A 2019    Procedure: COLONOSCOPY/pos emr;  Surgeon: Shantanu Lee MD;  Location: [facility] ENDO ([address] [place] [address]);  Service: Endoscopy;  Laterality: N/A;  Referred by Dr. BERTO Keyes-see media tab for scanned records-      HT: 5'11, WT: 254.4, BMI: 35.5      Family History   Problem Relation Age of Onset    Heart attack Father     Heart disease Father         Congestive heart failure    No Known Problems Mother     No Known Problems Maternal Aunt     No Known Problems Maternal Uncle     No Known Problems Paternal Aunt     No Known Problems Paternal Uncle     No Known Problems Paternal Grandmother     No Known Problems Paternal Grandfather     No Known Problems Maternal Grandmother     No Known Problems Maternal Grandfather     Cataracts Sister     Colon cancer Sister     Breast cancer Sister     No Known Problems Sister     No Known Problems Sister     No Known Problems Sister     No Known Problems Brother     Amblyopia Neg Hx     Blindness Neg Hx     Diabetes Neg Hx     Glaucoma Neg Hx     Hypertension Neg Hx     Macular degeneration Neg Hx     Retinal detachment Neg Hx     Strabismus Neg Hx     Stroke Neg Hx     Thyroid disease Neg Hx       Social History     Tobacco Use    Smoking status: Former     Current packs/day: 0.00     Average packs/day: 0.5 packs/day for 30.0 years (15.0 ttl pk-yrs)     Types: Cigarettes     Start date:      Quit date:      Years since quittin.8     Passive exposure: Never    Smokeless tobacco: Former     Types: Chew     Quit date:    Substance Use Topics    Alcohol use: Not Currently     Comment: 12 beers per year.        Review of Systems   Constitutional:  Negative for activity change and fatigue.   HENT:  Negative for congestion.    Respiratory:  Negative for cough.    Cardiovascular:  Negative for chest pain.   Gastrointestinal:  Negative for abdominal pain.   Genitourinary:  Positive for  dysuria and hematuria. Negative for flank pain.             OBJECTIVE:     There were no vitals filed for this visit.         General Appearance: Alert, cooperative, no distress  Head: Normocephalic  Eyes: Clear conjunctiva  Neck: No obvious LND or JVD  Lungs: Normal chest excursion, no accessory muscle use  Chest: Regular rate rhythm by palpation, no pedal edema  Abdomen: Soft, non-tender, non-distended  Extremities: Atraumatic   Lymph Nodes: No appreciable lymph adenopathy  Neurologic: No gross gait, motor or sensory deficits            LAB:        CMP  Sodium   Date Value Ref Range Status   07/31/2023 139 136 - 145 mmol/L Final     Potassium   Date Value Ref Range Status   07/31/2023 3.9 3.5 - 5.1 mmol/L Final     Chloride   Date Value Ref Range Status   07/31/2023 102 95 - 110 mmol/L Final     CO2   Date Value Ref Range Status   07/31/2023 25 23 - 29 mmol/L Final     Glucose   Date Value Ref Range Status   07/31/2023 93 70 - 110 mg/dL Final     BUN   Date Value Ref Range Status   07/31/2023 15 8 - 23 mg/dL Final     Creatinine   Date Value Ref Range Status   07/31/2023 0.9 0.5 - 1.4 mg/dL Final     Calcium   Date Value Ref Range Status   07/31/2023 9.3 8.7 - 10.5 mg/dL Final     Total Protein   Date Value Ref Range Status   07/31/2023 7.1 6.0 - 8.4 g/dL Final     Albumin   Date Value Ref Range Status   07/31/2023 4.1 3.5 - 5.2 g/dL Final     Total Bilirubin   Date Value Ref Range Status   07/31/2023 0.8 0.1 - 1.0 mg/dL Final     Comment:     For infants and newborns, interpretation of results should be based  on gestational age, weight and in agreement with clinical  observations.    Premature Infant recommended reference ranges:  Up to 24 hours.............<8.0 mg/dL  Up to 48 hours............<12.0 mg/dL  3-5 days..................<15.0 mg/dL  6-29 days.................<15.0 mg/dL       Alkaline Phosphatase   Date Value Ref Range Status   07/31/2023 71 55 - 135 U/L Final     AST   Date Value Ref Range Status    2023 21 10 - 40 U/L Final     ALT   Date Value Ref Range Status   2023 23 10 - 44 U/L Final     Anion Gap   Date Value Ref Range Status   2023 12 8 - 16 mmol/L Final     eGFR   Date Value Ref Range Status   2023 >60.0 >60 mL/min/1.73 m^2 Final     Lab Results   Component Value Date    WBC 18.11 (H) 2023    HGB 15.6 2023    HCT 46.2 2023    MCV 92 2023     2023           IMAGIN/4/2022  MRI PROSTATE W W/O CONTRAST     CLINICAL HISTORY:  Prostate cancer suspected;  Elevated prostate specific antigen (PSA)     Additional history: None provided.     TECHNIQUE:  Multiparametric MRI of the prostate/pelvis performed on a 3T scanner with phase pelvic coil. Multiplanar, multisequence images including high resolution, small field-of-view T2-WI; axial diffusion weighted images with multiple B-values and creation of ADC-maps; and dynamic contrast enhanced T1-weighted images through the prostate were obtained before, during, and after the administration of 10 cc intravenous gadolinium.     COMPARISON:  None.     FINDINGS:  Diffusion-weighted sequences are degraded by susceptibility artifact from air in the rectum.     Previous biopsy: None     PSA: 7.4 ng/mL 10/07/2022     Prior therapy: None     Prostate: 4.3 x 3.6 x 4.6 cm corresponding to a computed volume of 32 cc.     Peripheral zone: Atrophic right anterior peripheral zone with bandlike T2 hypointensities, likely reflecting fibrosis.  No focal lesion concerning for prostate cancer.     Transitional zone: Benign prostatic hyperplasia.  There is a focal lesion in the left transition zone, as follows:     Lesion (MARKUS) #T-1     Location: Side: left; Region: mid; Zone: Anterior transition zone     Greatest dimension: 1.8 cm     T2-WI: Mostly encapsulated nodule, score 2.     DWI/ADC: Focal markedly hypointense on ADC and markedly hyperintense on high b-value DWI; >1.5 cm in greatest dimension, score  5.     DCE: Positive     Extraprostatic extension: Negative     PI-RADS assessment category: 3     Neurovascular bundle: Normal appearance.     Seminal vesicles: Normal appearance.     Adjacent Organ Involvement: No evidence for urinary bladder or rectal invasion.  No focal or asymmetric urinary bladder wall thickening.     Lymphadenopathy: None.     Other Findings: None.     Impression:     1.8 cm PI-RADS 3 lesion in the left anterior transition zone at the midgland.     Overall Assessment: PI-RADS 3 - Intermediate (the presence of clinically significant cancer is equivocal)     Number of targets created for potential MR/US fusion biopsy     Peripheral zone: 0     Transition zone: 1     Electronically signed by resident: Fabian Snyder MD  Date:                                            11/04/2022  Time:                                           09:48     Electronically signed by: Nilton Sharma  Date:                                            11/04/2022  Time:                                           15:46        ASSESSMENT/PLAN:       76 yo M w/ hx of low risk prostate cancer      Plan:     Prostate Cancer, low risk  Today I reviewed with the patient his urologic history, PSA trend, and imaging. We determined that he still has NCCN low risk prostate cancer. I pointed out that he is still a good candidate for active surveillance , but that at any time he could choose to engage in active treatment. I told him that 50% of men with his disease classification are still on active surveillance 10 years later. I also informed him that the medical community is unaware of any man dying of prostate cancer with pure [place] 3+3 disease.     Again I briefly informed him of the potential treatment options if he so chose like radiation, surgery, or ablation.     Through a shared decision making process I encouraged him to continue active surveillance but left the choice up to him. We did talk about some reasons to transition  to active treatment such as (1) a greater volume of cancer detected at biopsy, (2) higher grade cancer at biopsy, (3) patient concern or preference.  He currently does NOT fit the above criteria.    Lastly, I told him that I think we should continue on a low intensity protocol of q1yr [place] and PSA. And tumor burden reassessment every 1-3 years or more.    Finally I told the patient that even though we might think the patient is a candidate for AS, understaging and undergrading does occur, and knowledge of whether the patient is actually a good candidate is incomplete.  I could not make any guarantee that the patient was making the right decision or that his cancer may later be cured if the patient eventually opted for treatment. The patient expressed understanding of these issues.     Ultimately, through a shared decision making process he would like to continue AS.     He is currently due for biopsy.    He is in agreement with proceeding with prostate biopsy.       Gross Hematuria  -UA today to check for hematuria  -Possible cystoscopy            The total time for the established patient visit was at least 30 minutes in both face-to-face and non-face-to-face activities, which included chart review, interpretation of results, counseling, education, ordering meds/tests/procedures, and/or coordination of care.      Maury Cartagena MD  Urologic Oncology  P: [phone]

## 2023-11-14 ENCOUNTER — PATIENT MESSAGE (OUTPATIENT)
Dept: UROLOGY | Facility: CLINIC | Age: 78
End: 2023-11-14

## 2023-11-14 ENCOUNTER — OFFICE VISIT (OUTPATIENT)
Dept: UROLOGY | Facility: CLINIC | Age: 78
End: 2023-11-14
Payer: MEDICARE

## 2023-11-14 VITALS
DIASTOLIC BLOOD PRESSURE: 80 MMHG | HEIGHT: 71 IN | HEART RATE: 64 BPM | RESPIRATION RATE: 18 BRPM | WEIGHT: 253.06 LBS | BODY MASS INDEX: 35.43 KG/M2 | SYSTOLIC BLOOD PRESSURE: 133 MMHG

## 2023-11-14 DIAGNOSIS — N52.9 ERECTILE DYSFUNCTION, UNSPECIFIED ERECTILE DYSFUNCTION TYPE: ICD-10-CM

## 2023-11-14 DIAGNOSIS — C61 PROSTATE CANCER: Primary | ICD-10-CM

## 2023-11-14 PROCEDURE — 1159F MED LIST DOCD IN RCRD: CPT | Mod: HCNC,CPTII,S$GLB, | Performed by: STUDENT IN AN ORGANIZED HEALTH CARE EDUCATION/TRAINING PROGRAM

## 2023-11-14 PROCEDURE — 99999 PR PBB SHADOW E&M-EST. PATIENT-LVL III: ICD-10-PCS | Mod: PBBFAC,HCNC,, | Performed by: STUDENT IN AN ORGANIZED HEALTH CARE EDUCATION/TRAINING PROGRAM

## 2023-11-14 PROCEDURE — 3079F PR MOST RECENT DIASTOLIC BLOOD PRESSURE 80-89 MM HG: ICD-10-PCS | Mod: HCNC,CPTII,S$GLB, | Performed by: STUDENT IN AN ORGANIZED HEALTH CARE EDUCATION/TRAINING PROGRAM

## 2023-11-14 PROCEDURE — 1159F PR MEDICATION LIST DOCUMENTED IN MEDICAL RECORD: ICD-10-PCS | Mod: HCNC,CPTII,S$GLB, | Performed by: STUDENT IN AN ORGANIZED HEALTH CARE EDUCATION/TRAINING PROGRAM

## 2023-11-14 PROCEDURE — 1101F PT FALLS ASSESS-DOCD LE1/YR: CPT | Mod: HCNC,CPTII,S$GLB, | Performed by: STUDENT IN AN ORGANIZED HEALTH CARE EDUCATION/TRAINING PROGRAM

## 2023-11-14 PROCEDURE — 3075F SYST BP GE 130 - 139MM HG: CPT | Mod: HCNC,CPTII,S$GLB, | Performed by: STUDENT IN AN ORGANIZED HEALTH CARE EDUCATION/TRAINING PROGRAM

## 2023-11-14 PROCEDURE — 99214 PR OFFICE/OUTPT VISIT, EST, LEVL IV, 30-39 MIN: ICD-10-PCS | Mod: HCNC,S$GLB,, | Performed by: STUDENT IN AN ORGANIZED HEALTH CARE EDUCATION/TRAINING PROGRAM

## 2023-11-14 PROCEDURE — 1101F PR PT FALLS ASSESS DOC 0-1 FALLS W/OUT INJ PAST YR: ICD-10-PCS | Mod: HCNC,CPTII,S$GLB, | Performed by: STUDENT IN AN ORGANIZED HEALTH CARE EDUCATION/TRAINING PROGRAM

## 2023-11-14 PROCEDURE — 3079F DIAST BP 80-89 MM HG: CPT | Mod: HCNC,CPTII,S$GLB, | Performed by: STUDENT IN AN ORGANIZED HEALTH CARE EDUCATION/TRAINING PROGRAM

## 2023-11-14 PROCEDURE — 99999 PR PBB SHADOW E&M-EST. PATIENT-LVL III: CPT | Mod: PBBFAC,HCNC,, | Performed by: STUDENT IN AN ORGANIZED HEALTH CARE EDUCATION/TRAINING PROGRAM

## 2023-11-14 PROCEDURE — 99214 OFFICE O/P EST MOD 30 MIN: CPT | Mod: HCNC,S$GLB,, | Performed by: STUDENT IN AN ORGANIZED HEALTH CARE EDUCATION/TRAINING PROGRAM

## 2023-11-14 PROCEDURE — 3288F PR FALLS RISK ASSESSMENT DOCUMENTED: ICD-10-PCS | Mod: HCNC,CPTII,S$GLB, | Performed by: STUDENT IN AN ORGANIZED HEALTH CARE EDUCATION/TRAINING PROGRAM

## 2023-11-14 PROCEDURE — 1126F AMNT PAIN NOTED NONE PRSNT: CPT | Mod: HCNC,CPTII,S$GLB, | Performed by: STUDENT IN AN ORGANIZED HEALTH CARE EDUCATION/TRAINING PROGRAM

## 2023-11-14 PROCEDURE — 3075F PR MOST RECENT SYSTOLIC BLOOD PRESS GE 130-139MM HG: ICD-10-PCS | Mod: HCNC,CPTII,S$GLB, | Performed by: STUDENT IN AN ORGANIZED HEALTH CARE EDUCATION/TRAINING PROGRAM

## 2023-11-14 PROCEDURE — 1126F PR PAIN SEVERITY QUANTIFIED, NO PAIN PRESENT: ICD-10-PCS | Mod: HCNC,CPTII,S$GLB, | Performed by: STUDENT IN AN ORGANIZED HEALTH CARE EDUCATION/TRAINING PROGRAM

## 2023-11-14 PROCEDURE — 3288F FALL RISK ASSESSMENT DOCD: CPT | Mod: HCNC,CPTII,S$GLB, | Performed by: STUDENT IN AN ORGANIZED HEALTH CARE EDUCATION/TRAINING PROGRAM

## 2023-11-14 RX ORDER — ENEMA 19; 7 G/133ML; G/133ML
1 ENEMA RECTAL ONCE
Qty: 1 ENEMA | Refills: 0 | Status: SHIPPED | OUTPATIENT
Start: 2023-11-14 | End: 2023-11-14

## 2023-11-14 RX ORDER — TADALAFIL 10 MG/1
10 TABLET ORAL DAILY PRN
Qty: 20 TABLET | Refills: 3 | Status: SHIPPED | OUTPATIENT
Start: 2023-11-14 | End: 2024-11-13

## 2023-11-14 RX ORDER — CIPROFLOXACIN 500 MG/1
TABLET ORAL
Qty: 1 TABLET | Refills: 0 | Status: SHIPPED | OUTPATIENT
Start: 2023-11-14 | End: 2023-11-30

## 2023-11-15 ENCOUNTER — PATIENT MESSAGE (OUTPATIENT)
Dept: UROLOGY | Facility: CLINIC | Age: 78
End: 2023-11-15
Payer: MEDICARE

## 2023-11-16 ENCOUNTER — PROCEDURE VISIT (OUTPATIENT)
Dept: UROLOGY | Facility: CLINIC | Age: 78
End: 2023-11-16
Payer: MEDICARE

## 2023-11-16 VITALS
DIASTOLIC BLOOD PRESSURE: 74 MMHG | TEMPERATURE: 97 F | RESPIRATION RATE: 17 BRPM | SYSTOLIC BLOOD PRESSURE: 155 MMHG | WEIGHT: 251.44 LBS | HEART RATE: 97 BPM | BODY MASS INDEX: 35.07 KG/M2

## 2023-11-16 DIAGNOSIS — C61 PROSTATE CANCER: ICD-10-CM

## 2023-11-16 PROCEDURE — 96372 PR INJECTION,THERAP/PROPH/DIAG2ST, IM OR SUBCUT: ICD-10-PCS | Mod: HCNC,59,S$GLB, | Performed by: STUDENT IN AN ORGANIZED HEALTH CARE EDUCATION/TRAINING PROGRAM

## 2023-11-16 PROCEDURE — 76872 US TRANSRECTAL: CPT | Mod: 26,HCNC,S$GLB, | Performed by: STUDENT IN AN ORGANIZED HEALTH CARE EDUCATION/TRAINING PROGRAM

## 2023-11-16 PROCEDURE — 55700 PR BIOPSY OF PROSTATE,NEEDLE/PUNCH: CPT | Mod: HCNC,22,S$GLB, | Performed by: STUDENT IN AN ORGANIZED HEALTH CARE EDUCATION/TRAINING PROGRAM

## 2023-11-16 PROCEDURE — G0416 PROSTATE BIOPSY, ANY MTHD: HCPCS | Mod: 26,HCNC,, | Performed by: PATHOLOGY

## 2023-11-16 PROCEDURE — 76872 PR US TRANSRECTAL: ICD-10-PCS | Mod: 26,HCNC,S$GLB, | Performed by: STUDENT IN AN ORGANIZED HEALTH CARE EDUCATION/TRAINING PROGRAM

## 2023-11-16 PROCEDURE — 88305 TISSUE EXAM BY PATHOLOGIST: CPT | Mod: 59,HCNC | Performed by: PATHOLOGY

## 2023-11-16 PROCEDURE — 96372 THER/PROPH/DIAG INJ SC/IM: CPT | Mod: HCNC,59,S$GLB, | Performed by: STUDENT IN AN ORGANIZED HEALTH CARE EDUCATION/TRAINING PROGRAM

## 2023-11-16 PROCEDURE — G0416 PROSTATE BIOPSY, ANY MTHD: ICD-10-PCS | Mod: 26,HCNC,, | Performed by: PATHOLOGY

## 2023-11-16 PROCEDURE — 55700 PR BIOPSY OF PROSTATE,NEEDLE/PUNCH: ICD-10-PCS | Mod: HCNC,22,S$GLB, | Performed by: STUDENT IN AN ORGANIZED HEALTH CARE EDUCATION/TRAINING PROGRAM

## 2023-11-16 RX ORDER — CEFTRIAXONE 1 G/1
1 INJECTION, POWDER, FOR SOLUTION INTRAMUSCULAR; INTRAVENOUS
Status: COMPLETED | OUTPATIENT
Start: 2023-11-16 | End: 2023-11-16

## 2023-11-16 RX ORDER — LIDOCAINE HYDROCHLORIDE 10 MG/ML
10 INJECTION INFILTRATION; PERINEURAL
Status: COMPLETED | OUTPATIENT
Start: 2023-11-16 | End: 2023-11-16

## 2023-11-16 RX ORDER — LIDOCAINE HYDROCHLORIDE 20 MG/ML
JELLY TOPICAL
Status: COMPLETED | OUTPATIENT
Start: 2023-11-16 | End: 2023-11-16

## 2023-11-16 RX ADMIN — CEFTRIAXONE 1 G: 1 INJECTION, POWDER, FOR SOLUTION INTRAMUSCULAR; INTRAVENOUS at 12:11

## 2023-11-16 RX ADMIN — LIDOCAINE HYDROCHLORIDE: 20 JELLY TOPICAL at 12:11

## 2023-11-16 RX ADMIN — LIDOCAINE HYDROCHLORIDE 10 ML: 10 INJECTION INFILTRATION; PERINEURAL at 01:11

## 2023-11-16 NOTE — PROCEDURES
"DATE OF PROCEDURE:  11/16/2023          PROCEDURE: 3-DIMENSIONAL TRANSRECTAL ULTRASONOGRAPHY OF THE PROSTATE AND MRI/TRUS FUSION-GUIDED TARGETED AND SYSTEMATIC PROSTATE NEEDLE BIOPSY     Stereotactic template-guided biopsy with targeted and systematic sampling       INDICATION:Prostate cancer with radiologic area of interest(s) on mpMRI suspicious for neoplasm. Procedure includes complexity due to either >1 negative prior biopsies, under grading of cancer and/or persistent elevated PSA presenting unusual challenges in diagnosing and characterizing suspected cancer, enabling the fusion-targeted biopsy of exceptional benefit to detect or rule-out cancer.  MRI-TRUS fusion biopsy is not widely available and is being utilized for the aforementioned reasons.  It takes extraordinary training and commitment to targeted procedures and has a several -fold increased likelihood of detecting clinically significant cancer compared to conventional biopsy.  It consumes at least 3-5 fold the amount of time/work effort and requires 2 assistants along with the fusion device (Modifier -22).           NARRATIVE:  Upon entering the surgical suite, a "time out" was called and the patient, body site and surgical procedure was verified with both the nursing staff and patient according to the [facility] protocol. I and the nursing staff verified that the patient had taken his prescribed antibiotics as directed: Cipro and Ceftriaxone one gram intramuscularly approximately at least 30 minutes before the procedure.            After informed consent, the patient was placed in the left lateral, knee-chest decubitus position with the prostate positioned within the magnetic field.  A digital rectal exam was performed and the vault was empty. The transrectal ultrasound probe equipped with a magnetic tracking device was inserted per rectum in standard fashion.  15-20cc of plain Lidocaine was infused at the level of the SV-prostatic junction and the " apices, bilaterally.  Realtime ultrasonography and hard copies of the prostate, seminal vesicles, bladder, and posterior urethra were obtained in both transverse and sagittal dimensions.  Gain, depth and other parameters were adjusted to optimize image quality. The images were acquired by routine transrectal ultrasound (CPT 80591).  2D ultrasound cannot be used to plan in 3D space, so I gathered a series of images using specialized software and equipment.  I then reviewed the images in reconstructed 3D space (CPT 09397), and proceeded to fuse the TRUS images with the mpMRI. I  looked for target lesions and confirmed the planned locations for biopsy specimens (CPT 17406) as determined by the fusion software and my input.  The reconstruction shows a fusion of the ultrasound with multiparametric MRI and optimal biopsy distribution, and then directs me to the locations.  After incorporating my observations into the biopsy plan, I proceeded to take the biopsies (CPT 76490 and 26630) shown in this report.  The 3D enhanced procedure with fusion technology allows me to biopsy precisely the abnormality that is present, resulting in a faster and more accurate diagnosis for the patient.  Ultrasound findings include:          SEMINAL VESICLES/VASA:  Normal in size, shape, and echotexture.  No mass or cyst seen.  Vasa appear unremarkable.          BLADDER:  Partially full of urine. No intravesical mass was appreciated; no significant wall thickening noted.  A median lobe was not present.       CHEYENNE: No palpable nodules    PROSTATE:  The dimensions were 2.8 cm height x 4.4 cm width x 4.0 cm length (last dimension is sagittal).  The estimated prostate volume is 26.8 cc.  PSAD: 0.35.  The prostate displayed mixed echogenicity and minimal punctate calcifications.  The MRI lesion was not visible on ultrasound.  Prostate appears symmetrical with distinct margins.          PERIPROSTATIC FAT/PERIRECTAL SPACE: Periprostatic fat appear  unremarkable and perirectal space is within normal limits.          URETHRA AND GENITOURINARY SPHINCTER:  Normal echotexture.          MRI-TRUS FUSION-GUIDED PROSTATE BIOPSY: Biopsies were taken using an end-fire approach. 3-Dimensional MRI-TRUS Fusion-guided prostate biopsies were taken from each lesion as described:        Index target epicenter:  Four (3) cores        MARKUS #1 [index lesion]:  Left mid transition zone , transition zone, mid-prostate.  Four (4)biopsies  Took one north biopsy         Comment: good targeting -- biopsies were performed in the endfire approach -- 3 through the olive and 1 north biopsy      Additionally the patient did elect to have  12 systematic biopsies from the base, mid, and apex of the gland . The biopsy specimens were sent to [facility] for histological diagnosis.      Post-procedure instructions were provided. He was advised to complete any remaining antibiotics as prescribed. He was given information regarding the signs and symptoms of prostatitis and urosepsis, and told to report to the nearest health care facility should this occur.  The patient was told that he needs to make an appointment to discuss the results of the biopsy.       RTC in 2 weeks to discuss biopsy results.    COMPLEXITY STATEMENT (22-modifier): The targeted biopsy procedure involves MRI/US fusion, which substantially increases the technical difficulty of the procedure, the intensity of concentration required to perform the procedure, lengthy extra training to master the procedure, and by a factor of at least 3-5x, the time required to perform the procedure, when compared to the standard systematic prostate biopsy.          PQRS:     [id number] - Documentation of order for prophylactic antibiotic given within 1 or 2 hrs, see drug listing     [id number] - Documentation that prophylactic antibiotic has been given within 1 or 2 hrs, see drug listing

## 2023-11-16 NOTE — PATIENT INSTRUCTIONS
What to Expect After a Prostate Biopsy    Please be sure to finish your pre-procedure antibiotics as instructed.    You may have mild bleeding from the rectum or urine for about 1 week to 1 month, or in your ejaculate for several months. This bleeding is normal and expected, and it will stop. You may have mild discomfort in your rectal or urethral area for 24-48 hours.    You cannot do any strenuous lifting, straining, or exercising for 24 hours. You may return to full activity the day after the biopsy.    You may continue to take all your regular medications after the procedure except for the blood thinners.    You may resume all blood-thinning medications once you no longer see any bleeding or whenever your physician prescribing the medication says it is all right to do so. You may take Tylenol if you have a fever and your temperature is less than 100° F or if you have some discomfort.    You will receive a call from the Urology Department at [facility] with the results of your prostate biopsy within one week.    Signs and Symptoms to Report    Call your [facility] urologist at [phone] if you develop any of the following:  Temperature greater than 101°  F  Inability to urinate  A large amount of bleeding from the rectum or in the urine  Persistent or severe pain    After hours or on weekends, you may reach a urology resident on call at this number: [phone].

## 2023-11-20 ENCOUNTER — TELEPHONE (OUTPATIENT)
Dept: UROLOGY | Facility: CLINIC | Age: 78
End: 2023-11-20
Payer: MEDICARE

## 2023-11-20 DIAGNOSIS — C61 PROSTATE CANCER: Primary | ICD-10-CM

## 2023-11-20 LAB
FINAL PATHOLOGIC DIAGNOSIS: NORMAL
GROSS: NORMAL
Lab: NORMAL
MICROSCOPIC EXAM: NORMAL

## 2023-11-20 NOTE — TELEPHONE ENCOUNTER
I phoned the patient today.     I spoke with Mr Sullivan    Patient was identified by two patient identifiers and asked if it was okay to speak about his medical care by phone before the conversation was commenced.     Informed him of his pathology and discussed that he is still a good candidate for surveillance. Discussed that we will cancel his Dec appt. Discussed that we will schedule him for PSA and appt in 6 months.          Maury Cartagena MD  Urologic Oncology  P: [phone]

## 2023-11-22 ENCOUNTER — OFFICE VISIT (OUTPATIENT)
Dept: OPTOMETRY | Facility: CLINIC | Age: 78
End: 2023-11-22
Payer: MEDICARE

## 2023-11-22 DIAGNOSIS — H25.13 NUCLEAR SCLEROTIC CATARACT OF BOTH EYES: Primary | ICD-10-CM

## 2023-11-22 DIAGNOSIS — H26.9 CORTICAL CATARACT OF BOTH EYES: ICD-10-CM

## 2023-11-22 DIAGNOSIS — H43.811 VITREOUS DETACHMENT OF RIGHT EYE: ICD-10-CM

## 2023-11-22 DIAGNOSIS — H52.4 PRESBYOPIA OF BOTH EYES: ICD-10-CM

## 2023-11-22 DIAGNOSIS — Z13.5 SCREENING FOR EYE CONDITION: ICD-10-CM

## 2023-11-22 DIAGNOSIS — H43.393 VITREOUS FLOATERS OF BOTH EYES: ICD-10-CM

## 2023-11-22 DIAGNOSIS — H52.203 ASTIGMATISM OF BOTH EYES, UNSPECIFIED TYPE: ICD-10-CM

## 2023-11-22 PROCEDURE — 1159F PR MEDICATION LIST DOCUMENTED IN MEDICAL RECORD: ICD-10-PCS | Mod: HCNC,CPTII,S$GLB, | Performed by: OPTOMETRIST

## 2023-11-22 PROCEDURE — 99999 PR PBB SHADOW E&M-EST. PATIENT-LVL II: CPT | Mod: PBBFAC,HCNC,, | Performed by: OPTOMETRIST

## 2023-11-22 PROCEDURE — 92014 COMPRE OPH EXAM EST PT 1/>: CPT | Mod: HCNC,S$GLB,, | Performed by: OPTOMETRIST

## 2023-11-22 PROCEDURE — 99999 PR PBB SHADOW E&M-EST. PATIENT-LVL II: ICD-10-PCS | Mod: PBBFAC,HCNC,, | Performed by: OPTOMETRIST

## 2023-11-22 PROCEDURE — 92015 PR REFRACTION: ICD-10-PCS | Mod: HCNC,S$GLB,, | Performed by: OPTOMETRIST

## 2023-11-22 PROCEDURE — 92014 PR EYE EXAM, EST PATIENT,COMPREHESV: ICD-10-PCS | Mod: HCNC,S$GLB,, | Performed by: OPTOMETRIST

## 2023-11-22 PROCEDURE — 1159F MED LIST DOCD IN RCRD: CPT | Mod: HCNC,CPTII,S$GLB, | Performed by: OPTOMETRIST

## 2023-11-22 PROCEDURE — 92015 DETERMINE REFRACTIVE STATE: CPT | Mod: HCNC,S$GLB,, | Performed by: OPTOMETRIST

## 2023-11-22 NOTE — PATIENT INSTRUCTIONS
Nuclear sclerotic/peripheral cortical cataract in both eyes, consistent with age, more apparent in the right eye.  Advised of borderline need for cataract surgery in the right eye.  However, Mr. Sullivan not unhappy with VA with present glasses.  Defer referral for cataract surgery, and reassess in six months.    Prior diagnosis of vitreous detachment in the right eye.   Bilateral vitreous floaters.        Otherwise, good ocular health in both eyes.   No hypertensive retinal changes in either eye.      Astigmatic refractive error in each eye, with best-corrected VA of 20/50+4 (20/40-1) in the right eye and 20/30+4 in the left eye  Presbyopia.  New spectacle lens Rx issued for full-time wear., but suggest get new lenses only if absolutely necessary, as it would appear that cataract surgery will be done in the foreseeable future.  No compelling need to replace lenses at this time.      Recheck in SIX MONTHS - or prior if any problems in the interim.
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
## 2023-11-28 ENCOUNTER — LAB VISIT (OUTPATIENT)
Dept: LAB | Facility: HOSPITAL | Age: 78
End: 2023-11-28
Attending: INTERNAL MEDICINE
Payer: MEDICARE

## 2023-11-28 DIAGNOSIS — I10 BENIGN ESSENTIAL HYPERTENSION: ICD-10-CM

## 2023-11-28 DIAGNOSIS — E78.2 MIXED HYPERLIPIDEMIA: ICD-10-CM

## 2023-11-28 DIAGNOSIS — Z12.5 PROSTATE CANCER SCREENING: ICD-10-CM

## 2023-11-28 LAB
ALBUMIN SERPL BCP-MCNC: 4 G/DL (ref 3.5–5.2)
ALP SERPL-CCNC: 70 U/L (ref 55–135)
ALT SERPL W/O P-5'-P-CCNC: 25 U/L (ref 10–44)
ANION GAP SERPL CALC-SCNC: 7 MMOL/L (ref 8–16)
AST SERPL-CCNC: 21 U/L (ref 10–40)
BASOPHILS # BLD AUTO: 0.04 K/UL (ref 0–0.2)
BASOPHILS NFR BLD: 0.6 % (ref 0–1.9)
BILIRUB SERPL-MCNC: 0.7 MG/DL (ref 0.1–1)
BUN SERPL-MCNC: 17 MG/DL (ref 8–23)
CALCIUM SERPL-MCNC: 9.7 MG/DL (ref 8.7–10.5)
CHLORIDE SERPL-SCNC: 103 MMOL/L (ref 95–110)
CHOLEST SERPL-MCNC: 154 MG/DL (ref 120–199)
CHOLEST/HDLC SERPL: 2.7 {RATIO} (ref 2–5)
CO2 SERPL-SCNC: 31 MMOL/L (ref 23–29)
COMPLEXED PSA SERPL-MCNC: 11.6 NG/ML (ref 0–4)
CREAT SERPL-MCNC: 0.8 MG/DL (ref 0.5–1.4)
DIFFERENTIAL METHOD: NORMAL
EOSINOPHIL # BLD AUTO: 0.2 K/UL (ref 0–0.5)
EOSINOPHIL NFR BLD: 3.4 % (ref 0–8)
ERYTHROCYTE [DISTWIDTH] IN BLOOD BY AUTOMATED COUNT: 12.4 % (ref 11.5–14.5)
EST. GFR  (NO RACE VARIABLE): >60 ML/MIN/1.73 M^2
GLUCOSE SERPL-MCNC: 90 MG/DL (ref 70–110)
HCT VFR BLD AUTO: 45.8 % (ref 40–54)
HDLC SERPL-MCNC: 57 MG/DL (ref 40–75)
HDLC SERPL: 37 % (ref 20–50)
HGB BLD-MCNC: 15.1 G/DL (ref 14–18)
IMM GRANULOCYTES # BLD AUTO: 0.01 K/UL (ref 0–0.04)
IMM GRANULOCYTES NFR BLD AUTO: 0.1 % (ref 0–0.5)
LDLC SERPL CALC-MCNC: 80.6 MG/DL (ref 63–159)
LYMPHOCYTES # BLD AUTO: 1.7 K/UL (ref 1–4.8)
LYMPHOCYTES NFR BLD: 25.1 % (ref 18–48)
MCH RBC QN AUTO: 30 PG (ref 27–31)
MCHC RBC AUTO-ENTMCNC: 33 G/DL (ref 32–36)
MCV RBC AUTO: 91 FL (ref 82–98)
MONOCYTES # BLD AUTO: 0.6 K/UL (ref 0.3–1)
MONOCYTES NFR BLD: 9.4 % (ref 4–15)
NEUTROPHILS # BLD AUTO: 4.1 K/UL (ref 1.8–7.7)
NEUTROPHILS NFR BLD: 61.4 % (ref 38–73)
NONHDLC SERPL-MCNC: 97 MG/DL
NRBC BLD-RTO: 0 /100 WBC
PLATELET # BLD AUTO: 212 K/UL (ref 150–450)
PMV BLD AUTO: 11.8 FL (ref 9.2–12.9)
POTASSIUM SERPL-SCNC: 4.1 MMOL/L (ref 3.5–5.1)
PROT SERPL-MCNC: 7.1 G/DL (ref 6–8.4)
RBC # BLD AUTO: 5.04 M/UL (ref 4.6–6.2)
SODIUM SERPL-SCNC: 141 MMOL/L (ref 136–145)
TRIGL SERPL-MCNC: 82 MG/DL (ref 30–150)
WBC # BLD AUTO: 6.72 K/UL (ref 3.9–12.7)

## 2023-11-28 PROCEDURE — 80061 LIPID PANEL: CPT | Mod: HCNC | Performed by: INTERNAL MEDICINE

## 2023-11-28 PROCEDURE — 85025 COMPLETE CBC W/AUTO DIFF WBC: CPT | Mod: HCNC | Performed by: INTERNAL MEDICINE

## 2023-11-28 PROCEDURE — 36415 COLL VENOUS BLD VENIPUNCTURE: CPT | Performed by: INTERNAL MEDICINE

## 2023-11-28 PROCEDURE — 80053 COMPREHEN METABOLIC PANEL: CPT | Mod: HCNC | Performed by: INTERNAL MEDICINE

## 2023-11-28 PROCEDURE — 84153 ASSAY OF PSA TOTAL: CPT | Mod: HCNC | Performed by: INTERNAL MEDICINE

## 2023-11-30 ENCOUNTER — OFFICE VISIT (OUTPATIENT)
Dept: PRIMARY CARE CLINIC | Facility: CLINIC | Age: 78
End: 2023-11-30
Payer: MEDICARE

## 2023-11-30 VITALS
BODY MASS INDEX: 35.89 KG/M2 | RESPIRATION RATE: 18 BRPM | HEIGHT: 71 IN | TEMPERATURE: 98 F | DIASTOLIC BLOOD PRESSURE: 84 MMHG | WEIGHT: 256.38 LBS | SYSTOLIC BLOOD PRESSURE: 120 MMHG | HEART RATE: 76 BPM | OXYGEN SATURATION: 98 %

## 2023-11-30 DIAGNOSIS — C61 PROSTATE CANCER: Primary | ICD-10-CM

## 2023-11-30 DIAGNOSIS — E78.2 MIXED HYPERLIPIDEMIA: ICD-10-CM

## 2023-11-30 DIAGNOSIS — I10 BENIGN ESSENTIAL HYPERTENSION: ICD-10-CM

## 2023-11-30 DIAGNOSIS — E66.01 SEVERE OBESITY (BMI 35.0-39.9) WITH COMORBIDITY: ICD-10-CM

## 2023-11-30 PROCEDURE — 1101F PR PT FALLS ASSESS DOC 0-1 FALLS W/OUT INJ PAST YR: ICD-10-PCS | Mod: CPTII,S$GLB,, | Performed by: INTERNAL MEDICINE

## 2023-11-30 PROCEDURE — 3079F DIAST BP 80-89 MM HG: CPT | Mod: CPTII,S$GLB,, | Performed by: INTERNAL MEDICINE

## 2023-11-30 PROCEDURE — 3288F PR FALLS RISK ASSESSMENT DOCUMENTED: ICD-10-PCS | Mod: CPTII,S$GLB,, | Performed by: INTERNAL MEDICINE

## 2023-11-30 PROCEDURE — 1126F PR PAIN SEVERITY QUANTIFIED, NO PAIN PRESENT: ICD-10-PCS | Mod: CPTII,S$GLB,, | Performed by: INTERNAL MEDICINE

## 2023-11-30 PROCEDURE — 1159F MED LIST DOCD IN RCRD: CPT | Mod: CPTII,S$GLB,, | Performed by: INTERNAL MEDICINE

## 2023-11-30 PROCEDURE — 1159F PR MEDICATION LIST DOCUMENTED IN MEDICAL RECORD: ICD-10-PCS | Mod: CPTII,S$GLB,, | Performed by: INTERNAL MEDICINE

## 2023-11-30 PROCEDURE — 1160F PR REVIEW ALL MEDS BY PRESCRIBER/CLIN PHARMACIST DOCUMENTED: ICD-10-PCS | Mod: CPTII,S$GLB,, | Performed by: INTERNAL MEDICINE

## 2023-11-30 PROCEDURE — 3288F FALL RISK ASSESSMENT DOCD: CPT | Mod: CPTII,S$GLB,, | Performed by: INTERNAL MEDICINE

## 2023-11-30 PROCEDURE — 99214 OFFICE O/P EST MOD 30 MIN: CPT | Mod: S$GLB,,, | Performed by: INTERNAL MEDICINE

## 2023-11-30 PROCEDURE — 1101F PT FALLS ASSESS-DOCD LE1/YR: CPT | Mod: CPTII,S$GLB,, | Performed by: INTERNAL MEDICINE

## 2023-11-30 PROCEDURE — 3079F PR MOST RECENT DIASTOLIC BLOOD PRESSURE 80-89 MM HG: ICD-10-PCS | Mod: CPTII,S$GLB,, | Performed by: INTERNAL MEDICINE

## 2023-11-30 PROCEDURE — 99214 PR OFFICE/OUTPT VISIT, EST, LEVL IV, 30-39 MIN: ICD-10-PCS | Mod: S$GLB,,, | Performed by: INTERNAL MEDICINE

## 2023-11-30 PROCEDURE — 3074F PR MOST RECENT SYSTOLIC BLOOD PRESSURE < 130 MM HG: ICD-10-PCS | Mod: CPTII,S$GLB,, | Performed by: INTERNAL MEDICINE

## 2023-11-30 PROCEDURE — 1126F AMNT PAIN NOTED NONE PRSNT: CPT | Mod: CPTII,S$GLB,, | Performed by: INTERNAL MEDICINE

## 2023-11-30 PROCEDURE — 99999 PR PBB SHADOW E&M-EST. PATIENT-LVL III: ICD-10-PCS | Mod: PBBFAC,,, | Performed by: INTERNAL MEDICINE

## 2023-11-30 PROCEDURE — 99999 PR PBB SHADOW E&M-EST. PATIENT-LVL III: CPT | Mod: PBBFAC,,, | Performed by: INTERNAL MEDICINE

## 2023-11-30 PROCEDURE — 1160F RVW MEDS BY RX/DR IN RCRD: CPT | Mod: CPTII,S$GLB,, | Performed by: INTERNAL MEDICINE

## 2023-11-30 PROCEDURE — 3074F SYST BP LT 130 MM HG: CPT | Mod: CPTII,S$GLB,, | Performed by: INTERNAL MEDICINE

## 2023-11-30 NOTE — PROGRESS NOTES
[facility] Note    Chief Complaint      Chief Complaint   Patient presents with    Annual Exam       History of Present Illness      Peng Sullivan is a 78 y.o. male who presents today for   Chief Complaint   Patient presents with    Annual Exam   .  Patient comes to appointment here for yearly checkup for chronic issues as below . He is compliant with all meds and diet . He is managing his prostate issues with dr miguel . I have reviewed all labs with patient today . He just received flu and covid booster last month     HPI    No problem-specific Assessment & Plan notes found for this encounter.       Problem List Items Addressed This Visit          Cardiac/Vascular    Mixed hyperlipidemia    Overview      Cont current regimen atorvastatin is tolerating well . All labs reviewed          Benign essential hypertension    Overview     bp well controlled on current regimen . Cont same             Oncology    Prostate cancer - Primary    Overview     Urology dr blount managing             Endocrine    Severe obesity (BMI 35.0-39.9) with comorbidity    Overview     Counciled on diet and exericse              Past Medical History:  Past Medical History:   Diagnosis Date    Degenerative lumbar disc 03/08/2021    Diverticulosis     Hemorrhoids     High cholesterol     History of colon polyps 6/2/99, 6/3/19    Hypertension     Prostate cancer        Past Surgical History:  Past Surgical History:   Procedure Laterality Date    APPENDECTOMY  01/01/1954    COLONOSCOPY  06/03/2019    [facility]    COLONOSCOPY N/A 07/19/2019    Procedure: COLONOSCOPY/pos emr;  Surgeon: Shantanu Lee MD;  Location: [facility] ([address] [place] [address]);  Service: Endoscopy;  Laterality: N/A;  Referred by Dr. BERTO Keyes-see media tab for scanned records-      HT: 5'11, WT: 254.4, BMI: 35.5       Family History:  family history includes Breast cancer in his sister; Cancer in his sister and sister; Cataracts in his sister;  Colon cancer in his sister; Heart attack in his father; Heart disease in his father; No Known Problems in his brother, maternal aunt, maternal grandfather, maternal grandmother, maternal uncle, mother, paternal aunt, paternal grandfather, paternal grandmother, paternal uncle, sister, sister, and sister.     Social History:  Social History     Socioeconomic History    Marital status:    Tobacco Use    Smoking status: Former     Current packs/day: 0.00     Average packs/day: 0.5 packs/day for 30.0 years (15.0 ttl pk-yrs)     Types: Cigarettes     Start date: 1960     Quit date:      Years since quittin.9     Passive exposure: Never    Smokeless tobacco: Former     Types: Chew     Quit date:    Substance and Sexual Activity    Alcohol use: Not Currently     Comment: 12 beers per year.    Drug use: No    Sexual activity: Yes     Partners: Female     Birth control/protection: None     Social Determinants of Health     Financial Resource Strain: Low Risk  (2023)    Overall Financial Resource Strain (CARDIA)     Difficulty of Paying Living Expenses: Not hard at all   Food Insecurity: No Food Insecurity (2023)    Hunger Vital Sign     Worried About Running Out of Food in the Last Year: Never true     Ran Out of Food in the Last Year: Never true   Transportation Needs: No Transportation Needs (2023)    PRAPARE - Transportation     Lack of Transportation (Medical): No     Lack of Transportation (Non-Medical): No   Physical Activity: Sufficiently Active (2023)    Exercise Vital Sign     Days of Exercise per Week: 7 days     Minutes of Exercise per Session: 30 min   Stress: No Stress Concern Present (2023)    [place] [employer] - Occupational Stress Questionnaire     Feeling of Stress : Not at all   Social Connections: Unknown (2023)    Social Connection and Isolation Panel [NHANES]     Frequency of Communication with Friends and Family: More than  three times a week     Frequency of Social Gatherings with Friends and Family: Twice a week     Active Member of Clubs or Organizations: Yes     Attends Club or Organization Meetings: More than 4 times per year     Marital Status:    Housing Stability: Low Risk  (2023)    Housing Stability Vital Sign     Unable to Pay for Housing in the Last Year: No     Number of Places Lived in the Last Year: 1     Unstable Housing in the Last Year: No       Review of Systems:   Review of Systems   Constitutional:  Negative for fever and weight loss.   HENT:  Negative for congestion, hearing loss and sore throat.    Eyes:  Negative for blurred vision.   Respiratory:  Negative for cough and shortness of breath.    Cardiovascular:  Negative for chest pain, palpitations, claudication and leg swelling.   Gastrointestinal:  Negative for abdominal pain, constipation, diarrhea and heartburn.   Genitourinary:  Negative for dysuria.   Musculoskeletal:  Negative for back pain and myalgias.   Skin:  Negative for rash.   Neurological:  Negative for focal weakness and headaches.   Psychiatric/Behavioral:  Negative for depression and suicidal ideas. The patient is not nervous/anxious.         Medications:  Outpatient Encounter Medications as of 2023   Medication Sig Dispense Refill    atorvastatin (LIPITOR) 10 MG tablet TAKE 1 TABLET BY MOUTH ONCE DAILY. 90 tablet 3    cholecalciferol, vitamin D3, (VITAMIN D3) 25 mcg (1,000 unit) capsule Taking every other day      hydroCHLOROthiazide (MICROZIDE) 12.5 mg capsule TAKE 1 CAPSULE BY MOUTH ONCE DAILY. 90 capsule 3    losartan (COZAAR) 50 MG tablet TAKE 1 TABLET BY MOUTH ONCE DAILY. 90 tablet 3    tadalafiL (CIALIS) 10 MG tablet Take 1 tablet (10 mg total) by mouth daily as needed for Erectile Dysfunction. 20 tablet 3    hylan g-f 20 (SYNVISC-ONE) 48 mg/6 mL Syrg 6 mLs.      [] sodium phosphates (FLEET ENEMA) 19-7 gram/118 mL Enem Place 1 enema rectally once. Use at home  "the am of the procedure for 1 dose 1 enema 0    [DISCONTINUED] ciprofloxacin HCl (CIPRO) 500 MG tablet 1 pill one hour before prostate biopsy (Patient not taking: Reported on 11/30/2023) 1 tablet 0     No facility-administered encounter medications on file as of 11/30/2023.       Allergies:  Review of patient's allergies indicates:  No Known Allergies      Physical Exam      Vitals:    11/30/23 0834   BP: 120/84   Pulse: 76   Resp: 18   Temp: 98 °F (36.7 °C)        Vital Signs  Temp: 98 °F (36.7 °C)  Temp Source: Oral  Pulse: 76  Resp: 18  SpO2: 98 %  BP: 120/84  BP Location: Right arm  Patient Position: Sitting  Pain Score: 0-No pain  Height and Weight  Height: 5' 11" (180.3 cm)  Weight: 116.3 kg (256 lb 6.3 oz)  BSA (Calculated - sq m): 2.41 sq meters  BMI (Calculated): 35.8  Weight in (lb) to have BMI = 25: 178.9]     Body mass index is 35.76 kg/m².    Physical Exam  Constitutional:       Appearance: He is well-developed.   HENT:      Head: Normocephalic.   Eyes:      Pupils: Pupils are equal, round, and reactive to light.   Neck:      Thyroid: No thyromegaly.   Cardiovascular:      Rate and Rhythm: Normal rate and regular rhythm.      Heart sounds: No murmur heard.     No friction rub. No gallop.   Pulmonary:      Effort: Pulmonary effort is normal.      Breath sounds: Normal breath sounds.   Abdominal:      General: Bowel sounds are normal.      Palpations: Abdomen is soft.   Musculoskeletal:         General: Normal range of motion.      Cervical back: Normal range of motion.   Skin:     General: Skin is warm and dry.   Neurological:      Mental Status: He is alert and oriented to person, place, and time.      Sensory: No sensory deficit.   Psychiatric:         Behavior: Behavior normal.          Laboratory:  CBC:  Recent Labs   Lab Result Units 11/28/23  0659   WBC K/uL 6.72   RBC M/uL 5.04   Hemoglobin g/dL 15.1   Hematocrit % 45.8   Platelets K/uL 212   MCV fL 91   MCH pg 30.0   MCHC g/dL 33.0 " "    CMP:  Recent Labs   Lab Result Units 11/28/23  0659   Glucose mg/dL 90   Calcium mg/dL 9.7   Albumin g/dL 4.0   Total Protein g/dL 7.1   Sodium mmol/L 141   Potassium mmol/L 4.1   CO2 mmol/L 31*   Chloride mmol/L 103   BUN mg/dL 17   Alkaline Phosphatase U/L 70   ALT U/L 25   AST U/L 21   Total Bilirubin mg/dL 0.7     URINALYSIS:  No results for input(s): "COLORU", "CLARITYU", "SPECGRAV", "PHUR", "PROTEINUA", "GLUCOSEU", "BILIRUBINCON", "BLOODU", "WBCU", "RBCU", "BACTERIA", "MUCUS", "NITRITE", "LEUKOCYTESUR", "UROBILINOGEN", "HYALINECASTS" in the last 2160 hours.   LIPIDS:  Recent Labs   Lab Result Units 11/28/23  0659   HDL mg/dL 57   Cholesterol mg/dL 154   Triglycerides mg/dL 82   LDL Cholesterol mg/dL 80.6   HDL/Cholesterol Ratio % 37.0   Non-HDL Cholesterol mg/dL 97   Total Cholesterol/HDL Ratio  2.7     TSH:  No results for input(s): "TSH" in the last 2160 hours.  A1C:  No results for input(s): "HGBA1C" in the last 2160 hours.    Radiology:        Assessment:     Peng Sullivan is a 78 y.o.male with:    Prostate cancer    Benign essential hypertension    Mixed hyperlipidemia    Severe obesity (BMI 35.0-39.9) with comorbidity                Plan:     Problem List Items Addressed This Visit          Cardiac/Vascular    Mixed hyperlipidemia    Overview      Cont current regimen atorvastatin is tolerating well . All labs reviewed          Benign essential hypertension    Overview     bp well controlled on current regimen . Cont same             Oncology    Prostate cancer - Primary    Overview     Urology dr blount managing             Endocrine    Severe obesity (BMI 35.0-39.9) with comorbidity    Overview     Counciled on diet and exericse             As above, continue current medications and maintain follow up with specialists.  Return to clinic in 6 months.      Frederick W Dantagnan [facility]                  "

## 2023-12-23 NOTE — TELEPHONE ENCOUNTER
No care due was identified.  [facility] Embedded Care Due Messages. Reference number: [id number].   12/23/2023 2:37:04 AM CST

## 2023-12-24 RX ORDER — LOSARTAN POTASSIUM 50 MG/1
50 TABLET ORAL
Qty: 90 TABLET | Refills: 3 | Status: SHIPPED | OUTPATIENT
Start: 2023-12-24

## 2023-12-24 RX ORDER — ATORVASTATIN CALCIUM 10 MG/1
10 TABLET, FILM COATED ORAL
Qty: 90 TABLET | Refills: 3 | Status: SHIPPED | OUTPATIENT
Start: 2023-12-24

## 2023-12-24 NOTE — TELEPHONE ENCOUNTER
Refill Decision Note   Peng Sullivan  is requesting a refill authorization.  Brief Assessment and Rationale for Refill:  Approve     Medication Therapy Plan:         Comments:     Note composed:10:41 AM 12/24/2023

## 2024-04-24 RX ORDER — HYDROCHLOROTHIAZIDE 12.5 MG/1
12.5 CAPSULE ORAL
Qty: 90 CAPSULE | Refills: 2 | Status: SHIPPED | OUTPATIENT
Start: 2024-04-24

## 2024-04-24 NOTE — TELEPHONE ENCOUNTER
No care due was identified.  [facility] Embedded Care Due Messages. Reference number: [id number].   4/24/2024 2:15:55 AM CDT

## 2024-04-24 NOTE — TELEPHONE ENCOUNTER
Refill Decision Note   Peng Sullivan  is requesting a refill authorization.    Brief Assessment and Rationale for Refill:   Approve       Medication Therapy Plan:          Comments:     Note composed:3:13 PM 04/24/2024

## 2024-05-20 ENCOUNTER — LAB VISIT (OUTPATIENT)
Dept: LAB | Facility: HOSPITAL | Age: 79
End: 2024-05-20
Attending: STUDENT IN AN ORGANIZED HEALTH CARE EDUCATION/TRAINING PROGRAM
Payer: MEDICARE

## 2024-05-20 DIAGNOSIS — C61 PROSTATE CANCER: ICD-10-CM

## 2024-05-20 LAB — COMPLEXED PSA SERPL-MCNC: 10.5 NG/ML (ref 0–4)

## 2024-05-20 PROCEDURE — 36415 COLL VENOUS BLD VENIPUNCTURE: CPT | Mod: HCNC | Performed by: STUDENT IN AN ORGANIZED HEALTH CARE EDUCATION/TRAINING PROGRAM

## 2024-05-20 PROCEDURE — 84153 ASSAY OF PSA TOTAL: CPT | Mod: HCNC | Performed by: STUDENT IN AN ORGANIZED HEALTH CARE EDUCATION/TRAINING PROGRAM

## 2024-05-21 ENCOUNTER — PATIENT MESSAGE (OUTPATIENT)
Dept: UROLOGY | Facility: CLINIC | Age: 79
End: 2024-05-21
Payer: MEDICARE

## 2024-05-21 ENCOUNTER — TELEPHONE (OUTPATIENT)
Dept: UROLOGY | Facility: CLINIC | Age: 79
End: 2024-05-21
Payer: MEDICARE

## 2024-05-21 NOTE — TELEPHONE ENCOUNTER
Spoke with patient who was able to provide acceptable patient identifiers prior to start of conversation.   Called patient.  F/U appt scheduled with Dr Felder.

## 2024-05-23 ENCOUNTER — PATIENT MESSAGE (OUTPATIENT)
Dept: PRIMARY CARE CLINIC | Facility: CLINIC | Age: 79
End: 2024-05-23
Payer: MEDICARE

## 2024-05-23 ENCOUNTER — OFFICE VISIT (OUTPATIENT)
Dept: UROLOGY | Facility: CLINIC | Age: 79
End: 2024-05-23
Payer: MEDICARE

## 2024-05-23 VITALS
HEIGHT: 71 IN | SYSTOLIC BLOOD PRESSURE: 127 MMHG | BODY MASS INDEX: 35.49 KG/M2 | DIASTOLIC BLOOD PRESSURE: 79 MMHG | WEIGHT: 253.5 LBS | HEART RATE: 80 BPM

## 2024-05-23 DIAGNOSIS — C61 PROSTATE CANCER: ICD-10-CM

## 2024-05-23 DIAGNOSIS — N30.01 ACUTE CYSTITIS WITH HEMATURIA: Primary | ICD-10-CM

## 2024-05-23 LAB
BACTERIA #/AREA URNS AUTO: NORMAL /HPF
BILIRUB UR QL STRIP: NEGATIVE
CLARITY UR REFRACT.AUTO: CLEAR
COLOR UR AUTO: NORMAL
GLUCOSE UR QL STRIP: NEGATIVE
HGB UR QL STRIP: NEGATIVE
KETONES UR QL STRIP: NEGATIVE
LEUKOCYTE ESTERASE UR QL STRIP: NEGATIVE
MICROSCOPIC COMMENT: NORMAL
NITRITE UR QL STRIP: NEGATIVE
PH UR STRIP: 7 [PH] (ref 5–8)
PROT UR QL STRIP: NEGATIVE
SP GR UR STRIP: 1.01 (ref 1–1.03)
URN SPEC COLLECT METH UR: NORMAL
WBC #/AREA URNS AUTO: 1 /HPF (ref 0–5)

## 2024-05-23 PROCEDURE — 81001 URINALYSIS AUTO W/SCOPE: CPT | Mod: HCNC | Performed by: STUDENT IN AN ORGANIZED HEALTH CARE EDUCATION/TRAINING PROGRAM

## 2024-05-23 PROCEDURE — 99999 PR PBB SHADOW E&M-EST. PATIENT-LVL III: CPT | Mod: PBBFAC,HCNC,, | Performed by: STUDENT IN AN ORGANIZED HEALTH CARE EDUCATION/TRAINING PROGRAM

## 2024-05-23 PROCEDURE — 3078F DIAST BP <80 MM HG: CPT | Mod: HCNC,CPTII,S$GLB, | Performed by: STUDENT IN AN ORGANIZED HEALTH CARE EDUCATION/TRAINING PROGRAM

## 2024-05-23 PROCEDURE — 1126F AMNT PAIN NOTED NONE PRSNT: CPT | Mod: HCNC,CPTII,S$GLB, | Performed by: STUDENT IN AN ORGANIZED HEALTH CARE EDUCATION/TRAINING PROGRAM

## 2024-05-23 PROCEDURE — 1159F MED LIST DOCD IN RCRD: CPT | Mod: HCNC,CPTII,S$GLB, | Performed by: STUDENT IN AN ORGANIZED HEALTH CARE EDUCATION/TRAINING PROGRAM

## 2024-05-23 PROCEDURE — 1101F PT FALLS ASSESS-DOCD LE1/YR: CPT | Mod: HCNC,CPTII,S$GLB, | Performed by: STUDENT IN AN ORGANIZED HEALTH CARE EDUCATION/TRAINING PROGRAM

## 2024-05-23 PROCEDURE — 3074F SYST BP LT 130 MM HG: CPT | Mod: HCNC,CPTII,S$GLB, | Performed by: STUDENT IN AN ORGANIZED HEALTH CARE EDUCATION/TRAINING PROGRAM

## 2024-05-23 PROCEDURE — 99214 OFFICE O/P EST MOD 30 MIN: CPT | Mod: HCNC,S$GLB,, | Performed by: STUDENT IN AN ORGANIZED HEALTH CARE EDUCATION/TRAINING PROGRAM

## 2024-05-23 PROCEDURE — 3288F FALL RISK ASSESSMENT DOCD: CPT | Mod: HCNC,CPTII,S$GLB, | Performed by: STUDENT IN AN ORGANIZED HEALTH CARE EDUCATION/TRAINING PROGRAM

## 2024-05-23 NOTE — PROGRESS NOTES
[facility]  Urologic Oncology Clinic Note        Date of Service: 5/23/2024          Chief Complaint/Reason for Consultation:  Low Risk Prostate Cancer    Requesting Provider:   No referring provider defined for this encounter.      History of Present Illness:   Patient 79 y.o. male presents with hx of low risk prostate cancer.      No family hx of prostate cancer.     Only voiding complaint is daytime frequency. Nocturia 0-1. Some mild ED, not taking PDE5I.     No other new changes in health. Having some trouble with right lower extremity, but no heart attacks or strokes.     At last visit he had UTI w/ ecoli and hematuria.     Today he returns 6 mo surveillance PSA. Today feels good. Doing all his normal activities as he was before he was diagnosed with cancer.       Urologic History:      10/7/2022 -- PSA 7.4  11/4/2022 -- mpMRI prostate -- PV 32cc, PSA 7.4, PSAD 0.23; MARKUS 1 PIRADS 3 Left anterior TZ  11/30/2022 -- UroNav + systematic bx -- LA 3+3 1/1 cores, target negative, all other cores benign  12/29/2022 -- IPSS 13, nocturia 0-1; EUGENE 21  6/2/2023 -- PSA 9.6  11/13/2023 -- PSA 9.4  11/13/2023 -- mpMRI prostate -- PV 31, PSA 9, PSAD 0.3 -- stable 1.8cm PIRADS 3 lesion in left TZ  11/16/2023 -- Uronav + systematic bx -- LLA 3+3, LMA 3+3, LLM 3+3, target 1 3+3  5/20/2024 -- PSA 10.5    Patient Active Problem List    Diagnosis Date Noted    Severe obesity (BMI 35.0-39.9) with comorbidity 11/30/2023    Pain 06/19/2023    Prostate cancer 04/27/2023    Degenerative lumbar disc 03/08/2021    Seasonal allergic rhinitis due to pollen 04/21/2020    Obesity (BMI 30.0-34.9) 03/03/2020    Prostate cancer screening 03/03/2020    Bronchitis 11/19/2019    Mixed hyperlipidemia 08/22/2019    Benign essential hypertension 08/22/2019    Colon adenoma 07/19/2019    Posterior vitreous detachment of both eyes 10/25/2012    Nuclear sclerotic cataract of both eyes 10/25/2012          Review of patient's allergies  indicates:  No Known Allergies       Past Medical History:   Diagnosis Date    Degenerative lumbar disc 2021    Diverticulosis     Hemorrhoids     High cholesterol     History of colon polyps 99, 6/3/19    Hypertension     Prostate cancer       Past Surgical History:   Procedure Laterality Date    APPENDECTOMY  1954    COLONOSCOPY  2019    [facility]    COLONOSCOPY N/A 2019    Procedure: COLONOSCOPY/pos emr;  Surgeon: Shantanu Lee MD;  Location: [facility] ([address] [place] [address]);  Service: Endoscopy;  Laterality: N/A;  Referred by Dr. BERTO Keyes-see media tab for scanned records-      HT: 5'11, WT: 254.4, BMI: 35.5      Family History   Problem Relation Name Age of Onset    Heart attack Father Peng Sr.     Heart disease Father Peng Sr.         Congestive heart failure    No Known Problems Mother      No Known Problems Maternal Aunt      No Known Problems Maternal Uncle      No Known Problems Paternal Aunt      No Known Problems Paternal Uncle      No Known Problems Paternal Grandmother      No Known Problems Paternal Grandfather      No Known Problems Maternal Grandmother      No Known Problems Maternal Grandfather      Cataracts Sister Zeenat     Colon cancer Sister Zeenat     Cancer Sister Zeenat     Breast cancer Sister Keely     Cancer Sister Keely     No Known Problems Sister      No Known Problems Sister      No Known Problems Sister      No Known Problems Brother      Amblyopia Neg Hx      Blindness Neg Hx      Diabetes Neg Hx      Glaucoma Neg Hx      Hypertension Neg Hx      Macular degeneration Neg Hx      Retinal detachment Neg Hx      Strabismus Neg Hx      Stroke Neg Hx      Thyroid disease Neg Hx        Social History     Tobacco Use    Smoking status: Former     Current packs/day: 0.00     Average packs/day: 0.5 packs/day for 30.0 years (15.0 ttl pk-yrs)     Types: Cigarettes     Start date: 1960     Quit date:      Years since quittin.4     Passive  "exposure: Never    Smokeless tobacco: Former     Types: Chew     Quit date: 1990   Substance Use Topics    Alcohol use: Not Currently     Comment: 12 beers per year.        Review of Systems   Constitutional:  Negative for activity change and fatigue.   HENT:  Negative for congestion.    Respiratory:  Negative for cough.    Cardiovascular:  Negative for chest pain.   Gastrointestinal:  Negative for abdominal pain.   Genitourinary:  Positive for dysuria and hematuria. Negative for flank pain.             OBJECTIVE:     Vitals:    05/23/24 0824   BP: 127/79   Pulse: 80   Weight: 115 kg (253 lb 8.5 oz)   Height: 5' 11" (1.803 m)            General Appearance: Alert, cooperative, no distress  Head: Normocephalic  Eyes: Clear conjunctiva  Neck: No obvious LND or JVD  Lungs: Normal chest excursion, no accessory muscle use  Chest: Regular rate rhythm by palpation, no pedal edema  Abdomen: Soft, non-tender, non-distended  Extremities: Atraumatic   Lymph Nodes: No appreciable lymph adenopathy  Neurologic: No gross gait, motor or sensory deficits            LAB:        CMP  Sodium   Date Value Ref Range Status   11/28/2023 141 136 - 145 mmol/L Final     Potassium   Date Value Ref Range Status   11/28/2023 4.1 3.5 - 5.1 mmol/L Final     Chloride   Date Value Ref Range Status   11/28/2023 103 95 - 110 mmol/L Final     CO2   Date Value Ref Range Status   11/28/2023 31 (H) 23 - 29 mmol/L Final     Glucose   Date Value Ref Range Status   11/28/2023 90 70 - 110 mg/dL Final     BUN   Date Value Ref Range Status   11/28/2023 17 8 - 23 mg/dL Final     Creatinine   Date Value Ref Range Status   11/28/2023 0.8 0.5 - 1.4 mg/dL Final     Calcium   Date Value Ref Range Status   11/28/2023 9.7 8.7 - 10.5 mg/dL Final     Total Protein   Date Value Ref Range Status   11/28/2023 7.1 6.0 - 8.4 g/dL Final     Albumin   Date Value Ref Range Status   11/28/2023 4.0 3.5 - 5.2 g/dL Final     Total Bilirubin   Date Value Ref Range Status   11/28/2023 " 0.7 0.1 - 1.0 mg/dL Final     Comment:     For infants and newborns, interpretation of results should be based  on gestational age, weight and in agreement with clinical  observations.    Premature Infant recommended reference ranges:  Up to 24 hours.............<8.0 mg/dL  Up to 48 hours............<12.0 mg/dL  3-5 days..................<15.0 mg/dL  6-29 days.................<15.0 mg/dL       Alkaline Phosphatase   Date Value Ref Range Status   2023 70 55 - 135 U/L Final     AST   Date Value Ref Range Status   2023 21 10 - 40 U/L Final     ALT   Date Value Ref Range Status   2023 25 10 - 44 U/L Final     Anion Gap   Date Value Ref Range Status   2023 7 (L) 8 - 16 mmol/L Final     eGFR   Date Value Ref Range Status   2023 >60.0 >60 mL/min/1.73 m^2 Final     Lab Results   Component Value Date    WBC 6.72 2023    HGB 15.1 2023    HCT 45.8 2023    MCV 91 2023     2023           IMAGIN/13/2023  MRI PROSTATE W W/O CONTRAST       CLINICAL HISTORY:  Prostate cancer, monitor;  Malignant neoplasm of prostate     Additional history: None provided.     TECHNIQUE:  Multiparametric MRI of the prostate/pelvis performed on a 3T scanner with phase pelvic coil. Multiplanar, multisequence images including high resolution, small field-of-view T2-WI; axial diffusion weighted images with multiple B-values and creation of ADC-maps; and dynamic contrast enhanced T1-weighted images through the prostate were obtained before, during, and after the administration of 10 cc intravenous gadolinium.     COMPARISON:  MRI 2022     FINDINGS:  Previous biopsy: Left apex focal adenocarcinoma, [place] score 6 (3+3) 2022     PSA: 9.4 ng/mL 2023     Prior therapy: None     Prostate: 4.7 x 3.8 x 3.8 cm corresponding to a computed volume of 31.4 cc.     Peripheral zone: With bandlike T2 hypointensities, likely reflecting fibrosis and similar to prior.  No  focal lesion concerning for prostate cancer.     Transitional zone: Benign prostatic hyperplasia.  Stable focal lesion in the left transition zone, as follows:     Lesion (MARKUS) #T-1     Location: Side:left; Region: mid; Zone: anterior     Greatest dimension: 1.8 cm     T2-WI: Mostly encapsulated nodule, score 2.     DWI/ADC: Same as 4 but ?1.5 cm in greatest dimension or definite extraprostateic extension/invasive behavior, score 5.     DCE: Positive     Extraprostatic extension: Negative     PI-RADS assessment category: 3     Neurovascular bundle: Normal appearance.     Seminal vesicles: Normal appearance.     Adjacent Organ Involvement: No evidence for urinary bladder or rectal invasion.     Lymphadenopathy: None.     Other Findings: None.     Impression:     Stable 1.8 cm PI-RADS 3 lesion in the left anterior transition zone at the mid gland.     Overall Assessment: PI-RADS 3 - Intermediate (the presence of clinically significant cancer is equivocal)     Number of targets created for potential MR/US fusion biopsy     Peripheral zone: 0     Transition zone: 1     Electronically signed by resident: Yfn Ramos  Date:                                            11/13/2023  Time:                                           16:00     Electronically signed by:Gelacio Anguiano MD  Date:                                            11/14/2023  Time:                                           17:07    ASSESSMENT/PLAN:       78 yo M w/ hx of low risk prostate cancer      Plan:     Prostate Cancer, low risk  Today I reviewed with the patient his urologic history, PSA trend, and imaging. We determined that he still has NCCN low risk prostate cancer. I pointed out that he is still a good candidate for active surveillance , but that at any time he could choose to engage in active treatment. I told him that 50% of men with his disease classification are still on active surveillance 10 years later. I also informed him that the  medical community is unaware of any man dying of prostate cancer with pure Lambert 3+3 disease.     Again I briefly informed him of the potential treatment options if he so chose like radiation, surgery, or ablation.     Through a shared decision making process I encouraged him to continue active surveillance but left the choice up to him. We did talk about some reasons to transition to active treatment such as (1) a greater volume of cancer detected at biopsy, (2) higher grade cancer at biopsy, (3) patient concern or preference.  He currently does NOT fit the above criteria.    Lastly, I told him that I think we should continue on a low intensity protocol of q1yr [place] and PSA. And tumor burden reassessment every 1-3 years or more.    Finally I told the patient that even though we might think the patient is a candidate for AS, understaging and undergrading does occur, and knowledge of whether the patient is actually a good candidate is incomplete.  I could not make any guarantee that the patient was making the right decision or that his cancer may later be cured if the patient eventually opted for treatment. The patient expressed understanding of these issues.     Ultimately, through a shared decision making process he would like to continue AS.     PSA in 6 months    Gross Hematuria  -Reported hematuria in past at time of UTI which was treated  -Discussed risks and benefits of cystoscopy   -Will repeat UA + UA microscopy today to see if he still has hematuria      Visit in 6 months.        The total time for the established patient visit was at least 30 minutes in both face-to-face and non-face-to-face activities, which included chart review, interpretation of results, counseling, education, ordering meds/tests/procedures, and/or coordination of care.      Maury Cartagena MD  Urologic Oncology  P: [phone]

## 2024-05-30 ENCOUNTER — OFFICE VISIT (OUTPATIENT)
Dept: PRIMARY CARE CLINIC | Facility: CLINIC | Age: 79
End: 2024-05-30
Payer: MEDICARE

## 2024-05-30 VITALS
RESPIRATION RATE: 18 BRPM | HEIGHT: 71 IN | BODY MASS INDEX: 35.4 KG/M2 | OXYGEN SATURATION: 95 % | TEMPERATURE: 97 F | HEART RATE: 88 BPM | SYSTOLIC BLOOD PRESSURE: 120 MMHG | WEIGHT: 252.88 LBS | DIASTOLIC BLOOD PRESSURE: 78 MMHG

## 2024-05-30 DIAGNOSIS — E66.01 SEVERE OBESITY (BMI 35.0-39.9) WITH COMORBIDITY: ICD-10-CM

## 2024-05-30 DIAGNOSIS — I10 BENIGN ESSENTIAL HYPERTENSION: ICD-10-CM

## 2024-05-30 DIAGNOSIS — E78.2 MIXED HYPERLIPIDEMIA: ICD-10-CM

## 2024-05-30 DIAGNOSIS — C61 PROSTATE CANCER: Primary | ICD-10-CM

## 2024-05-30 PROCEDURE — 99214 OFFICE O/P EST MOD 30 MIN: CPT | Mod: S$GLB,,, | Performed by: INTERNAL MEDICINE

## 2024-05-30 PROCEDURE — 1126F AMNT PAIN NOTED NONE PRSNT: CPT | Mod: CPTII,S$GLB,, | Performed by: INTERNAL MEDICINE

## 2024-05-30 PROCEDURE — 99999 PR PBB SHADOW E&M-EST. PATIENT-LVL III: CPT | Mod: PBBFAC,,, | Performed by: INTERNAL MEDICINE

## 2024-05-30 PROCEDURE — 3288F FALL RISK ASSESSMENT DOCD: CPT | Mod: CPTII,S$GLB,, | Performed by: INTERNAL MEDICINE

## 2024-05-30 PROCEDURE — 3074F SYST BP LT 130 MM HG: CPT | Mod: CPTII,S$GLB,, | Performed by: INTERNAL MEDICINE

## 2024-05-30 PROCEDURE — 3078F DIAST BP <80 MM HG: CPT | Mod: CPTII,S$GLB,, | Performed by: INTERNAL MEDICINE

## 2024-05-30 PROCEDURE — 1160F RVW MEDS BY RX/DR IN RCRD: CPT | Mod: CPTII,S$GLB,, | Performed by: INTERNAL MEDICINE

## 2024-05-30 PROCEDURE — 1101F PT FALLS ASSESS-DOCD LE1/YR: CPT | Mod: CPTII,S$GLB,, | Performed by: INTERNAL MEDICINE

## 2024-05-30 PROCEDURE — 1159F MED LIST DOCD IN RCRD: CPT | Mod: CPTII,S$GLB,, | Performed by: INTERNAL MEDICINE

## 2024-05-30 NOTE — PROGRESS NOTES
[facility] Clinic Note    Chief Complaint      Chief Complaint   Patient presents with    Follow-up     6m       History of Present Illness      Peng Sullivan is a 79 y.o. male who presents today for   Chief Complaint   Patient presents with    Follow-up     6m   .  Patient comes to appointment here for 6m checkup for chronic issues as below . He is currently seeing dr groves for prostate cancer . He had full labs done 11/23 . He is current complaining of some hamstring muscle pain , but is doing ok .     HPI    No problem-specific Assessment & Plan notes found for this encounter.       Problem List Items Addressed This Visit          Cardiac/Vascular    Mixed hyperlipidemia    Overview      Cont current regimen atorvastatin is tolerating well . All labs reviewed          Benign essential hypertension    Overview     bp well controlled on current regimen . Cont same             Oncology    Prostate cancer - Primary    Overview     Urology dr blount managing             Endocrine    Severe obesity (BMI 35.0-39.9) with comorbidity    Overview     Counciled on diet and exericse              Past Medical History:  Past Medical History:   Diagnosis Date    Degenerative lumbar disc 03/08/2021    Diverticulosis     Hemorrhoids     High cholesterol     History of colon polyps 6/2/99, 6/3/19    Hypertension     Prostate cancer        Past Surgical History:  Past Surgical History:   Procedure Laterality Date    APPENDECTOMY  01/01/1954    COLONOSCOPY  06/03/2019    [facility]    COLONOSCOPY N/A 07/19/2019    Procedure: COLONOSCOPY/pos emr;  Surgeon: Shantanu Lee MD;  Location: [facility] ([address] [place] [address]);  Service: Endoscopy;  Laterality: N/A;  Referred by Dr. BERTO Keyes-see media tab for scanned records-      HT: 5'11, WT: 254.4, BMI: 35.5       Family History:  family history includes Breast cancer in his sister; Cancer in his sister and sister; Cataracts in his sister; Colon cancer in his  sister; Heart attack in his father; Heart disease in his father; No Known Problems in his brother, maternal aunt, maternal grandfather, maternal grandmother, maternal uncle, mother, paternal aunt, paternal grandfather, paternal grandmother, paternal uncle, sister, sister, and sister.     Social History:  Social History     Socioeconomic History    Marital status:    Tobacco Use    Smoking status: Former     Current packs/day: 0.00     Average packs/day: 0.5 packs/day for 30.0 years (15.0 ttl pk-yrs)     Types: Cigarettes     Start date: 1960     Quit date:      Years since quittin.4     Passive exposure: Never    Smokeless tobacco: Former     Types: Chew     Quit date:    Substance and Sexual Activity    Alcohol use: Not Currently     Comment: 12 beers per year.    Drug use: No    Sexual activity: Yes     Partners: Female     Birth control/protection: None     Social Determinants of Health     Financial Resource Strain: Low Risk  (2023)    Overall Financial Resource Strain (CARDIA)     Difficulty of Paying Living Expenses: Not hard at all   Food Insecurity: No Food Insecurity (2023)    Hunger Vital Sign     Worried About Running Out of Food in the Last Year: Never true     Ran Out of Food in the Last Year: Never true   Transportation Needs: No Transportation Needs (2023)    PRAPARE - Transportation     Lack of Transportation (Medical): No     Lack of Transportation (Non-Medical): No   Physical Activity: Sufficiently Active (2023)    Exercise Vital Sign     Days of Exercise per Week: 7 days     Minutes of Exercise per Session: 30 min   Stress: No Stress Concern Present (2023)    [employer] - Occupational Stress Questionnaire     Feeling of Stress : Not at all   Housing Stability: Low Risk  (2023)    Housing Stability Vital Sign     Unable to Pay for Housing in the Last Year: No     Number of Places Lived in the Last Year: 1      "Unstable Housing in the Last Year: No       Review of Systems:   Review of Systems   Constitutional:  Negative for fever and weight loss.   HENT:  Negative for congestion, hearing loss and sore throat.    Eyes:  Negative for blurred vision.   Respiratory:  Negative for cough and shortness of breath.    Cardiovascular:  Negative for chest pain, palpitations, claudication and leg swelling.   Gastrointestinal:  Negative for abdominal pain, constipation, diarrhea and heartburn.   Genitourinary:  Negative for dysuria.   Musculoskeletal:  Negative for back pain and myalgias.   Skin:  Negative for rash.   Neurological:  Negative for focal weakness and headaches.   Psychiatric/Behavioral:  Negative for depression and suicidal ideas. The patient is not nervous/anxious.         Medications:  Outpatient Encounter Medications as of 5/30/2024   Medication Sig Dispense Refill    atorvastatin (LIPITOR) 10 MG tablet TAKE 1 TABLET EVERY DAY 90 tablet 3    hydroCHLOROthiazide (MICROZIDE) 12.5 mg capsule TAKE 1 CAPSULE EVERY DAY 90 capsule 2    hylan g-f 20 (SYNVISC-ONE) 48 mg/6 mL Syrg 6 mLs.      losartan (COZAAR) 50 MG tablet TAKE 1 TABLET EVERY DAY 90 tablet 3    tadalafiL (CIALIS) 10 MG tablet Take 1 tablet (10 mg total) by mouth daily as needed for Erectile Dysfunction. 20 tablet 3    cholecalciferol, vitamin D3, (VITAMIN D3) 25 mcg (1,000 unit) capsule Taking every other day (Patient not taking: Reported on 5/30/2024)       No facility-administered encounter medications on file as of 5/30/2024.       Allergies:  Review of patient's allergies indicates:  No Known Allergies      Physical Exam      Vitals:    05/30/24 1306   BP: 120/78   Pulse: 88   Resp: 18   Temp: 97 °F (36.1 °C)        Vital Signs  Temp: 97 °F (36.1 °C)  Temp Source: Oral  Pulse: 88  Resp: 18  SpO2: 95 %  BP: 120/78  BP Location: Right arm  Patient Position: Sitting  Pain Score: 0-No pain  Height and Weight  Height: 5' 11" (180.3 cm)  Weight: 114.7 kg (252 lb " "13.9 oz)  BSA (Calculated - sq m): 2.4 sq meters  BMI (Calculated): 35.3  Weight in (lb) to have BMI = 25: 178.9]     Body mass index is 35.27 kg/m².    Physical Exam     Laboratory:  CBC:  No results for input(s): "WBC", "RBC", "HGB", "HCT", "PLT", "MCV", "MCH", "MCHC" in the last 2160 hours.  CMP:  No results for input(s): "GLU", "CALCIUM", "ALBUMIN", "PROT", "NA", "K", "CO2", "CL", "BUN", "ALKPHOS", "ALT", "AST", "BILITOT" in the last 2160 hours.    Invalid input(s): "CREATININ"  URINALYSIS:  Recent Labs   Lab Result Units 05/23/24  0854   Color, UA  Straw   Specific Corea, UA  1.010   pH, UA  7.0   Protein, UA  Negative   Bacteria /hpf Rare   Nitrite, UA  Negative   Leukocytes, UA  Negative      LIPIDS:  No results for input(s): "TSH", "HDL", "CHOL", "TRIG", "LDLCALC", "CHOLHDL", "NONHDLCHOL", "TOTALCHOLEST" in the last 2160 hours.  TSH:  No results for input(s): "TSH" in the last 2160 hours.  A1C:  No results for input(s): "HGBA1C" in the last 2160 hours.    Radiology:        Assessment:     Peng Sullivan is a 79 y.o.male with:    Prostate cancer    Mixed hyperlipidemia    Severe obesity (BMI 35.0-39.9) with comorbidity    Benign essential hypertension                Plan:     Problem List Items Addressed This Visit          Cardiac/Vascular    Mixed hyperlipidemia    Overview      Cont current regimen atorvastatin is tolerating well . All labs reviewed          Benign essential hypertension    Overview     bp well controlled on current regimen . Cont same             Oncology    Prostate cancer - Primary    Overview     Urology dr blount managing             Endocrine    Severe obesity (BMI 35.0-39.9) with comorbidity    Overview     Counciled on diet and exericse             As above, continue current medications and maintain follow up with specialists.  Return to clinic in 6 months.      Frederick W Dantagnan [facility]                  "

## 2024-06-11 ENCOUNTER — TELEPHONE (OUTPATIENT)
Dept: PODIATRY | Facility: CLINIC | Age: 79
End: 2024-06-11
Payer: MEDICARE

## 2024-06-12 ENCOUNTER — OFFICE VISIT (OUTPATIENT)
Dept: PODIATRY | Facility: CLINIC | Age: 79
End: 2024-06-12
Payer: MEDICARE

## 2024-06-12 ENCOUNTER — HOSPITAL ENCOUNTER (OUTPATIENT)
Dept: RADIOLOGY | Facility: HOSPITAL | Age: 79
Discharge: HOME | End: 2024-06-12
Attending: PODIATRIST
Payer: MEDICARE

## 2024-06-12 VITALS
SYSTOLIC BLOOD PRESSURE: 119 MMHG | RESPIRATION RATE: 18 BRPM | BODY MASS INDEX: 35.42 KG/M2 | HEART RATE: 65 BPM | DIASTOLIC BLOOD PRESSURE: 74 MMHG | WEIGHT: 253 LBS | HEIGHT: 71 IN

## 2024-06-12 DIAGNOSIS — M77.41 METATARSALGIA, RIGHT FOOT: Primary | ICD-10-CM

## 2024-06-12 DIAGNOSIS — M77.41 METATARSALGIA, RIGHT FOOT: ICD-10-CM

## 2024-06-12 DIAGNOSIS — M79.671 RIGHT FOOT PAIN: ICD-10-CM

## 2024-06-12 PROCEDURE — 99999 PR PBB SHADOW E&M-EST. PATIENT-LVL III: CPT | Mod: PBBFAC,HCNC,, | Performed by: PODIATRIST

## 2024-06-12 PROCEDURE — 73630 X-RAY EXAM OF FOOT: CPT | Mod: 26,HCNC,RT, | Performed by: RADIOLOGY

## 2024-06-12 PROCEDURE — 73630 X-RAY EXAM OF FOOT: CPT | Mod: TC,HCNC,RT

## 2024-06-12 PROCEDURE — 99214 OFFICE O/P EST MOD 30 MIN: CPT | Mod: S$GLB,,, | Performed by: PODIATRIST

## 2024-06-12 PROCEDURE — 1125F AMNT PAIN NOTED PAIN PRSNT: CPT | Mod: CPTII,S$GLB,, | Performed by: PODIATRIST

## 2024-06-12 PROCEDURE — 3074F SYST BP LT 130 MM HG: CPT | Mod: CPTII,S$GLB,, | Performed by: PODIATRIST

## 2024-06-12 PROCEDURE — 1159F MED LIST DOCD IN RCRD: CPT | Mod: CPTII,S$GLB,, | Performed by: PODIATRIST

## 2024-06-12 PROCEDURE — 3078F DIAST BP <80 MM HG: CPT | Mod: CPTII,S$GLB,, | Performed by: PODIATRIST

## 2024-06-12 PROCEDURE — 1160F RVW MEDS BY RX/DR IN RCRD: CPT | Mod: CPTII,S$GLB,, | Performed by: PODIATRIST

## 2024-06-12 RX ORDER — MELOXICAM 15 MG/1
15 TABLET ORAL DAILY
Qty: 20 TABLET | Refills: 0 | Status: SHIPPED | OUTPATIENT
Start: 2024-06-12 | End: 2024-07-02

## 2024-06-12 NOTE — PROGRESS NOTES
Subjective:      Patient ID: Peng Sullivan is a 79 y.o. male.    Chief Complaint:   Foot Pain (RT foot 2nd -3rd toe pain top ans bottom )    Peng is a 79 y.o. male who presents to the podiatry clinic  with complaint of  right foot pain    Pt new to me.  Started again a few weeks ago. No injury.  Likes to walk in neighborhood for exercise.   Was wearing a pair of worn asics... bought a wider pair of new balance recently... seems to have helped.  No significant swelling    Treated by Dr. Joseph in 2023 for neuroma. 2 injections  Xrays negative   Past Medical History:   Diagnosis Date    Degenerative lumbar disc 03/08/2021    Diverticulosis     Hemorrhoids     High cholesterol     History of colon polyps 6/2/99, 6/3/19    Hypertension     Prostate cancer      Past Surgical History:   Procedure Laterality Date    APPENDECTOMY  01/01/1954    COLONOSCOPY  06/03/2019    [facility]    COLONOSCOPY N/A 07/19/2019    Procedure: COLONOSCOPY/pos emr;  Surgeon: Shantanu Lee MD;  Location: [facility] ([address] [place] [address]);  Service: Endoscopy;  Laterality: N/A;  Referred by Dr. BERTO Keyes-see media tab for scanned records-      HT: 5'11, WT: 254.4, BMI: 35.5     Current Outpatient Medications on File Prior to Visit   Medication Sig Dispense Refill    atorvastatin (LIPITOR) 10 MG tablet TAKE 1 TABLET EVERY DAY 90 tablet 3    hydroCHLOROthiazide (MICROZIDE) 12.5 mg capsule TAKE 1 CAPSULE EVERY DAY 90 capsule 2    hylan g-f 20 (SYNVISC-ONE) 48 mg/6 mL Syrg 6 mLs.      losartan (COZAAR) 50 MG tablet TAKE 1 TABLET EVERY DAY 90 tablet 3    tadalafiL (CIALIS) 10 MG tablet Take 1 tablet (10 mg total) by mouth daily as needed for Erectile Dysfunction. 20 tablet 3    cholecalciferol, vitamin D3, (VITAMIN D3) 25 mcg (1,000 unit) capsule Taking every other day (Patient not taking: Reported on 6/12/2024)       No current facility-administered medications on file prior to visit.     Review of patient's allergies indicates:  No Known  "Allergies    Review of Systems   Constitutional: Negative for chills, decreased appetite, fever, malaise/fatigue, night sweats, weight gain and weight loss.   Cardiovascular:  Negative for chest pain, claudication, dyspnea on exertion, leg swelling, palpitations and syncope.   Respiratory:  Negative for cough and shortness of breath.    Endocrine: Negative for cold intolerance and heat intolerance.   Hematologic/Lymphatic: Negative for bleeding problem. Does not bruise/bleed easily.   Skin:  Negative for color change, dry skin, flushing, itching, nail changes, poor wound healing, rash, skin cancer, suspicious lesions and unusual hair distribution.   Musculoskeletal:  Negative for arthritis, back pain, falls, gout, joint pain, joint swelling, muscle cramps, muscle weakness, myalgias, neck pain and stiffness.        Foot pain   Gastrointestinal:  Negative for diarrhea, nausea and vomiting.   Neurological:  Negative for dizziness, focal weakness, light-headedness, numbness, paresthesias, tremors, vertigo and weakness.   Psychiatric/Behavioral:  Negative for altered mental status and depression. The patient does not have insomnia.    Allergic/Immunologic: Negative.            Objective:       Vitals:    06/12/24 0807   BP: 119/74   Pulse: 65   Resp: 18   Weight: 114.8 kg (253 lb)   Height: 5' 11" (1.803 m)   PainSc:   6   PainLoc: Foot   114.8 kg (253 lb)     Physical Exam  Vitals reviewed.   Constitutional:       General: He is not in acute distress.     Appearance: He is well-developed. He is not ill-appearing, toxic-appearing or diaphoretic.      Comments: Proper supportive shoegear      Cardiovascular:      Pulses:           Dorsalis pedis pulses are 2+ on the right side and 2+ on the left side.        Posterior tibial pulses are 2+ on the right side and 2+ on the left side.   Musculoskeletal:         General: No swelling.      Right lower leg: Edema (mild midfoot edema) present.      Left lower leg: No edema.      " Right ankle: Normal.      Right Achilles Tendon: Normal.      Left ankle: Normal.      Left Achilles Tendon: Normal.      Right foot: Decreased range of motion. Deformity, bunion, prominent metatarsal heads and tenderness present. No bony tenderness.      Left foot: Decreased range of motion. Deformity, bunion and prominent metatarsal heads present. No tenderness or bony tenderness.      Comments: + Pop to 2nd MPTJ, 2nd/3rd IS right    Negative vibratory pain    No pain with digital rom     Strength 5/5 b/l   Feet:      Right foot:      Protective Sensation: 10 sites tested.  10 sites sensed.      Skin integrity: No ulcer, blister, skin breakdown, erythema, warmth, callus or dry skin.      Toenail Condition: Right toenails are normal.      Left foot:      Protective Sensation: 10 sites tested.  10 sites sensed.      Skin integrity: No ulcer, blister, skin breakdown, erythema, warmth, callus or dry skin.      Toenail Condition: Left toenails are normal.      Comments: No open lesions or soi      Skin:     General: Skin is warm.      Capillary Refill: Capillary refill takes 2 to 3 seconds.      Coloration: Skin is not pale.      Findings: No erythema or rash.      Nails: There is no clubbing.   Neurological:      Mental Status: He is alert and oriented to person, place, and time.      Sensory: No sensory deficit.      Gait: Gait is intact.   Psychiatric:         Attention and Perception: Attention normal.         Mood and Affect: Mood normal.         Speech: Speech normal.         Behavior: Behavior normal.         Thought Content: Thought content normal.         Cognition and Memory: Cognition normal.         Judgment: Judgment normal.               Assessment:       Encounter Diagnoses   Name Primary?    Metatarsalgia, right foot Yes    Right foot pain          Plan:       Peng was seen today for foot pain.    Diagnoses and all orders for this visit:    Metatarsalgia, right foot  -     X-Ray Foot Complete Right;  Future  -     MRI Foot (Forefoot) Right Without Contrast; Future    Right foot pain  -     X-Ray Foot Complete Right; Future  -     MRI Foot (Forefoot) Right Without Contrast; Future    Other orders  -     meloxicam (MOBIC) 15 MG tablet; Take 1 tablet (15 mg total) by mouth once daily. for 20 days      I counseled the patient on his conditions, their implications and medical management.  Chart review  - chronic foot pain... ddx neuroma vs. Plantar plate tear    - Mobic prescribed. Patient was instructed on dosing information. Discontinue if adverse effects occur      - continue supportive shoes    -recommend repeat x-rays and order MRI    -consider custom-molded orthotics, repeat injection, surgical consult            Follow up if symptoms worsen or fail to improve.

## 2024-07-05 ENCOUNTER — PATIENT MESSAGE (OUTPATIENT)
Dept: PODIATRY | Facility: CLINIC | Age: 79
End: 2024-07-05
Payer: MEDICARE

## 2024-07-05 ENCOUNTER — HOSPITAL ENCOUNTER (OUTPATIENT)
Dept: RADIOLOGY | Facility: HOSPITAL | Age: 79
Discharge: HOME | End: 2024-07-05
Attending: PODIATRIST
Payer: MEDICARE

## 2024-07-05 DIAGNOSIS — M77.41 METATARSALGIA, RIGHT FOOT: ICD-10-CM

## 2024-07-05 DIAGNOSIS — M79.671 RIGHT FOOT PAIN: ICD-10-CM

## 2024-07-05 PROCEDURE — 73718 MRI LOWER EXTREMITY W/O DYE: CPT | Mod: TC,RT

## 2024-07-05 PROCEDURE — 73718 MRI LOWER EXTREMITY W/O DYE: CPT | Mod: 26,RT,, | Performed by: RADIOLOGY

## 2024-07-17 ENCOUNTER — OFFICE VISIT (OUTPATIENT)
Dept: PODIATRY | Facility: CLINIC | Age: 79
End: 2024-07-17
Payer: MEDICARE

## 2024-07-17 VITALS
SYSTOLIC BLOOD PRESSURE: 120 MMHG | HEART RATE: 69 BPM | HEIGHT: 71 IN | TEMPERATURE: 99 F | WEIGHT: 253.06 LBS | BODY MASS INDEX: 35.43 KG/M2 | DIASTOLIC BLOOD PRESSURE: 75 MMHG

## 2024-07-17 DIAGNOSIS — M79.2 NEUROGENIC PAIN OF RIGHT LOWER EXTREMITY: Primary | ICD-10-CM

## 2024-07-17 PROCEDURE — 3078F DIAST BP <80 MM HG: CPT | Mod: CPTII,S$GLB,, | Performed by: STUDENT IN AN ORGANIZED HEALTH CARE EDUCATION/TRAINING PROGRAM

## 2024-07-17 PROCEDURE — 1125F AMNT PAIN NOTED PAIN PRSNT: CPT | Mod: CPTII,S$GLB,, | Performed by: STUDENT IN AN ORGANIZED HEALTH CARE EDUCATION/TRAINING PROGRAM

## 2024-07-17 PROCEDURE — 99999 PR PBB SHADOW E&M-EST. PATIENT-LVL III: CPT | Mod: PBBFAC,,, | Performed by: STUDENT IN AN ORGANIZED HEALTH CARE EDUCATION/TRAINING PROGRAM

## 2024-07-17 PROCEDURE — 1101F PT FALLS ASSESS-DOCD LE1/YR: CPT | Mod: CPTII,S$GLB,, | Performed by: STUDENT IN AN ORGANIZED HEALTH CARE EDUCATION/TRAINING PROGRAM

## 2024-07-17 PROCEDURE — 3074F SYST BP LT 130 MM HG: CPT | Mod: CPTII,S$GLB,, | Performed by: STUDENT IN AN ORGANIZED HEALTH CARE EDUCATION/TRAINING PROGRAM

## 2024-07-17 PROCEDURE — 3288F FALL RISK ASSESSMENT DOCD: CPT | Mod: CPTII,S$GLB,, | Performed by: STUDENT IN AN ORGANIZED HEALTH CARE EDUCATION/TRAINING PROGRAM

## 2024-07-17 PROCEDURE — 1159F MED LIST DOCD IN RCRD: CPT | Mod: CPTII,S$GLB,, | Performed by: STUDENT IN AN ORGANIZED HEALTH CARE EDUCATION/TRAINING PROGRAM

## 2024-07-17 PROCEDURE — 99214 OFFICE O/P EST MOD 30 MIN: CPT | Mod: S$GLB,,, | Performed by: STUDENT IN AN ORGANIZED HEALTH CARE EDUCATION/TRAINING PROGRAM

## 2024-07-17 RX ORDER — DEXAMETHASONE 4 MG/1
4 TABLET ORAL DAILY
Qty: 14 TABLET | Refills: 0 | Status: SHIPPED | OUTPATIENT
Start: 2024-07-17

## 2024-07-17 NOTE — PROGRESS NOTES
Subjective:     Patient    Peng Sullivna is a 79 y.o. male.    Problem    Seen by Dr Mcnamara 1 month ago for right foot pain; had 2 injections for suspected neuromas by Dr Joseph in 2023. Recently had an MRI which shows arthritis and other findings at 1st and 5th rays, but foot pain is primarily at central rays. He has had progressive RLE symptoms over the past year or so, including hamstrings pain and knee pain. Has a migrating/spreading pain in the right forefoot originally near the 2nd toe but now extending to 3rd and 4th toes, with burning. Has chronic low back and neck pain, sees a chiropractor.      History    History obtained from patient and review of medical records.     Past Medical History:   Diagnosis Date    Degenerative lumbar disc 03/08/2021    Diverticulosis     Hemorrhoids     High cholesterol     History of colon polyps 6/2/99, 6/3/19    Hypertension     Prostate cancer        Past Surgical History:   Procedure Laterality Date    APPENDECTOMY  01[birth year]01/1954    COLONOSCOPY  06/03/2019    [facility]    COLONOSCOPY N/A 07/19/2019    Procedure: COLONOSCOPY/pos emr;  Surgeon: Shantanu Lee MD;  Location: [facility] ([address] [place] [address]);  Service: Endoscopy;  Laterality: N/A;  Referred by Dr. BERTO Keyes-see media tab for scanned records-      HT: 5'11, WT: 254.4, BMI: 35.5        Objective:     Vitals  Wt Readings from Last 1 Encounters:   07/17/24 114.8 kg (253 lb 1.4 oz)     Temp Readings from Last 1 Encounters:   07/17/24 98.6 °F (37 °C) (Oral)     BP Readings from Last 1 Encounters:   07/17/24 120/75     Pulse Readings from Last 1 Encounters:   07/17/24 69       Dermatological Exam    Skin:  Pedal hair growth, skin color, and skin texture normal on right  Pedal hair growth, skin color, and skin texture normal on left    Nails:  All nails normal in length, thickness, color    Vascular Exam    Arteries:  Posterior tibial artery palpable on right  Dorsalis pedis artery palpable on  right  Posterior tibial artery palpable on left  Dorsalis pedis artery palpable on left    Veins:  Superficial veins unremarkable on right  Superficial veins unremarkable on left    Swelling:  None on right  None on left    Neurological Exam    [place] touch test:  6/6 sites sensed, light touch intact    Provocation Sign:  + at tibial nerve, 1st common plantar digital nerve, 2nd common plantar digital nerve, 3rd common plantar digital nerve, and 4th common plantar digital nerve on right     Musculoskeletal Exam    Footwear:  Athletic on right  Athletic on left    Gait Exam:   Ambulatory Status: Ambulatory  Gait: Normal  Assistive Devices: None    Foot Progression Angle:  Normal on right  Normal on left    Right Lower Extremity Additional Findings:  Right foot and ankle function, strength, and range of motion unremarkable except as noted above.     Left Lower Extremity Additional Findings:  Left foot and ankle function, strength, and range of motion unremarkable except as noted above.    Imaging and Other Tests    Imaging:  Independently reviewed and interpreted imaging, findings are as follows:     07/05/24 right foot MRI: degenerative findings not consistent with current symptoms; diffuse forefoot muscular atrophy consistent with neuropathy     Assessment:     Encounter Diagnosis   Name Primary?    Neurogenic pain of right lower extremity Yes        Plan:     I counseled the patient on his conditions, their implications and medical management.    RLE neurogenic pain: chronic stable  -MRI reviewed: unremarkable except muscular atrophy indicating a nerve component.   -Suspect spinal origin.   -Dexamethasone.   -Discussed nerve medications, spinal PT.     Return to clinic in 1-2 months, call sooner PRN.

## 2024-07-18 ENCOUNTER — PATIENT MESSAGE (OUTPATIENT)
Dept: UROLOGY | Facility: CLINIC | Age: 79
End: 2024-07-18
Payer: MEDICARE

## 2024-07-29 ENCOUNTER — OFFICE VISIT (OUTPATIENT)
Dept: URGENT CARE | Facility: CLINIC | Age: 79
End: 2024-07-29
Payer: MEDICARE

## 2024-07-29 VITALS
WEIGHT: 250 LBS | HEART RATE: 65 BPM | BODY MASS INDEX: 34.87 KG/M2 | SYSTOLIC BLOOD PRESSURE: 130 MMHG | DIASTOLIC BLOOD PRESSURE: 79 MMHG | TEMPERATURE: 97 F | RESPIRATION RATE: 16 BRPM | OXYGEN SATURATION: 97 %

## 2024-07-29 DIAGNOSIS — J06.9 URI WITH COUGH AND CONGESTION: Primary | ICD-10-CM

## 2024-07-29 DIAGNOSIS — R09.81 NASAL CONGESTION: ICD-10-CM

## 2024-07-29 LAB
CTP QC/QA: YES
SARS-COV-2 AG RESP QL IA.RAPID: NEGATIVE

## 2024-07-29 PROCEDURE — 99213 OFFICE O/P EST LOW 20 MIN: CPT | Mod: S$GLB,,, | Performed by: FAMILY MEDICINE

## 2024-07-29 PROCEDURE — 87811 SARS-COV-2 COVID19 W/OPTIC: CPT | Mod: QW,S$GLB,, | Performed by: FAMILY MEDICINE

## 2024-07-29 RX ORDER — FLUTICASONE PROPIONATE 50 MCG
1 SPRAY, SUSPENSION (ML) NASAL DAILY
Qty: 9.9 ML | Refills: 0 | Status: SHIPPED | OUTPATIENT
Start: 2024-07-29

## 2024-07-29 RX ORDER — PROMETHAZINE HYDROCHLORIDE AND DEXTROMETHORPHAN HYDROBROMIDE 6.25; 15 MG/5ML; MG/5ML
5 SYRUP ORAL NIGHTLY PRN
Qty: 118 ML | Refills: 0 | Status: SHIPPED | OUTPATIENT
Start: 2024-07-29 | End: 2024-08-08

## 2024-07-29 NOTE — PROGRESS NOTES
Subjective:      Patient ID: Peng Sullivan is a 79 y.o. male.    Vitals:  weight is 113.4 kg (250 lb). His oral temperature is 97.4 °F (36.3 °C). His blood pressure is 130/79 and his pulse is 65. His respiration is 16 and oxygen saturation is 97%.     Chief Complaint: Sinus Problem    This is a 79 y.o. male who presents today with a chief complaint of productive cough, nasal congestion (especially at night), rhinorrhea x 7 days with waxing and waning sx. No ear px, ear congestion, nausea, vomiting, diarrhea, abd px, fever, sore throat or headache.    Wife with URI sx    Home tx: mucinex (twice a day with water)    PPMH: none    Cough  This is a new problem. The current episode started in the past 7 days. The problem has been waxing and waning. The problem occurs every few hours. The cough is Productive of sputum. Associated symptoms include nasal congestion and rhinorrhea. Pertinent negatives include no chest pain, chills, ear congestion, ear pain, fever, headaches, myalgias, postnasal drip or sore throat. There is no history of asthma, bronchitis, environmental allergies or pneumonia.       Constitution: Negative for chills and fever.   HENT:  Negative for ear pain, postnasal drip and sore throat.    Cardiovascular:  Negative for chest pain.   Respiratory:  Positive for cough.    Musculoskeletal:  Negative for muscle ache.   Allergic/Immunologic: Negative for environmental allergies.   Neurological:  Negative for headaches.      Objective:     Physical Exam   Constitutional: He does not appear ill. No distress. obesity  HENT:   Nose: Congestion present. No rhinorrhea.   Mouth/Throat: Mucous membranes are moist. No posterior oropharyngeal erythema.   Neck: Neck supple.   Cardiovascular: Normal rate, regular rhythm, normal heart sounds and normal pulses.   Pulmonary/Chest: Effort normal and breath sounds normal.   Abdominal: Normal appearance.   Neurological: He is alert.   Nursing note and vitals  reviewed.    Results for orders placed or performed in visit on 07/29/24   SARS Coronavirus 2 Antigen, POCT Manual Read   Result Value Ref Range    SARS Coronavirus 2 Antigen Negative Negative     Acceptable Yes         Assessment:     1. URI with cough and congestion    2. Nasal congestion        Plan:       URI with cough and congestion  -     fluticasone propionate (FLONASE) 50 mcg/actuation nasal spray; 1 spray (50 mcg total) by Each Nostril route once daily.  Dispense: 9.9 mL; Refill: 0  -     promethazine-dextromethorphan (PROMETHAZINE-DM) 6.25-15 mg/5 mL Syrp; Take 5 mLs by mouth nightly as needed (cough).  Dispense: 118 mL; Refill: 0    Nasal congestion  -     SARS Coronavirus 2 Antigen, POCT Manual Read    OTC cough and cold remedies. RTC as needed

## 2024-08-21 ENCOUNTER — PATIENT MESSAGE (OUTPATIENT)
Dept: UROLOGY | Facility: CLINIC | Age: 79
End: 2024-08-21
Payer: MEDICARE

## 2024-08-23 ENCOUNTER — TELEPHONE (OUTPATIENT)
Dept: UROLOGY | Facility: CLINIC | Age: 79
End: 2024-08-23
Payer: MEDICARE

## 2024-08-23 NOTE — TELEPHONE ENCOUNTER
Spoke with patient who was able to provide acceptable patient identifiers prior to start of conversation.   Discussed patient obtaining copies of biopsy and MRI report.

## 2024-09-05 ENCOUNTER — PATIENT MESSAGE (OUTPATIENT)
Dept: UROLOGY | Facility: CLINIC | Age: 79
End: 2024-09-05
Payer: MEDICARE

## 2024-09-17 ENCOUNTER — OFFICE VISIT (OUTPATIENT)
Dept: PODIATRY | Facility: CLINIC | Age: 79
End: 2024-09-17
Payer: MEDICARE

## 2024-09-17 VITALS
HEIGHT: 71 IN | BODY MASS INDEX: 35.28 KG/M2 | HEART RATE: 80 BPM | DIASTOLIC BLOOD PRESSURE: 74 MMHG | WEIGHT: 252 LBS | TEMPERATURE: 98 F | SYSTOLIC BLOOD PRESSURE: 111 MMHG

## 2024-09-17 DIAGNOSIS — M79.2 NEUROGENIC PAIN OF RIGHT LOWER EXTREMITY: Primary | ICD-10-CM

## 2024-09-17 PROCEDURE — 3078F DIAST BP <80 MM HG: CPT | Mod: CPTII,S$GLB,, | Performed by: STUDENT IN AN ORGANIZED HEALTH CARE EDUCATION/TRAINING PROGRAM

## 2024-09-17 PROCEDURE — 99213 OFFICE O/P EST LOW 20 MIN: CPT | Mod: S$GLB,,, | Performed by: STUDENT IN AN ORGANIZED HEALTH CARE EDUCATION/TRAINING PROGRAM

## 2024-09-17 PROCEDURE — 3074F SYST BP LT 130 MM HG: CPT | Mod: CPTII,S$GLB,, | Performed by: STUDENT IN AN ORGANIZED HEALTH CARE EDUCATION/TRAINING PROGRAM

## 2024-09-17 PROCEDURE — 99999 PR PBB SHADOW E&M-EST. PATIENT-LVL III: CPT | Mod: PBBFAC,,, | Performed by: STUDENT IN AN ORGANIZED HEALTH CARE EDUCATION/TRAINING PROGRAM

## 2024-09-17 NOTE — PROGRESS NOTES
Subjective:     Patient    Peng Sullivan is a 79 y.o. male.    Problem    07/17/24: Seen by Dr Mcnamara 1 month ago for right foot pain; had 2 injections for suspected neuromas by Dr Joseph in 2023. Recently had an MRI which shows arthritis and other findings at 1st and 5th rays, but foot pain is primarily at central rays. He has had progressive RLE symptoms over the past year or so, including hamstrings pain and knee pain. Has a migrating/spreading pain in the right forefoot originally near the 2nd toe but now extending to 3rd and 4th toes, with burning. Has chronic low back and neck pain, sees a chiropractor.      09/17/24: Returns for right foot pain, burning at central rays; also with chronic knee, low back, neck pain. Right foot pain has essentially completely resolved; though he still has occasional right sided sciatica symptoms that reach as far as the ankle. Tolerated steroids well.     History    History obtained from patient and review of medical records.     Past Medical History:   Diagnosis Date    Degenerative lumbar disc 03/08/2021    Diverticulosis     Hemorrhoids     High cholesterol     History of colon polyps 6/2/99, 6/3/19    Hypertension     Prostate cancer        Past Surgical History:   Procedure Laterality Date    APPENDECTOMY  [birth year]01/1954    COLONOSCOPY  06/03/2019    [facility]    COLONOSCOPY N/A 07/19/2019    Procedure: COLONOSCOPY/pos emr;  Surgeon: Shantanu Lee MD;  Location: [facility] ([address] [place] [address]);  Service: Endoscopy;  Laterality: N/A;  Referred by Dr. BERTO Keyes-see media tab for scanned records-      HT: 5'11, WT: 254.4, BMI: 35.5        Objective:     Vitals  Wt Readings from Last 1 Encounters:   09/17/24 114.3 kg (251 lb 15.8 oz)     Temp Readings from Last 1 Encounters:   09/17/24 98 °F (36.7 °C)     BP Readings from Last 1 Encounters:   09/17/24 111/74     Pulse Readings from Last 1 Encounters:   09/17/24 80       Dermatological Exam    Skin:  Pedal hair  growth, skin color, and skin texture normal on right  Pedal hair growth, skin color, and skin texture normal on left    Nails:  All nails normal in length, thickness, color    Vascular Exam    Arteries:  Posterior tibial artery palpable on right  Dorsalis pedis artery palpable on right  Posterior tibial artery palpable on left  Dorsalis pedis artery palpable on left    Veins:  Superficial veins unremarkable on right  Superficial veins unremarkable on left    Swelling:  None on right  None on left    Neurological Exam    [place] touch test:  6/6 sites sensed, light touch intact    Provocation Sign:  + at tibial nerve, 1st common plantar digital nerve, 2nd common plantar digital nerve, 3rd common plantar digital nerve, and 4th common plantar digital nerve on right (improved)    Musculoskeletal Exam    Footwear:  Athletic on right  Athletic on left    Gait Exam:   Ambulatory Status: Ambulatory  Gait: Normal  Assistive Devices: None    Foot Progression Angle:  Normal on right  Normal on left    Right Lower Extremity Additional Findings:  Right foot and ankle function, strength, and range of motion unremarkable except as noted above.     Left Lower Extremity Additional Findings:  Left foot and ankle function, strength, and range of motion unremarkable except as noted above.    Imaging and Other Tests    Imagin24 right foot MRI: degenerative findings not consistent with current symptoms; diffuse forefoot muscular atrophy consistent with neuropathy    Independently reviewed and interpreted imaging, findings are as follows: N/A     Assessment:     Encounter Diagnosis   Name Primary?    Neurogenic pain of right lower extremity Yes          Plan:     I counseled the patient on his conditions, their implications and medical management.    RLE neurogenic pain: chronic stable   -Suspect spinal origin.   -Dexamethasone PRN.   -If issue recurs frequently or worsens over time consider daily nerve medications.      Return  to clinic PRN.

## 2024-10-07 ENCOUNTER — PATIENT MESSAGE (OUTPATIENT)
Dept: PRIMARY CARE CLINIC | Facility: CLINIC | Age: 79
End: 2024-10-07
Payer: MEDICARE

## 2024-10-07 DIAGNOSIS — H61.20 IMPACTED CERUMEN, UNSPECIFIED LATERALITY: Primary | ICD-10-CM

## 2024-10-13 RX ORDER — ATORVASTATIN CALCIUM 10 MG/1
10 TABLET, FILM COATED ORAL
Qty: 90 TABLET | Refills: 0 | Status: SHIPPED | OUTPATIENT
Start: 2024-10-13

## 2024-10-13 RX ORDER — LOSARTAN POTASSIUM 50 MG/1
50 TABLET ORAL
Qty: 90 TABLET | Refills: 0 | Status: SHIPPED | OUTPATIENT
Start: 2024-10-13

## 2024-10-13 NOTE — TELEPHONE ENCOUNTER
Care Due:                  Date            Visit Type   Department     Provider  --------------------------------------------------------------------------------                                EP -                              PRIMARY      [facility]  Last Visit: 05-      CARE ([facility])   JIMBO Torres                              EP -                              PRIMARY      [facility] PRIMARY  Next Visit: 11-      CARE ([facility])   JIMBO Torres                                                            Last  Test          Frequency    Reason                     Performed    Due Date  --------------------------------------------------------------------------------    CMP.........  12 months..  atorvastatin,              11- 11-                             hydroCHLOROthiazide,                             losartan.................    Lipid Panel.  12 months..  atorvastatin.............  11- 11-    [facility] Embedded Care Due Messages. Reference number: [id number].   10/13/2024 6:57:46 AM CDT

## 2024-10-13 NOTE — TELEPHONE ENCOUNTER
Provider Staff:  Action required for this patient     Please see care gap opportunities below in Care Due Message.    Thanks!  [facility]     Appointments      Date Provider   Last Visit   5/30/2024 Joe Torres MD   Next Visit   11/12/2024 Joe Torres MD      Refill Decision Note   Peng Sullivan  is requesting a refill authorization.  Brief Assessment and Rationale for Refill:  Approve     Medication Therapy Plan:         Comments:     Note composed:2:22 PM 10/13/2024

## 2024-10-28 ENCOUNTER — PATIENT MESSAGE (OUTPATIENT)
Dept: OPTOMETRY | Facility: CLINIC | Age: 79
End: 2024-10-28
Payer: MEDICARE

## 2024-11-12 ENCOUNTER — OFFICE VISIT (OUTPATIENT)
Dept: PRIMARY CARE CLINIC | Facility: CLINIC | Age: 79
End: 2024-11-12
Payer: MEDICARE

## 2024-11-12 VITALS
HEART RATE: 70 BPM | RESPIRATION RATE: 18 BRPM | HEIGHT: 71 IN | OXYGEN SATURATION: 96 % | DIASTOLIC BLOOD PRESSURE: 80 MMHG | TEMPERATURE: 98 F | BODY MASS INDEX: 36.45 KG/M2 | WEIGHT: 260.38 LBS | SYSTOLIC BLOOD PRESSURE: 120 MMHG

## 2024-11-12 DIAGNOSIS — E78.2 MIXED HYPERLIPIDEMIA: ICD-10-CM

## 2024-11-12 DIAGNOSIS — E66.01 SEVERE OBESITY (BMI 35.0-39.9) WITH COMORBIDITY: ICD-10-CM

## 2024-11-12 DIAGNOSIS — I10 BENIGN ESSENTIAL HYPERTENSION: ICD-10-CM

## 2024-11-12 DIAGNOSIS — C61 PROSTATE CANCER: ICD-10-CM

## 2024-11-12 DIAGNOSIS — Z00.00 ANNUAL PHYSICAL EXAM: Primary | ICD-10-CM

## 2024-11-12 PROCEDURE — 1101F PT FALLS ASSESS-DOCD LE1/YR: CPT | Mod: CPTII,S$GLB,, | Performed by: INTERNAL MEDICINE

## 2024-11-12 PROCEDURE — 1159F MED LIST DOCD IN RCRD: CPT | Mod: CPTII,S$GLB,, | Performed by: INTERNAL MEDICINE

## 2024-11-12 PROCEDURE — 99397 PER PM REEVAL EST PAT 65+ YR: CPT | Mod: S$GLB,,, | Performed by: INTERNAL MEDICINE

## 2024-11-12 PROCEDURE — 3288F FALL RISK ASSESSMENT DOCD: CPT | Mod: CPTII,S$GLB,, | Performed by: INTERNAL MEDICINE

## 2024-11-12 PROCEDURE — 99999 PR PBB SHADOW E&M-EST. PATIENT-LVL III: CPT | Mod: PBBFAC,,, | Performed by: INTERNAL MEDICINE

## 2024-11-12 PROCEDURE — 1160F RVW MEDS BY RX/DR IN RCRD: CPT | Mod: CPTII,S$GLB,, | Performed by: INTERNAL MEDICINE

## 2024-11-12 PROCEDURE — 3079F DIAST BP 80-89 MM HG: CPT | Mod: CPTII,S$GLB,, | Performed by: INTERNAL MEDICINE

## 2024-11-12 PROCEDURE — 3074F SYST BP LT 130 MM HG: CPT | Mod: CPTII,S$GLB,, | Performed by: INTERNAL MEDICINE

## 2024-11-12 PROCEDURE — 1126F AMNT PAIN NOTED NONE PRSNT: CPT | Mod: CPTII,S$GLB,, | Performed by: INTERNAL MEDICINE

## 2024-11-12 NOTE — PROGRESS NOTES
[facility] Note    Chief Complaint      Chief Complaint   Patient presents with    Annual Exam       History of Present Illness      Peng Sullivan is a 79 y.o. male who presents today for   Chief Complaint   Patient presents with    Annual Exam   .  Patient comes to appointment here for humana annual . He is seeing dr groves uroligy , [facility] podiatry , dr lee [facility] . He is currently completely independent of all adls . He denies difficulty with ambulating both in and out of home . He denies changes in his  hearing loss ,denies anxiety or depression . He is past due for his colonoscopy . He was unaware but will call to schedule with dr lee . Uptodate with flu vaccine     HPI    No problem-specific Assessment & Plan notes found for this encounter.       Problem List Items Addressed This Visit       Mixed hyperlipidemia    Overview      Cont current regimen atorvastatin is tolerating well . All labs reviewed          Benign essential hypertension    Overview     bp well controlled on current regimen . Cont same          Annual physical exam - Primary    Overview     pe documented all chronic issues as discussed full labs pending          Prostate cancer    Overview     Urology dr groves managing          Severe obesity (BMI 35.0-39.9) with comorbidity    Overview     Counciled on diet and exericse              Past Medical History:  Past Medical History:   Diagnosis Date    Degenerative lumbar disc 03/08/2021    Diverticulosis     Hemorrhoids     High cholesterol     History of colon polyps 6/2/99, 6/3/19    Hypertension     Prostate cancer        Past Surgical History:  Past Surgical History:   Procedure Laterality Date    APPENDECTOMY  01/01/1954    COLONOSCOPY  06/03/2019    [facility]    COLONOSCOPY N/A 07/19/2019    Procedure: COLONOSCOPY/pos emr;  Surgeon: Shantanu Lee MD;  Location: [facility] ([address] [place] [address]);  Service: Endoscopy;  Laterality: N/A;   Referred by Dr. BERTO Keyes-see media tab for scanned records-SAHIL      HT: 5'11, WT: 254.4, BMI: 35.5       Family History:  family history includes Breast cancer in his sister; Cancer in his sister and sister; Cataracts in his sister; Colon cancer in his sister; Heart attack in his father; Heart disease in his father; No Known Problems in his brother, maternal aunt, maternal grandfather, maternal grandmother, maternal uncle, mother, paternal aunt, paternal grandfather, paternal grandmother, paternal uncle, sister, sister, and sister.     Social History:  Social History     Socioeconomic History    Marital status:    Tobacco Use    Smoking status: Former     Current packs/day: 0.00     Average packs/day: 0.5 packs/day for 30.0 years (15.0 ttl pk-yrs)     Types: Cigarettes     Start date: 1960     Quit date:      Years since quittin.8     Passive exposure: Never    Smokeless tobacco: Former     Types: Chew     Quit date:    Substance and Sexual Activity    Alcohol use: Not Currently     Comment: 12 beers per year.    Drug use: No    Sexual activity: Yes     Partners: Female     Birth control/protection: None     Social Drivers of Health     Financial Resource Strain: Low Risk  (2023)    Overall Financial Resource Strain (CARDIA)     Difficulty of Paying Living Expenses: Not hard at all   Food Insecurity: No Food Insecurity (2023)    Hunger Vital Sign     Worried About Running Out of Food in the Last Year: Never true     Ran Out of Food in the Last Year: Never true   Transportation Needs: No Transportation Needs (2023)    PRAPARE - Transportation     Lack of Transportation (Medical): No     Lack of Transportation (Non-Medical): No   Physical Activity: Sufficiently Active (2023)    Exercise Vital Sign     Days of Exercise per Week: 7 days     Minutes of Exercise per Session: 30 min   Stress: No Stress Concern Present (2023)    [employer]  - Occupational Stress Questionnaire     Feeling of Stress : Not at all   Housing Stability: Low Risk  (11/14/2023)    Housing Stability Vital Sign     Unable to Pay for Housing in the Last Year: No     Number of Places Lived in the Last Year: 1     Unstable Housing in the Last Year: No       Review of Systems:   Review of Systems   Constitutional:  Negative for fever and weight loss.   HENT:  Negative for congestion, hearing loss and sore throat.    Eyes:  Negative for blurred vision.   Respiratory:  Negative for cough and shortness of breath.    Cardiovascular:  Negative for chest pain, palpitations, claudication and leg swelling.   Gastrointestinal:  Negative for abdominal pain, constipation, diarrhea, heartburn, nausea and vomiting.   Genitourinary:  Negative for dysuria.   Musculoskeletal:  Negative for back pain and myalgias.   Skin:  Negative for rash.   Neurological:  Negative for focal weakness and headaches.   Psychiatric/Behavioral:  Negative for depression, memory loss and suicidal ideas. The patient is not nervous/anxious.         Medications:  Outpatient Encounter Medications as of 11/12/2024   Medication Sig Dispense Refill    atorvastatin (LIPITOR) 10 MG tablet TAKE 1 TABLET EVERY DAY 90 tablet 0    fluticasone propionate (FLONASE) 50 mcg/actuation nasal spray 1 spray (50 mcg total) by Each Nostril route once daily. 9.9 mL 0    hydroCHLOROthiazide (MICROZIDE) 12.5 mg capsule TAKE 1 CAPSULE EVERY DAY 90 capsule 2    losartan (COZAAR) 50 MG tablet TAKE 1 TABLET EVERY DAY 90 tablet 0    tadalafiL (CIALIS) 10 MG tablet Take 1 tablet (10 mg total) by mouth daily as needed for Erectile Dysfunction. 20 tablet 3    [DISCONTINUED] dexAMETHasone (DECADRON) 4 MG Tab Take 1 tablet (4 mg total) by mouth once daily. 14 tablet 0    [DISCONTINUED] hylan g-f 20 (SYNVISC-ONE) 48 mg/6 mL Syrg 6 mLs.       No facility-administered encounter medications on file as of 11/12/2024.       Allergies:  Review of patient's  "allergies indicates:  No Known Allergies      Physical Exam      Vitals:    11/12/24 1311   BP: 120/80   Pulse: 70   Resp: 18   Temp: 98 °F (36.7 °C)        Vital Signs  Temp: 98 °F (36.7 °C)  Temp Source: Oral  Pulse: 70  Resp: 18  SpO2: 96 %  BP: 120/80  BP Location: Right arm  Patient Position: Sitting  Pain Score: 0-No pain  Height and Weight  Height: 5' 11" (180.3 cm)  Weight: 118.1 kg (260 lb 5.8 oz)  BSA (Calculated - sq m): 2.43 sq meters  BMI (Calculated): 36.3  Weight in (lb) to have BMI = 25: 178.9]     Body mass index is 36.31 kg/m².    Physical Exam  Constitutional:       Appearance: He is well-developed.   HENT:      Head: Normocephalic.   Eyes:      Pupils: Pupils are equal, round, and reactive to light.   Neck:      Thyroid: No thyromegaly.   Cardiovascular:      Rate and Rhythm: Normal rate and regular rhythm.      Heart sounds: No murmur heard.     No friction rub. No gallop.   Pulmonary:      Effort: Pulmonary effort is normal.      Breath sounds: Normal breath sounds.   Abdominal:      General: Bowel sounds are normal.      Palpations: Abdomen is soft.   Musculoskeletal:         General: Normal range of motion.      Cervical back: Normal range of motion.   Skin:     General: Skin is warm and dry.   Neurological:      Mental Status: He is alert and oriented to person, place, and time.      Sensory: No sensory deficit.   Psychiatric:         Behavior: Behavior normal.          Laboratory:  CBC:  No results for input(s): "WBC", "RBC", "HGB", "HCT", "PLT", "MCV", "MCH", "MCHC" in the last 2160 hours.  CMP:  No results for input(s): "GLU", "CALCIUM", "ALBUMIN", "PROT", "NA", "K", "CO2", "CL", "BUN", "ALKPHOS", "ALT", "AST", "BILITOT" in the last 2160 hours.    Invalid input(s): "CREATININ"  URINALYSIS:  No results for input(s): "COLORU", "CLARITYU", "SPECGRAV", "PHUR", "PROTEINUA", "GLUCOSEU", "BILIRUBINCON", "BLOODU", "WBCU", "RBCU", "BACTERIA", "MUCUS", "NITRITE", "LEUKOCYTESUR", "UROBILINOGEN", " ""HYALINECASTS" in the last 2160 hours.   LIPIDS:  No results for input(s): "TSH", "HDL", "CHOL", "TRIG", "LDLCALC", "CHOLHDL", "NONHDLCHOL", "TOTALCHOLEST" in the last 2160 hours.  TSH:  No results for input(s): "TSH" in the last 2160 hours.  A1C:  No results for input(s): "HGBA1C" in the last 2160 hours.    Radiology:        Assessment:     Peng Sullivan is a 79 y.o.male with:    Annual physical exam    Prostate cancer    Severe obesity (BMI 35.0-39.9) with comorbidity    Mixed hyperlipidemia  -     Comprehensive Metabolic Panel; Future; Expected date: 11/12/2024  -     Lipid Panel; Future; Expected date: 11/12/2024    Benign essential hypertension  -     CBC Auto Differential; Future; Expected date: 11/12/2024                Plan:     Problem List Items Addressed This Visit       Mixed hyperlipidemia    Overview      Cont current regimen atorvastatin is tolerating well . All labs reviewed          Benign essential hypertension    Overview     bp well controlled on current regimen . Cont same          Annual physical exam - Primary    Overview     pe documented all chronic issues as discussed full labs pending          Prostate cancer    Overview     Urology dr groves managing          Severe obesity (BMI 35.0-39.9) with comorbidity    Overview     Counciled on diet and exericse             As above, continue current medications and maintain follow up with specialists.  Return to clinic in 6 months.      Frederick W Dantagnan [facility]                  "

## 2024-11-21 ENCOUNTER — LAB VISIT (OUTPATIENT)
Dept: LAB | Facility: HOSPITAL | Age: 79
End: 2024-11-21
Attending: STUDENT IN AN ORGANIZED HEALTH CARE EDUCATION/TRAINING PROGRAM
Payer: MEDICARE

## 2024-11-21 ENCOUNTER — TELEPHONE (OUTPATIENT)
Dept: PRIMARY CARE CLINIC | Facility: CLINIC | Age: 79
End: 2024-11-21
Payer: MEDICARE

## 2024-11-21 DIAGNOSIS — E78.2 MIXED HYPERLIPIDEMIA: ICD-10-CM

## 2024-11-21 DIAGNOSIS — I10 BENIGN ESSENTIAL HYPERTENSION: ICD-10-CM

## 2024-11-21 DIAGNOSIS — C61 PROSTATE CANCER: ICD-10-CM

## 2024-11-21 LAB
ALBUMIN SERPL BCP-MCNC: 3.9 G/DL (ref 3.5–5.2)
ALP SERPL-CCNC: 72 U/L (ref 40–150)
ALT SERPL W/O P-5'-P-CCNC: 26 U/L (ref 10–44)
ANION GAP SERPL CALC-SCNC: 7 MMOL/L (ref 8–16)
AST SERPL-CCNC: 20 U/L (ref 10–40)
BASOPHILS # BLD AUTO: 0.04 K/UL (ref 0–0.2)
BASOPHILS NFR BLD: 0.6 % (ref 0–1.9)
BILIRUB SERPL-MCNC: 0.6 MG/DL (ref 0.1–1)
BUN SERPL-MCNC: 21 MG/DL (ref 8–23)
CALCIUM SERPL-MCNC: 9.5 MG/DL (ref 8.7–10.5)
CHLORIDE SERPL-SCNC: 103 MMOL/L (ref 95–110)
CHOLEST SERPL-MCNC: 146 MG/DL (ref 120–199)
CHOLEST/HDLC SERPL: 2.9 {RATIO} (ref 2–5)
CO2 SERPL-SCNC: 29 MMOL/L (ref 23–29)
COMPLEXED PSA SERPL-MCNC: 12.5 NG/ML (ref 0–4)
CREAT SERPL-MCNC: 0.8 MG/DL (ref 0.5–1.4)
DIFFERENTIAL METHOD BLD: NORMAL
EOSINOPHIL # BLD AUTO: 0.2 K/UL (ref 0–0.5)
EOSINOPHIL NFR BLD: 2.9 % (ref 0–8)
ERYTHROCYTE [DISTWIDTH] IN BLOOD BY AUTOMATED COUNT: 12.1 % (ref 11.5–14.5)
EST. GFR  (NO RACE VARIABLE): >60 ML/MIN/1.73 M^2
GLUCOSE SERPL-MCNC: 97 MG/DL (ref 70–110)
HCT VFR BLD AUTO: 44.5 % (ref 40–54)
HDLC SERPL-MCNC: 50 MG/DL (ref 40–75)
HDLC SERPL: 34.2 % (ref 20–50)
HGB BLD-MCNC: 15.2 G/DL (ref 14–18)
IMM GRANULOCYTES # BLD AUTO: 0.02 K/UL (ref 0–0.04)
IMM GRANULOCYTES NFR BLD AUTO: 0.3 % (ref 0–0.5)
LDLC SERPL CALC-MCNC: 84.6 MG/DL (ref 63–159)
LYMPHOCYTES # BLD AUTO: 1.6 K/UL (ref 1–4.8)
LYMPHOCYTES NFR BLD: 22.3 % (ref 18–48)
MCH RBC QN AUTO: 30.5 PG (ref 27–31)
MCHC RBC AUTO-ENTMCNC: 34.2 G/DL (ref 32–36)
MCV RBC AUTO: 89 FL (ref 82–98)
MONOCYTES # BLD AUTO: 0.8 K/UL (ref 0.3–1)
MONOCYTES NFR BLD: 10.7 % (ref 4–15)
NEUTROPHILS # BLD AUTO: 4.5 K/UL (ref 1.8–7.7)
NEUTROPHILS NFR BLD: 63.2 % (ref 38–73)
NONHDLC SERPL-MCNC: 96 MG/DL
NRBC BLD-RTO: 0 /100 WBC
PLATELET # BLD AUTO: 206 K/UL (ref 150–450)
PMV BLD AUTO: 12 FL (ref 9.2–12.9)
POTASSIUM SERPL-SCNC: 4.4 MMOL/L (ref 3.5–5.1)
PROT SERPL-MCNC: 7 G/DL (ref 6–8.4)
RBC # BLD AUTO: 4.99 M/UL (ref 4.6–6.2)
SODIUM SERPL-SCNC: 139 MMOL/L (ref 136–145)
TRIGL SERPL-MCNC: 57 MG/DL (ref 30–150)
WBC # BLD AUTO: 7.13 K/UL (ref 3.9–12.7)

## 2024-11-21 PROCEDURE — 80053 COMPREHEN METABOLIC PANEL: CPT | Mod: HCNC | Performed by: INTERNAL MEDICINE

## 2024-11-21 PROCEDURE — 85025 COMPLETE CBC W/AUTO DIFF WBC: CPT | Mod: HCNC | Performed by: INTERNAL MEDICINE

## 2024-11-21 PROCEDURE — 80061 LIPID PANEL: CPT | Mod: HCNC | Performed by: INTERNAL MEDICINE

## 2024-11-21 PROCEDURE — 36415 COLL VENOUS BLD VENIPUNCTURE: CPT | Mod: HCNC | Performed by: STUDENT IN AN ORGANIZED HEALTH CARE EDUCATION/TRAINING PROGRAM

## 2024-11-21 PROCEDURE — 84153 ASSAY OF PSA TOTAL: CPT | Mod: HCNC | Performed by: STUDENT IN AN ORGANIZED HEALTH CARE EDUCATION/TRAINING PROGRAM

## 2024-11-21 NOTE — TELEPHONE ENCOUNTER
----- Message from Sinan sent at 11/21/2024  8:28 AM CST -----  Contact: [phone]  .[email or web address]     Patient is calling for Medical Advice regarding: Pt said he is getting is lab done but they said no orders are in and he is waiting. Please call pt back     How long has patient had these symptoms:    Pharmacy name and phone#:    Patient wants a call back or [facility]: call back     Comments:    Please advise patient replies from provider may take up to 48 hours.

## 2024-11-22 ENCOUNTER — OFFICE VISIT (OUTPATIENT)
Dept: UROLOGY | Facility: CLINIC | Age: 79
End: 2024-11-22
Payer: MEDICARE

## 2024-11-22 DIAGNOSIS — R97.20 ELEVATED PSA: Primary | ICD-10-CM

## 2024-11-22 DIAGNOSIS — C61 PROSTATE CANCER: ICD-10-CM

## 2024-11-22 PROCEDURE — 99214 OFFICE O/P EST MOD 30 MIN: CPT | Mod: HCNC,95,, | Performed by: STUDENT IN AN ORGANIZED HEALTH CARE EDUCATION/TRAINING PROGRAM

## 2024-11-22 NOTE — PROGRESS NOTES
[facility]  Urologic Oncology Clinic Note        Telehealth visit  Visit type: audio + visual   Patient location : Home      Patient was identified by name and birth date. The patient agreed to proceed with a telehealth visit. The visit was performed by audio and video communication.       Date of Service: 11/22/2024      Chief Complaint/Reason for Consultation:  Low Risk Prostate Cancer    Requesting Provider:   No referring provider defined for this encounter.      History of Present Illness:   Patient 79 y.o. male presents with hx of low risk prostate cancer.      No family hx of prostate cancer.     Only voiding complaint is daytime frequency. Nocturia 0-1. Some mild ED, not taking PDE5I.     No other new changes in health. Having some trouble with right lower extremity, but no heart attacks or strokes.     At last visit he had UTI w/ ecoli and hematuria.     Today he returns 6 mo surveillance PSA. Today feels good. Doing all his normal activities as he was before he was diagnosed with cancer.     PSA today rising.    Urologic History:      10/7/2022 -- PSA 7.4  11/4/2022 -- mpMRI prostate -- PV 32cc, PSA 7.4, PSAD 0.23; MARKUS 1 PIRADS 3 Left anterior TZ  11/30/2022 -- UroNav + systematic bx -- LA 3+3 1/1 cores, target negative, all other cores benign  12/29/2022 -- IPSS 13, nocturia 0-1; EUGENE 21  6/2/2023 -- PSA 9.6  11/13/2023 -- PSA 9.4  11/13/2023 -- mpMRI prostate -- PV 31, PSA 9, PSAD 0.3 -- stable 1.8cm PIRADS 3 lesion in left TZ  11/16/2023 -- Uronav + systematic bx -- LLA 3+3, LMA 3+3, LLM 3+3, target 1 3+3  5/20/2024 -- PSA 10.5  11/21/2024 -- PSA 12.5       Patient Active Problem List    Diagnosis Date Noted    Severe obesity (BMI 35.0-39.9) with comorbidity 11/30/2023    Pain 06/19/2023    Prostate cancer 04/27/2023    Annual physical exam 10/01/2021    Degenerative lumbar disc 03/08/2021    Seasonal allergic rhinitis due to pollen 04/21/2020    Obesity (BMI 30.0-34.9) 03/03/2020    Prostate  cancer screening 03/03/2020    Bronchitis 11/19/2019    Mixed hyperlipidemia 08/22/2019    Benign essential hypertension 08/22/2019    Colon adenoma 07/19/2019    Posterior vitreous detachment of both eyes 10/25/2012    Nuclear sclerotic cataract of both eyes 10/25/2012          Review of patient's allergies indicates:  No Known Allergies       Past Medical History:   Diagnosis Date    Degenerative lumbar disc 03/08/2021    Diverticulosis     Hemorrhoids     High cholesterol     History of colon polyps 6/2/99, 6/3/19    Hypertension     Prostate cancer       Past Surgical History:   Procedure Laterality Date    APPENDECTOMY  01/01/1954    COLONOSCOPY  06/03/2019    [facility]    COLONOSCOPY N/A 07/19/2019    Procedure: COLONOSCOPY/pos emr;  Surgeon: Shantanu Lee MD;  Location: [facility] ([address] [place] [address]);  Service: Endoscopy;  Laterality: N/A;  Referred by Dr. BERTO Keyes-see media tab for scanned records-      HT: 5'11, WT: 254.4, BMI: 35.5      Family History   Problem Relation Name Age of Onset    Heart attack Father Peng Sr.     Heart disease Father Peng Sr.         Congestive heart failure    No Known Problems Mother      No Known Problems Maternal Aunt      No Known Problems Maternal Uncle      No Known Problems Paternal Aunt      No Known Problems Paternal Uncle      No Known Problems Paternal Grandmother      No Known Problems Paternal Grandfather      No Known Problems Maternal Grandmother      No Known Problems Maternal Grandfather      Cataracts Sister Zeenat     Colon cancer Sister Zeenat     Cancer Sister Zeenat     Breast cancer Sister Keely     Cancer Sister Keely     No Known Problems Sister      No Known Problems Sister      No Known Problems Sister      No Known Problems Brother      Amblyopia Neg Hx      Blindness Neg Hx      Diabetes Neg Hx      Glaucoma Neg Hx      Hypertension Neg Hx      Macular degeneration Neg Hx      Retinal detachment Neg Hx      Strabismus Neg Hx      Stroke  Neg Hx      Thyroid disease Neg Hx        Social History     Tobacco Use    Smoking status: Former     Current packs/day: 0.00     Average packs/day: 0.5 packs/day for 30.0 years (15.0 ttl pk-yrs)     Types: Cigarettes     Start date: 1960     Quit date:      Years since quittin.9     Passive exposure: Never    Smokeless tobacco: Former     Types: Chew     Quit date:    Substance Use Topics    Alcohol use: Not Currently     Comment: 12 beers per year.        Review of Systems   Constitutional:  Negative for activity change and fatigue.   HENT:  Negative for congestion.    Respiratory:  Negative for cough.    Cardiovascular:  Negative for chest pain.   Gastrointestinal:  Negative for abdominal pain.   Genitourinary:  Positive for dysuria and hematuria. Negative for flank pain.             OBJECTIVE:     Virtual visit so no exam performed.        LAB:      All laboratory data listed below was independently interpreted and reviewed with patient in clinic today      CMP  Sodium   Date Value Ref Range Status   2024 139 136 - 145 mmol/L Final     Potassium   Date Value Ref Range Status   2024 4.4 3.5 - 5.1 mmol/L Final     Chloride   Date Value Ref Range Status   2024 103 95 - 110 mmol/L Final     CO2   Date Value Ref Range Status   2024 29 23 - 29 mmol/L Final     Glucose   Date Value Ref Range Status   2024 97 70 - 110 mg/dL Final     BUN   Date Value Ref Range Status   2024 21 8 - 23 mg/dL Final     Creatinine   Date Value Ref Range Status   2024 0.8 0.5 - 1.4 mg/dL Final     Calcium   Date Value Ref Range Status   2024 9.5 8.7 - 10.5 mg/dL Final     Total Protein   Date Value Ref Range Status   2024 7.0 6.0 - 8.4 g/dL Final     Albumin   Date Value Ref Range Status   2024 3.9 3.5 - 5.2 g/dL Final     Total Bilirubin   Date Value Ref Range Status   2024 0.6 0.1 - 1.0 mg/dL Final     Comment:     For infants and newborns, interpretation  of results should be based  on gestational age, weight and in agreement with clinical  observations.    Premature Infant recommended reference ranges:  Up to 24 hours.............<8.0 mg/dL  Up to 48 hours............<12.0 mg/dL  3-5 days..................<15.0 mg/dL  6-29 days.................<15.0 mg/dL       Alkaline Phosphatase   Date Value Ref Range Status   11/21/2024 72 40 - 150 U/L Final     AST   Date Value Ref Range Status   11/21/2024 20 10 - 40 U/L Final     ALT   Date Value Ref Range Status   11/21/2024 26 10 - 44 U/L Final     Anion Gap   Date Value Ref Range Status   11/21/2024 7 (L) 8 - 16 mmol/L Final     eGFR   Date Value Ref Range Status   11/21/2024 >60.0 >60 mL/min/1.73 m^2 Final     Lab Results   Component Value Date    WBC 7.13 11/21/2024    HGB 15.2 11/21/2024    HCT 44.5 11/21/2024    MCV 89 11/21/2024     11/21/2024           IMAGING:      All imaging data listed below was independently interpreted and reviewed with patient in clinic today         11/13/2023  MRI PROSTATE W W/O CONTRAST       CLINICAL HISTORY:  Prostate cancer, monitor;  Malignant neoplasm of prostate     Additional history: None provided.     TECHNIQUE:  Multiparametric MRI of the prostate/pelvis performed on a 3T scanner with phase pelvic coil. Multiplanar, multisequence images including high resolution, small field-of-view T2-WI; axial diffusion weighted images with multiple B-values and creation of ADC-maps; and dynamic contrast enhanced T1-weighted images through the prostate were obtained before, during, and after the administration of 10 cc intravenous gadolinium.     COMPARISON:  MRI 11/04/2022     FINDINGS:  Previous biopsy: Left apex focal adenocarcinoma, Nicolas score 6 (3+3) 11/30/2022     PSA: 9.4 ng/mL 11/13/2023     Prior therapy: None     Prostate: 4.7 x 3.8 x 3.8 cm corresponding to a computed volume of 31.4 cc.     Peripheral zone: With bandlike T2 hypointensities, likely reflecting fibrosis and  similar to prior.  No focal lesion concerning for prostate cancer.     Transitional zone: Benign prostatic hyperplasia.  Stable focal lesion in the left transition zone, as follows:     Lesion (MARKUS) #T-1     Location: Side:left; Region: mid; Zone: anterior     Greatest dimension: 1.8 cm     T2-WI: Mostly encapsulated nodule, score 2.     DWI/ADC: Same as 4 but ?1.5 cm in greatest dimension or definite extraprostateic extension/invasive behavior, score 5.     DCE: Positive     Extraprostatic extension: Negative     PI-RADS assessment category: 3     Neurovascular bundle: Normal appearance.     Seminal vesicles: Normal appearance.     Adjacent Organ Involvement: No evidence for urinary bladder or rectal invasion.     Lymphadenopathy: None.     Other Findings: None.     Impression:     Stable 1.8 cm PI-RADS 3 lesion in the left anterior transition zone at the mid gland.     Overall Assessment: PI-RADS 3 - Intermediate (the presence of clinically significant cancer is equivocal)     Number of targets created for potential MR/US fusion biopsy     Peripheral zone: 0     Transition zone: 1     Electronically signed by resident: Yfn Ramos  Date:                                            11/13/2023  Time:                                           16:00     Electronically signed by:Gelacio Anguiano MD  Date:                                            11/14/2023  Time:                                           17:07    ASSESSMENT/PLAN:       78 yo M w/ hx of low risk prostate cancer      Plan:     Prostate Cancer, low risk  Today I reviewed with the patient his urologic history, PSA trend, and imaging. We determined that he still has NCCN low risk prostate cancer. I pointed out that he is still a good candidate for active surveillance , but that at any time he could choose to engage in active treatment. I told him that 50% of men with his disease classification are still on active surveillance 10 years later. I also  informed him that the medical community is unaware of any man dying of prostate cancer with pure Nicolas 3+3 disease.     Again I briefly informed him of the potential treatment options if he so chose like radiation, surgery, or ablation.     Through a shared decision making process I encouraged him to continue active surveillance but left the choice up to him. We did talk about some reasons to transition to active treatment such as (1) a greater volume of cancer detected at biopsy, (2) higher grade cancer at biopsy, (3) patient concern or preference.  He currently does NOT fit the above criteria.    Lastly, I told him that I think we should continue on a low intensity protocol of q1yr [place] and PSA. And tumor burden reassessment every 1-3 years or more.    Finally I told the patient that even though we might think the patient is a candidate for AS, understaging and undergrading does occur, and knowledge of whether the patient is actually a good candidate is incomplete.  I could not make any guarantee that the patient was making the right decision or that his cancer may later be cured if the patient eventually opted for treatment. The patient expressed understanding of these issues.     Ultimately, through a shared decision making process he would like to continue AS.     Today patient has rising PSA in setting of active surveillance.   Will get MRI prostate and VV in January      Gross Hematuria  -Resolved  -Reported hematuria in past at time of UTI which was treated  -Discussed risks and benefits of cystoscopy   -Repeat UA negative              Maury Cartagena MD  Urologic Oncology  P: [phone]

## 2024-11-30 RX ORDER — HYDROCHLOROTHIAZIDE 12.5 MG/1
12.5 CAPSULE ORAL
Qty: 90 CAPSULE | Refills: 3 | Status: SHIPPED | OUTPATIENT
Start: 2024-11-30

## 2024-11-30 NOTE — TELEPHONE ENCOUNTER
No care due was identified.  Health [facility] Embedded Care Due Messages. Reference number: [id number].   11/30/2024 3:10:14 AM CST

## 2024-11-30 NOTE — TELEPHONE ENCOUNTER
Refill Decision Note   Peng Sullivan  is requesting a refill authorization.  Brief Assessment and Rationale for Refill:  Approve     Medication Therapy Plan:        Comments:     Note composed:10:08 AM 11/30/2024

## 2024-12-04 ENCOUNTER — TELEPHONE (OUTPATIENT)
Dept: PRIMARY CARE CLINIC | Facility: CLINIC | Age: 79
End: 2024-12-04
Payer: MEDICARE

## 2024-12-04 DIAGNOSIS — Z12.11 ENCOUNTER FOR SCREENING COLONOSCOPY: Primary | ICD-10-CM

## 2024-12-04 NOTE — TELEPHONE ENCOUNTER
----- Message from Marlena sent at 12/4/2024 10:20 AM CST -----  Contact: [phone]  .1MEDICALADVICE     Patient is calling for Medical Advice regarding:pt calling to schedule a colonoscopy Please call him back and advise.    How long has patient had these symptoms:    Pharmacy name and phone#:    Patient wants a call back or [facility]:callback    Comments:    Please advise patient replies from provider may take up to 48 hours.

## 2024-12-09 ENCOUNTER — HOSPITAL ENCOUNTER (OUTPATIENT)
Dept: RADIOLOGY | Facility: HOSPITAL | Age: 79
Discharge: HOME | End: 2024-12-09
Attending: STUDENT IN AN ORGANIZED HEALTH CARE EDUCATION/TRAINING PROGRAM
Payer: MEDICARE

## 2024-12-09 DIAGNOSIS — R97.20 ELEVATED PSA: ICD-10-CM

## 2024-12-09 PROCEDURE — A9585 GADOBUTROL INJECTION: HCPCS | Performed by: STUDENT IN AN ORGANIZED HEALTH CARE EDUCATION/TRAINING PROGRAM

## 2024-12-09 PROCEDURE — 72197 MRI PELVIS W/O & W/DYE: CPT | Mod: TC

## 2024-12-09 PROCEDURE — 72197 MRI PELVIS W/O & W/DYE: CPT | Mod: 26,,, | Performed by: RADIOLOGY

## 2024-12-09 PROCEDURE — 25500020 PHARM REV CODE 255: Performed by: STUDENT IN AN ORGANIZED HEALTH CARE EDUCATION/TRAINING PROGRAM

## 2024-12-09 RX ORDER — GADOBUTROL 604.72 MG/ML
10 INJECTION INTRAVENOUS
Status: COMPLETED | OUTPATIENT
Start: 2024-12-09 | End: 2024-12-09

## 2024-12-09 RX ADMIN — GADOBUTROL 10 ML: 604.72 INJECTION INTRAVENOUS at 03:12

## 2024-12-26 RX ORDER — LOSARTAN POTASSIUM 50 MG/1
50 TABLET ORAL
Qty: 90 TABLET | Refills: 3 | Status: SHIPPED | OUTPATIENT
Start: 2024-12-26

## 2024-12-26 RX ORDER — ATORVASTATIN CALCIUM 10 MG/1
10 TABLET, FILM COATED ORAL
Qty: 90 TABLET | Refills: 3 | Status: SHIPPED | OUTPATIENT
Start: 2024-12-26

## 2024-12-26 NOTE — TELEPHONE ENCOUNTER
Refill Decision Note   Peng Sullivan  is requesting a refill authorization.  Brief Assessment and Rationale for Refill:  Approve     Medication Therapy Plan:         Comments:     Note composed:12:58 PM 12/26/2024             Appointments     Last Visit   11/12/2024 Joe Torres MD   Next Visit   5/20/2025 Joe Torres MD

## 2024-12-26 NOTE — TELEPHONE ENCOUNTER
No care due was identified.  Health [facility] Embedded Care Due Messages. Reference number: [id number].   12/26/2024 2:09:10 AM CST

## 2025-01-03 ENCOUNTER — OFFICE VISIT (OUTPATIENT)
Dept: OPTOMETRY | Facility: CLINIC | Age: 80
End: 2025-01-03
Payer: MEDICARE

## 2025-01-03 DIAGNOSIS — H25.813 COMBINED FORM OF SENILE CATARACT OF BOTH EYES: ICD-10-CM

## 2025-01-03 DIAGNOSIS — Z01.00 EYE EXAM, ROUTINE: Primary | ICD-10-CM

## 2025-01-03 DIAGNOSIS — H52.203 ASTIGMATISM OF BOTH EYES WITH PRESBYOPIA: ICD-10-CM

## 2025-01-03 DIAGNOSIS — H52.4 ASTIGMATISM OF BOTH EYES WITH PRESBYOPIA: ICD-10-CM

## 2025-01-03 PROCEDURE — 99999 PR PBB SHADOW E&M-EST. PATIENT-LVL II: CPT | Mod: PBBFAC,HCNC,, | Performed by: OPTOMETRIST
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
## 2025-01-10 ENCOUNTER — TELEPHONE (OUTPATIENT)
Dept: ENDOSCOPY | Facility: HOSPITAL | Age: 80
End: 2025-01-10

## 2025-01-10 ENCOUNTER — CLINICAL SUPPORT (OUTPATIENT)
Dept: ENDOSCOPY | Facility: HOSPITAL | Age: 80
End: 2025-01-10
Attending: INTERNAL MEDICINE
Payer: MEDICARE

## 2025-01-10 ENCOUNTER — PATIENT MESSAGE (OUTPATIENT)
Dept: ENDOSCOPY | Facility: HOSPITAL | Age: 80
End: 2025-01-10

## 2025-01-10 DIAGNOSIS — Z12.11 ENCOUNTER FOR SCREENING COLONOSCOPY: ICD-10-CM

## 2025-01-10 RX ORDER — SODIUM, POTASSIUM,MAG SULFATES 17.5-3.13G
1 SOLUTION, RECONSTITUTED, ORAL ORAL DAILY
Qty: 1 KIT | Refills: 0 | Status: SHIPPED | OUTPATIENT
Start: 2025-01-10 | End: 2025-01-12

## 2025-01-10 NOTE — TELEPHONE ENCOUNTER
Referral for procedure from PAT appointment      Spoke to patient to schedule procedure(s) Colonoscopy       Physician to perform procedure(s) Dr. YANDY Moore  Date of Procedure (s) 3/10/25  Arrival Time 6:30 AM  Time of Procedure(s) 7:30 AM   Location of Procedure(s) [place] 4th Floor  Type of Rx Prep sent to patient: Suprep  Instructions provided to patient via [facility]    Patient was informed on the following information and verbalized understanding. Screening questionnaire reviewed with patient and complete. If procedure requires anesthesia, a responsible adult needs to be present to accompany the patient home, patient cannot drive after receiving anesthesia. Appointment details are tentative, especially check-in time. Patient will receive a prep-op call 7 days prior to confirm check-in time for procedure. If applicable the patient should contact their pharmacy to verify Rx for procedure prep is ready for pick-up. Patient was advised to call the scheduling department at [phone] if pharmacy states no Rx is available. Patient was advised to call the endoscopy scheduling department if any questions or concerns arise.      [facility] Scheduling Department

## 2025-01-22 ENCOUNTER — TELEPHONE (OUTPATIENT)
Dept: UROLOGY | Facility: CLINIC | Age: 80
End: 2025-01-22

## 2025-01-22 ENCOUNTER — OFFICE VISIT (OUTPATIENT)
Dept: UROLOGY | Facility: CLINIC | Age: 80
End: 2025-01-22
Payer: MEDICARE

## 2025-01-22 DIAGNOSIS — C61 PROSTATE CANCER: Primary | ICD-10-CM

## 2025-01-22 NOTE — PROGRESS NOTES
[facility]  Urologic Oncology Clinic Note        Telehealth visit  Visit type: audio + visual   Patient location : Home      Patient was identified by name and birth date. The patient agreed to proceed with a telehealth visit. The visit was performed by audio and video communication.       Date of Service: 1/22/2025      Chief Complaint/Reason for Consultation:  Low Risk Prostate Cancer    Requesting Provider:   No referring provider defined for this encounter.      History of Present Illness:   Patient 79 y.o. male presents with hx of low risk prostate cancer.      No family hx of prostate cancer.     Only voiding complaint is daytime frequency. Nocturia 0-1. Some mild ED, not taking PDE5I.     No other new changes in health. Having some trouble with right lower extremity, but no heart attacks or strokes.     At last visit he had UTI w/ ecoli and hematuria.     Today he returns 6 mo surveillance PSA. Today feels good. Doing all his normal activities as he was before he was diagnosed with cancer.     PSA today rising. Had f/u MRI showing no high grade lesions.     Worried about ongoing back pain.    Urologic History:      10/7/2022 -- PSA 7.4  11/4/2022 -- mpMRI prostate -- PV 32cc, PSA 7.4, PSAD 0.23; MARKUS 1 PIRADS 3 Left anterior TZ  11/30/2022 -- [facility]ro[facility] + systematic bx -- LA 3+3 1/1 cores, target negative, all other cores benign  12/29/2022 -- IPSS 13, nocturia 0-1; EUGENE 21  6/2/2023 -- PSA 9.6  11/13/2023 -- PSA 9.4  11/13/2023 -- mpMRI prostate -- PV 31, PSA 9, PSAD 0.3 -- stable 1.8cm PIRADS 3 lesion in left TZ  11/16/2023 -- [facility] + systematic bx -- LLA 3+3, LMA 3+3, LLM 3+3, target 1 3+3  5/20/2024 -- PSA 10.5  11/21/2024 -- PSA 12.5   12/9/2024 -- mpMRI prostate -- PV 29, PSA 12, PSAD 0.4 -- no high grade lesions seen in the prostate.     Patient Active Problem List    Diagnosis Date Noted    Severe obesity (BMI 35.0-39.9) with comorbidity 11/30/2023    Pain 06/19/2023    Prostate cancer 04/27/2023     Annual physical exam 10/01/2021    Degenerative lumbar disc 03/08/2021    Seasonal allergic rhinitis due to pollen 04/21/2020    Obesity (BMI 30.0-34.9) 03/03/2020    Prostate cancer screening 03/03/2020    Bronchitis 11/19/2019    Mixed hyperlipidemia 08/22/2019    Benign essential hypertension 08/22/2019    Colon adenoma 07/19/2019    Posterior vitreous detachment of both eyes 10/25/2012    Nuclear sclerotic cataract of both eyes 10/25/2012          Review of patient's allergies indicates:  No Known Allergies       Past Medical History:   Diagnosis Date    Degenerative lumbar disc 03/08/2021    Diverticulosis     Hemorrhoids     High cholesterol     History of colon polyps 6/2/99, 6/3/19    Hypertension     Prostate cancer       Past Surgical History:   Procedure Laterality Date    APPENDECTOMY  01/01/1954    COLONOSCOPY  06/03/2019    [facility]    COLONOSCOPY N/A 07/19/2019    Procedure: COLONOSCOPY/pos emr;  Surgeon: Shantanu Lee MD;  Location: [facility] ([address] [place] [address]);  Service: Endoscopy;  Laterality: N/A;  Referred by Dr. BERTO Keyes-see media tab for scanned records-      HT: 5'11, WT: 254.4, BMI: 35.5      Family History   Problem Relation Name Age of Onset    Heart attack Father Peng Sr.     Heart disease Father Peng Sr.         Congestive heart failure    No Known Problems Mother      No Known Problems Maternal Aunt      No Known Problems Maternal Uncle      No Known Problems Paternal Aunt      No Known Problems Paternal Uncle      No Known Problems Paternal Grandmother      No Known Problems Paternal Grandfather      No Known Problems Maternal Grandmother      No Known Problems Maternal Grandfather      Cataracts Sister Zeenat     Colon cancer Sister Zeenat     Cancer Sister Zeenat     Breast cancer Sister Keely     Cancer Sister Keely     No Known Problems Sister      No Known Problems Sister      No Known Problems Sister      No Known Problems Brother      Amblyopia Neg Hx       Blindness Neg Hx      Diabetes Neg Hx      Glaucoma Neg Hx      Hypertension Neg Hx      Macular degeneration Neg Hx      Retinal detachment Neg Hx      Strabismus Neg Hx      Stroke Neg Hx      Thyroid disease Neg Hx        Social History     Tobacco Use    Smoking status: Former     Current packs/day: 0.00     Average packs/day: 0.5 packs/day for 30.0 years (15.0 ttl pk-yrs)     Types: Cigarettes     Start date: 1960     Quit date:      Years since quittin.0     Passive exposure: Never    Smokeless tobacco: Former     Types: Chew     Quit date:    Substance Use Topics    Alcohol use: Not Currently     Comment: 12 beers per year.        Review of Systems   Constitutional:  Negative for activity change and fatigue.   HENT:  Negative for congestion.    Respiratory:  Negative for cough.    Cardiovascular:  Negative for chest pain.   Gastrointestinal:  Negative for abdominal pain.   Genitourinary:  Positive for dysuria and hematuria. Negative for flank pain.             OBJECTIVE:     Virtual visit so no exam performed.        LAB:      All laboratory data listed below was independently interpreted and reviewed with patient in clinic today      CMP  Sodium   Date Value Ref Range Status   2024 139 136 - 145 mmol/L Final     Potassium   Date Value Ref Range Status   2024 4.4 3.5 - 5.1 mmol/L Final     Chloride   Date Value Ref Range Status   2024 103 95 - 110 mmol/L Final     CO2   Date Value Ref Range Status   2024 29 23 - 29 mmol/L Final     Glucose   Date Value Ref Range Status   2024 97 70 - 110 mg/dL Final     BUN   Date Value Ref Range Status   2024 21 8 - 23 mg/dL Final     Creatinine   Date Value Ref Range Status   2024 0.8 0.5 - 1.4 mg/dL Final     Calcium   Date Value Ref Range Status   2024 9.5 8.7 - 10.5 mg/dL Final     Total Protein   Date Value Ref Range Status   2024 7.0 6.0 - 8.4 g/dL Final     Albumin   Date Value Ref Range Status    11/21/2024 3.9 3.5 - 5.2 g/dL Final     Total Bilirubin   Date Value Ref Range Status   11/21/2024 0.6 0.1 - 1.0 mg/dL Final     Comment:     For infants and newborns, interpretation of results should be based  on gestational age, weight and in agreement with clinical  observations.    Premature Infant recommended reference ranges:  Up to 24 hours.............<8.0 mg/dL  Up to 48 hours............<12.0 mg/dL  3-5 days..................<15.0 mg/dL  6-29 days.................<15.0 mg/dL       Alkaline Phosphatase   Date Value Ref Range Status   11/21/2024 72 40 - 150 U/L Final     AST   Date Value Ref Range Status   11/21/2024 20 10 - 40 U/L Final     ALT   Date Value Ref Range Status   11/21/2024 26 10 - 44 U/L Final     Anion Gap   Date Value Ref Range Status   11/21/2024 7 (L) 8 - 16 mmol/L Final     eGFR   Date Value Ref Range Status   11/21/2024 >60.0 >60 mL/min/1.73 m^2 Final     Lab Results   Component Value Date    WBC 7.13 11/21/2024    HGB 15.2 11/21/2024    HCT 44.5 11/21/2024    MCV 89 11/21/2024     11/21/2024           IMAGING:      All imaging data listed below was independently interpreted and reviewed with patient in clinic today         12/9/2024  MRI PROSTATE W W/O CONTRAST    CLINICAL HISTORY:  Prostate cancer suspected;  Elevated prostate specific antigen (PSA)     Additional history: None provided.     TECHNIQUE:  Multiparametric MRI of the prostate/pelvis performed on a 3T scanner with phase pelvic coil. Multiplanar, multisequence images including high resolution, small field-of-view T2-WI; axial diffusion weighted images with multiple B-values and creation of ADC-maps; and dynamic contrast enhanced T1-weighted images through the prostate were obtained before, during, and after the administration of 10 cc intravenous gadolinium.     COMPARISON:  MRI prostate 11/13/2023     FINDINGS:  Previous biopsy: Negative 11/26/2023     PSA: 12.5 ng/mL 11/21/2024     Prior therapy: None      Prostate: 4.5 x 3.3 x 4.0 cm corresponding to a computed volume of 29.5 cc.     Prostate density: 0.4     Peripheral zone: Diffuse mild T2 hypointensity, score 2.     Transitional zone: Benign prostatic hyperplasia without focal suspicious abnormality, score 2.     Neurovascular bundle: Normal     Seminal vesicles: Normal appearance.     Lymphadenopathy: None.     Impression:     No suspicious prostate lesion.     Overall Assessment: PI-RADS 2 - Low (clinically significant cancer is unlikely to be present)     Number of targets created for potential MR/US fusion biopsy     Peripheral zone: 0     Transition zone: 0     Electronically signed by resident: Ember Barcenas  Date:                                            12/09/2024  Time:                                           15:37     Electronically signed by:Gelacio Anguiano MD  Date:                                            12/09/2024  Time:                                           16:20         ASSESSMENT/PLAN:       78 yo M w/ hx of low risk prostate cancer      Plan:     Prostate Cancer, low risk  Today I reviewed with the patient his urologic history, PSA trend, and imaging. We determined that he still has NCCN low risk prostate cancer. I pointed out that he is still a good candidate for active surveillance , but that at any time he could choose to engage in active treatment. I told him that 50% of men with his disease classification are still on active surveillance 10 years later. I also informed him that the medical community is unaware of any man dying of prostate cancer with pure Nicolas 3+3 disease.     Again I briefly informed him of the potential treatment options if he so chose like radiation, surgery, or ablation.     Through a shared decision making process I encouraged him to continue active surveillance but left the choice up to him. We did talk about some reasons to transition to active treatment such as (1) a greater volume of cancer detected at  biopsy, (2) higher grade cancer at biopsy, (3) patient concern or preference.  He currently does NOT fit the above criteria.    Lastly, I told him that I think we should continue on a low intensity protocol of q1yr [place] and PSA. And tumor burden reassessment every 1-3 years or more.    Finally I told the patient that even though we might think the patient is a candidate for AS, understaging and undergrading does occur, and knowledge of whether the patient is actually a good candidate is incomplete.  I could not make any guarantee that the patient was making the right decision or that his cancer may later be cured if the patient eventually opted for treatment. The patient expressed understanding of these issues.     Ultimately, through a shared decision making process he would like to continue AS.     Today patient has rising PSA in setting of active surveillance. Discussed that MRI is negative for high grade lesions. Discussed continuation on active surveillance. He is ok with plan. He would like CT AP to ensure back pain not related to rising PSA. Think this is reasonable. Discussed avoiding biopsy at this time until further appreciable rise.     Discussed limitations of not having biopsy in confirming new higher risk prostate cancer. Patient aware.       Gross Hematuria  -Resolved  -Reported hematuria in past at time of UTI which was treated  -Discussed risks and benefits of cystoscopy   -Repeat UA negative       Will get CT AP w contrast and f/u VV.           Maury Cartagena MD  Urologic Oncology  P: [phone]

## 2025-02-01 ENCOUNTER — HOSPITAL ENCOUNTER (OUTPATIENT)
Dept: RADIOLOGY | Facility: HOSPITAL | Age: 80
Discharge: HOME | End: 2025-02-01
Attending: STUDENT IN AN ORGANIZED HEALTH CARE EDUCATION/TRAINING PROGRAM
Payer: MEDICARE

## 2025-02-01 DIAGNOSIS — C61 PROSTATE CANCER: ICD-10-CM

## 2025-02-01 LAB
CREAT SERPL-MCNC: 0.9 MG/DL (ref 0.5–1.4)
SAMPLE: NORMAL

## 2025-02-01 PROCEDURE — 25500020 PHARM REV CODE 255: Mod: HCNC | Performed by: STUDENT IN AN ORGANIZED HEALTH CARE EDUCATION/TRAINING PROGRAM

## 2025-02-01 PROCEDURE — 74177 CT ABD & PELVIS W/CONTRAST: CPT | Mod: TC,HCNC

## 2025-02-01 PROCEDURE — 74177 CT ABD & PELVIS W/CONTRAST: CPT | Mod: 26,HCNC,, | Performed by: RADIOLOGY

## 2025-02-01 RX ADMIN — IOHEXOL 100 ML: 350 INJECTION, SOLUTION INTRAVENOUS at 08:02

## 2025-02-02 NOTE — PROGRESS NOTES
[facility]  Urologic Oncology Clinic Note        Telehealth visit  Visit type: audio + visual   Patient location : Home      Patient was identified by name and birth date. The patient agreed to proceed with a telehealth visit. The visit was performed by audio and video communication.       Date of Service: 2/3/2025      Chief Complaint/Reason for Consultation:  Low Risk Prostate Cancer    Requesting Provider:   No referring provider defined for this encounter.      History of Present Illness:   Patient 79 y.o. male presents with hx of low risk prostate cancer.      No family hx of prostate cancer.     Only voiding complaint is daytime frequency. Nocturia 0-1. Some mild ED, not taking PDE5I.     No other new changes in health. Having some trouble with right lower extremity, but no heart attacks or strokes.     At last visit we discussed slight rise in his PSA in the absence of new high grade lesion on MRI. Informed decision to continue active surveillance at this time. He had concerns about low back pain for which we obtained a CT AP with contrast which demonstrated no evidence of metastatic prostate cancer to nodes or bones.    Urologic History:      10/7/2022 -- PSA 7.4  11/4/2022 -- mpMRI prostate -- PV 32cc, PSA 7.4, PSAD 0.23; MARKUS 1 PIRADS 3 Left anterior TZ  11/30/2022 -- UroNav + systematic bx -- LA 3+3 1/1 cores, target negative, all other cores benign  12/29/2022 -- IPSS 13, nocturia 0-1; EUGENE 21  6/2/2023 -- PSA 9.6  11/13/2023 -- PSA 9.4  11/13/2023 -- mpMRI prostate -- PV 31, PSA 9, PSAD 0.3 -- stable 1.8cm PIRADS 3 lesion in left TZ  11/16/2023 -- Uronav + systematic bx -- LLA 3+3, LMA 3+3, LLM 3+3, target 1 3+3  5/20/2024 -- PSA 10.5  11/21/2024 -- PSA 12.5   12/9/2024 -- mpMRI prostate -- PV 29, PSA 12, PSAD 0.4 -- no high grade lesions seen in the prostate.   2/1/2024 -- CT AP w/contrast -- negative for obvious metastatic disease in the setting of rising PSA w/ low grade prostate cancer  history    Patient Active Problem List    Diagnosis Date Noted    Severe obesity (BMI 35.0-39.9) with comorbidity 11/30/2023    Pain 06/19/2023    Prostate cancer 04/27/2023    Annual physical exam 10/01/2021    Degenerative lumbar disc 03/08/2021    Seasonal allergic rhinitis due to pollen 04/21/2020    Obesity (BMI 30.0-34.9) 03/03/2020    Prostate cancer screening 03/03/2020    Bronchitis 11/19/2019    Mixed hyperlipidemia 08/22/2019    Benign essential hypertension 08/22/2019    Colon adenoma 07/19/2019    Posterior vitreous detachment of both eyes 10/25/2012    Nuclear sclerotic cataract of both eyes 10/25/2012          Review of patient's allergies indicates:  No Known Allergies       Past Medical History:   Diagnosis Date    Degenerative lumbar disc 03/08/2021    Diverticulosis     Hemorrhoids     High cholesterol     History of colon polyps 6/2/99, 6/3/19    Hypertension     Prostate cancer       Past Surgical History:   Procedure Laterality Date    APPENDECTOMY  01/01/1954    COLONOSCOPY  06/03/2019    [facility]    COLONOSCOPY N/A 07/19/2019    Procedure: COLONOSCOPY/pos emr;  Surgeon: Shantanu Lee MD;  Location: [address]);  Service: Endoscopy;  Laterality: N/A;  Referred by Dr. BERTO Keyes-see media tab for scanned records-      HT: 5'11, WT: 254.4, BMI: 35.5      Family History   Problem Relation Name Age of Onset    Heart attack Father Peng Sr.     Heart disease Father Peng Sr.         Congestive heart failure    No Known Problems Mother      No Known Problems Maternal Aunt      No Known Problems Maternal Uncle      No Known Problems Paternal Aunt      No Known Problems Paternal Uncle      No Known Problems Paternal Grandmother      No Known Problems Paternal Grandfather      No Known Problems Maternal Grandmother      No Known Problems Maternal Grandfather      Cataracts Sister Zeenat     Colon cancer Sister Zeenat     Cancer Sister Zeenat     Breast cancer Sister Keely      Cancer Sister Keely     No Known Problems Sister      No Known Problems Sister      No Known Problems Sister      No Known Problems Brother      Amblyopia Neg Hx      Blindness Neg Hx      Diabetes Neg Hx      Glaucoma Neg Hx      Hypertension Neg Hx      Macular degeneration Neg Hx      Retinal detachment Neg Hx      Strabismus Neg Hx      Stroke Neg Hx      Thyroid disease Neg Hx        Social History     Tobacco Use    Smoking status: Former     Current packs/day: 0.00     Average packs/day: 0.5 packs/day for 30.0 years (15.0 ttl pk-yrs)     Types: Cigarettes     Start date: 1960     Quit date:      Years since quittin.1     Passive exposure: Never    Smokeless tobacco: Former     Types: Chew     Quit date:    Substance Use Topics    Alcohol use: Not Currently     Comment: 12 beers per year.        Review of Systems   Constitutional:  Negative for activity change and fatigue.   HENT:  Negative for congestion.    Respiratory:  Negative for cough.    Cardiovascular:  Negative for chest pain.   Gastrointestinal:  Negative for abdominal pain.   Genitourinary:  Positive for dysuria and hematuria. Negative for flank pain.             OBJECTIVE:     Virtual visit so no exam performed.        LAB:      All laboratory data listed below was independently interpreted and reviewed with patient in clinic today      CMP  Sodium   Date Value Ref Range Status   2024 139 136 - 145 mmol/L Final     Potassium   Date Value Ref Range Status   2024 4.4 3.5 - 5.1 mmol/L Final     Chloride   Date Value Ref Range Status   2024 103 95 - 110 mmol/L Final     CO2   Date Value Ref Range Status   2024 29 23 - 29 mmol/L Final     Glucose   Date Value Ref Range Status   2024 97 70 - 110 mg/dL Final     BUN   Date Value Ref Range Status   2024 21 8 - 23 mg/dL Final     Creatinine   Date Value Ref Range Status   2024 0.8 0.5 - 1.4 mg/dL Final     Calcium   Date Value Ref Range Status    11/21/2024 9.5 8.7 - 10.5 mg/dL Final     Total Protein   Date Value Ref Range Status   11/21/2024 7.0 6.0 - 8.4 g/dL Final     Albumin   Date Value Ref Range Status   11/21/2024 3.9 3.5 - 5.2 g/dL Final     Total Bilirubin   Date Value Ref Range Status   11/21/2024 0.6 0.1 - 1.0 mg/dL Final     Comment:     For infants and newborns, interpretation of results should be based  on gestational age, weight and in agreement with clinical  observations.    Premature Infant recommended reference ranges:  Up to 24 hours.............<8.0 mg/dL  Up to 48 hours............<12.0 mg/dL  3-5 days..................<15.0 mg/dL  6-29 days.................<15.0 mg/dL       Alkaline Phosphatase   Date Value Ref Range Status   11/21/2024 72 40 - 150 U/L Final     AST   Date Value Ref Range Status   11/21/2024 20 10 - 40 U/L Final     ALT   Date Value Ref Range Status   11/21/2024 26 10 - 44 U/L Final     Anion Gap   Date Value Ref Range Status   11/21/2024 7 (L) 8 - 16 mmol/L Final     eGFR   Date Value Ref Range Status   11/21/2024 >60.0 >60 mL/min/1.73 m^2 Final     Lab Results   Component Value Date    WBC 7.13 11/21/2024    HGB 15.2 11/21/2024    HCT 44.5 11/21/2024    MCV 89 11/21/2024     11/21/2024           IMAGING:      All imaging data listed below was independently interpreted and reviewed with patient in clinic today    2/1/2025  CT ABDOMEN PELVIS WITH IV CONTRAST     CLINICAL HISTORY:  elevated PSA, prostate cancer;  Malignant neoplasm of prostate     TECHNIQUE:  Low dose axial images, sagittal and coronal reformations were obtained from the lung bases to the pubic symphysis following the IV administration of 100 mL of Omnipaque 350 intravenous contrast.     COMPARISON:  MRI prostate with without 12/09/2024, 11/13/2023, 11/04/2022     FINDINGS:  Lungs:  No consolidation or pleural effusion in the visualized lung bases.     Heart: Normal size.     Liver: Enlarged, 19 cm.  No focal abnormality.     Gallbladder:  Normally distended without calcified gallstones.  No pericholecystic inflammatory changes.     Bile ducts: No intrahepatic or extrahepatic biliary ductal dilatation.     Spleen: Normal size.     Pancreas: Fatty atrophy.  No mass or ductal dilatation.  No peripancreatic fat stranding.     Adrenals: No significant abnormality     Renal/Ureters: The kidneys enhance symmetrically.  Multiple focal simple fluid attenuating left renal hypodensities, the largest measures 4.1 cm.  No hydroureteronephrosis or stones.  The urinary bladder is unremarkable.     Reproductive: Mildly enlarged prostate with dystrophic calcifications.     Stomach/Bowel no evidence of obstruction or inflammatory changes.     Peritoneum: No free fluid. No intraperitoneal free air.     Lymph Nodes: No pathologic kameron enlargement in the abdomen or pelvis.     Vasculature: Abdominal aorta tapers normally.  Mild calcified atherosclerosis of the abdominal aorta and its branches. Portal vasculature, SMV, and splenic vein are patent.     Bones: Degenerative changes of the spine.  No osseous destructive changes.     Soft Tissues: No significant abnormality.     Impression:     Left simple renal cysts.     Hepatomegaly.     No CT evidence of metastatic disease.     Electronically signed by resident: Juno Olivares  Date:                                            02/02/2025  Time:                                           16:05     Electronically signed by:Fazal Miles  Date:                                            02/02/2025  Time:                                           16:45       ASSESSMENT/PLAN:       78 yo M w/ hx of low risk prostate cancer      Plan:     Prostate Cancer, low risk  Today I reviewed with the patient his urologic history, PSA trend, and imaging. We determined that he still has NCCN low risk prostate cancer. I pointed out that he is still a good candidate for active surveillance , but that at any time he could choose to engage in active  treatment. I told him that 50% of men with his disease classification are still on active surveillance 10 years later. I also informed him that the medical community is unaware of any man dying of prostate cancer with pure Nicolas 3+3 disease.     Again I briefly informed him of the potential treatment options if he so chose like radiation, surgery, or ablation.     Through a shared decision making process I encouraged him to continue active surveillance but left the choice up to him. We did talk about some reasons to transition to active treatment such as (1) a greater volume of cancer detected at biopsy, (2) higher grade cancer at biopsy, (3) patient concern or preference.  He currently does NOT fit the above criteria.    Lastly, I told him that I think we should continue on a low intensity protocol of q1yr [place] and PSA. And tumor burden reassessment every 1-3 years or more.    Finally I told the patient that even though we might think the patient is a candidate for AS, understaging and undergrading does occur, and knowledge of whether the patient is actually a good candidate is incomplete.  I could not make any guarantee that the patient was making the right decision or that his cancer may later be cured if the patient eventually opted for treatment. The patient expressed understanding of these issues.     Ultimately, through a shared decision making process he would like to continue AS.     Today we reviewed with patient CT scan for concerns of low back pain w/ rising PSA on active surveillance for his prostate cancer. There is no evidence of metastatic disease as such will continue on surveillance w/ PSA in 6 months.       Gross Hematuria  -Resolved  -Reported hematuria in past at time of UTI which was treated  -Discussed risks and benefits of cystoscopy   -Repeat UA negative          Maury Cartagena MD  Urologic Oncology  P: [phone]

## 2025-02-03 ENCOUNTER — OFFICE VISIT (OUTPATIENT)
Dept: UROLOGY | Facility: CLINIC | Age: 80
End: 2025-02-03
Payer: MEDICARE

## 2025-02-03 DIAGNOSIS — C61 PROSTATE CANCER: Primary | ICD-10-CM

## 2025-02-22 DIAGNOSIS — Z00.00 ENCOUNTER FOR MEDICARE ANNUAL WELLNESS EXAM: ICD-10-CM

## 2025-03-09 ENCOUNTER — ANESTHESIA EVENT (OUTPATIENT)
Dept: ENDOSCOPY | Facility: HOSPITAL | Age: 80
End: 2025-03-09
Payer: MEDICARE

## 2025-03-10 ENCOUNTER — ANESTHESIA (OUTPATIENT)
Dept: ENDOSCOPY | Facility: HOSPITAL | Age: 80
End: 2025-03-10
Payer: MEDICARE

## 2025-03-10 ENCOUNTER — HOSPITAL ENCOUNTER (OUTPATIENT)
Facility: HOSPITAL | Age: 80
Discharge: HOME | End: 2025-03-10
Attending: INTERNAL MEDICINE | Admitting: INTERNAL MEDICINE
Payer: MEDICARE

## 2025-03-10 VITALS
RESPIRATION RATE: 13 BRPM | WEIGHT: 245.56 LBS | HEART RATE: 66 BPM | DIASTOLIC BLOOD PRESSURE: 69 MMHG | SYSTOLIC BLOOD PRESSURE: 149 MMHG | OXYGEN SATURATION: 98 % | BODY MASS INDEX: 34.38 KG/M2 | TEMPERATURE: 98 F | HEIGHT: 71 IN

## 2025-03-10 DIAGNOSIS — K63.5 COLON POLYPS: ICD-10-CM

## 2025-03-10 DIAGNOSIS — D12.6 COLON ADENOMA: ICD-10-CM

## 2025-03-10 DIAGNOSIS — E66.811 OBESITY (BMI 30.0-34.9): Primary | ICD-10-CM

## 2025-03-10 PROCEDURE — 25000003 PHARM REV CODE 250: Mod: HCNC | Performed by: INTERNAL MEDICINE

## 2025-03-10 PROCEDURE — 88305 TISSUE EXAM BY PATHOLOGIST: CPT | Mod: HCNC | Performed by: PATHOLOGY

## 2025-03-10 PROCEDURE — 37000009 HC ANESTHESIA EA ADD 15 MINS: Mod: HCNC | Performed by: INTERNAL MEDICINE

## 2025-03-10 PROCEDURE — 63600175 PHARM REV CODE 636 W HCPCS: Mod: HCNC | Performed by: NURSE ANESTHETIST, CERTIFIED REGISTERED

## 2025-03-10 PROCEDURE — E9220 PRA ENDO ANESTHESIA: HCPCS | Mod: PT,HCNC,, | Performed by: NURSE ANESTHETIST, CERTIFIED REGISTERED

## 2025-03-10 PROCEDURE — 27201089 HC SNARE, DISP (ANY): Mod: HCNC | Performed by: INTERNAL MEDICINE

## 2025-03-10 PROCEDURE — 37000008 HC ANESTHESIA 1ST 15 MINUTES: Mod: HCNC | Performed by: INTERNAL MEDICINE

## 2025-03-10 PROCEDURE — 27201012 HC FORCEPS, HOT/COLD, DISP: Mod: HCNC | Performed by: INTERNAL MEDICINE

## 2025-03-10 PROCEDURE — 45380 COLONOSCOPY AND BIOPSY: CPT | Mod: PT,HCNC,, | Performed by: INTERNAL MEDICINE

## 2025-03-10 PROCEDURE — 45380 COLONOSCOPY AND BIOPSY: CPT | Mod: PT,HCNC | Performed by: INTERNAL MEDICINE

## 2025-03-10 RX ORDER — PROPOFOL 10 MG/ML
VIAL (ML) INTRAVENOUS
Status: DISCONTINUED | OUTPATIENT
Start: 2025-03-10 | End: 2025-03-10

## 2025-03-10 RX ORDER — LIDOCAINE HYDROCHLORIDE 20 MG/ML
INJECTION, SOLUTION EPIDURAL; INFILTRATION; INTRACAUDAL; PERINEURAL
Status: DISCONTINUED | OUTPATIENT
Start: 2025-03-10 | End: 2025-03-10

## 2025-03-10 RX ORDER — SODIUM CHLORIDE 9 MG/ML
INJECTION, SOLUTION INTRAVENOUS CONTINUOUS
Status: DISCONTINUED | OUTPATIENT
Start: 2025-03-10 | End: 2025-03-10 | Stop reason: HOSPADM

## 2025-03-10 RX ADMIN — PROPOFOL 150 MCG/KG/MIN: 10 INJECTION, EMULSION INTRAVENOUS at 07:03

## 2025-03-10 RX ADMIN — SODIUM CHLORIDE: 0.9 INJECTION, SOLUTION INTRAVENOUS at 07:03

## 2025-03-10 RX ADMIN — PROPOFOL 60 MG: 10 INJECTION, EMULSION INTRAVENOUS at 07:03

## 2025-03-10 RX ADMIN — LIDOCAINE HYDROCHLORIDE 50 MG: 20 INJECTION, SOLUTION EPIDURAL; INFILTRATION; INTRACAUDAL; PERINEURAL at 07:03

## 2025-03-10 NOTE — H&P
Short Stay Endoscopy History and Physical    PCP - Joe Torres MD    Procedure - Colonoscopy  ASA - per anesthesia  Mallampati - per anesthesia  History of Anesthesia problems - no  Family history Anesthesia problems - no   Plan of anesthesia - General    HPI:  This is a 79 y.o. male here for evaluation of : personal history of colon polyps      ROS:  Constitutional: No fevers, chills, No weight loss  CV: No chest pain  Pulm: No cough, No shortness of breath  GI: see HPI  Derm: No rash    Medical History:  has a past medical history of Degenerative lumbar disc (03/08/2021), Diverticulosis, Hemorrhoids, High cholesterol, History of colon polyps (6/2/99, 6/3/19), Hypertension, and Prostate cancer.    Surgical History:  has a past surgical history that includes Appendectomy (01/01/1954); Colonoscopy (06/03/2019); and Colonoscopy (N/A, 07/19/2019).    Family History: family history includes Breast cancer in his sister; Cancer in his sister and sister; Cataracts in his sister; Colon cancer in his sister; Heart attack in his father; Heart disease in his father; No Known Problems in his brother, maternal aunt, maternal grandfather, maternal grandmother, maternal uncle, mother, paternal aunt, paternal grandfather, paternal grandmother, paternal uncle, sister, sister, and sister.. Otherwise no colon cancer, inflammatory bowel disease, or GI malignancies.    Social History:  reports that he quit smoking about 35 years ago. His smoking use included cigarettes. He started smoking about 65 years ago. He has a 15 pack-year smoking history. He has never been exposed to tobacco smoke. He quit smokeless tobacco use about 35 years ago.  His smokeless tobacco use included chew. He reports that he does not currently use alcohol. He reports that he does not use drugs.    Review of patient's allergies indicates:  No Known Allergies    Medications:   Prescriptions Prior to Admission[1]      Physical Exam:    Vital Signs:    Vitals:    03/10/25 0721   BP: 132/69   Pulse:    Resp:    Temp:        General Appearance: Well appearing in no acute distress  Eyes:    No scleral icterus  ENT: Neck supple, Lips, mucosa, and tongue normal; teeth and gums normal  Abdomen: Soft, non tender, non distended with positive bowel sounds. No hepatosplenomegaly, ascites, or mass.  Extremities: 2+ pulses, no clubbing, cyanosis or edema  Skin: No rash      Labs:  Lab Results   Component Value Date    WBC 7.13 11/21/2024    HGB 15.2 11/21/2024    HCT 44.5 11/21/2024     11/21/2024    CHOL 146 11/21/2024    TRIG 57 11/21/2024    HDL 50 11/21/2024    ALT 26 11/21/2024    AST 20 11/21/2024     11/21/2024    K 4.4 11/21/2024     11/21/2024    CREATININE 0.8 11/21/2024    BUN 21 11/21/2024    CO2 29 11/21/2024    PSA 11.6 (H) 11/28/2023       I have explained the risks and benefits of endoscopy procedures to the patient including but not limited to bleeding, perforation, infection, and death.  The patient was asked if they understand and allowed to ask any further questions to their satisfaction.    Sherif Moore MD         [1]   Medications Prior to Admission   Medication Sig Dispense Refill Last Dose/Taking    atorvastatin (LIPITOR) 10 MG tablet TAKE 1 TABLET EVERY DAY 90 tablet 3 3/9/2025    hydroCHLOROthiazide (MICROZIDE) 12.5 mg capsule TAKE 1 CAPSULE EVERY DAY 90 capsule 3 3/9/2025    losartan (COZAAR) 50 MG tablet TAKE 1 TABLET EVERY DAY 90 tablet 3 3/9/2025    fluticasone propionate (FLONASE) 50 mcg/actuation nasal spray 1 spray (50 mcg total) by Each Nostril route once daily. 9.9 mL 0 More than a month    tadalafiL (CIALIS) 10 MG tablet Take 1 tablet (10 mg total) by mouth daily as needed for Erectile Dysfunction. 20 tablet 3

## 2025-03-10 NOTE — ANESTHESIA PREPROCEDURE EVALUATION
03/10/2025  Peng Sullivan is a 79 y.o., male.      Pre-op Assessment    I have reviewed the Patient Summary Reports.     I have reviewed the Nursing Notes. I have reviewed the NPO Status.   I have reviewed the Medications.     Review of Systems  Anesthesia Hx:  No problems with previous Anesthesia             Denies Family Hx of Anesthesia complications.    Denies Personal Hx of Anesthesia complications.                    Hematology/Oncology:  Hematology Normal                Prostate            Current/Recent Cancer.                EENT/Dental:  EENT/Dental Normal           Cardiovascular:  Exercise tolerance: good   Hypertension              ECG has been reviewed.                            Pulmonary:  Pulmonary Normal                       Renal/:  Renal/ Normal                 Hepatic/GI:  Hepatic/GI Normal                    Musculoskeletal:  Arthritis    Musculoskeletal General/Symptoms:            Lumbar Spine Disorders, Lumbar Disc Disease   Neurological:  Neurology Normal                                      Endocrine:  Endocrine Normal          Morbid Obesity / BMI > 40  Dermatological:  Skin Normal    Psych:  Psychiatric Normal                    Physical Exam  General: Well nourished    Airway:  Mallampati: III   Mouth Opening: Normal  TM Distance: Normal  Tongue: Normal  Neck ROM: Normal ROM    Dental:  Intact        Anesthesia Plan  Type of Anesthesia, risks & benefits discussed:    Anesthesia Type: Gen Natural Airway  Intra-op Monitoring Plan: Standard ASA Monitors  Post Op Pain Control Plan: multimodal analgesia and IV/PO Opioids PRN  Induction:  IV  Informed Consent: Informed consent signed with the Patient and all parties understand the risks and agree with anesthesia plan.  All questions answered. Patient consented to blood products? No  ASA Score: 2  Day of Surgery Review of  History & Physical: H&P Update referred to the surgeon/provider.I have interviewed and examined the patient. I have reviewed the patient's H&P dated: There are no significant changes.     Ready For Surgery From Anesthesia Perspective.     .

## 2025-03-10 NOTE — PLAN OF CARE
POC reviewed with patient at bedside. Questions/concerns addressed. Verbalized understanding of instructions. '

## 2025-03-10 NOTE — TRANSFER OF CARE
"Anesthesia Transfer of Care Note    Patient: Peng Sullivan    Procedure(s) Performed: Procedure(s) (LRB):  COLONOSCOPY, SCREENING, HIGH RISK PATIENT (N/A)    Patient location: GI    Anesthesia Type: general    Transport from OR: Transported from OR on room air with adequate spontaneous ventilation    Post pain: adequate analgesia    Post assessment: no apparent anesthetic complications    Post vital signs: stable    Level of consciousness: awake and alert    Nausea/Vomiting: no nausea/vomiting    Complications: none    Transfer of care protocol was followed      Last vitals: Visit Vitals  /67   Pulse 71   Temp 36.5 °C (97.7 °F) (Temporal)   Resp 12   Ht 5' 11" (1.803 m)   Wt 111.4 kg (245 lb 9.5 oz)   SpO2 96%   BMI 34.25 kg/m²     "

## 2025-03-10 NOTE — ANESTHESIA POSTPROCEDURE EVALUATION
Anesthesia Post Evaluation    Patient: Peng Sullivan    Procedure(s) Performed: Procedure(s) (LRB):  COLONOSCOPY, SCREENING, HIGH RISK PATIENT (N/A)    Final Anesthesia Type: general      Patient location during evaluation: PACU  Patient participation: Yes- Able to Participate  Level of consciousness: awake and alert  Post-procedure vital signs: reviewed and stable  Pain management: adequate  Airway patency: patent    PONV status at discharge: No PONV  Anesthetic complications: no      Cardiovascular status: blood pressure returned to baseline  Respiratory status: spontaneous ventilation and room air  Hydration status: euvolemic  Follow-up not needed.              Vitals Value Taken Time   /69 03/10/25 08:22   Pulse 66 03/10/25 08:22   Resp 13 03/10/25 08:22   SpO2 98 % 03/10/25 08:22         Event Time   Out of Recovery 08:32:41         Pain/Frank Score: Frank Score: 10 (3/10/2025  8:08 AM)

## 2025-03-10 NOTE — PROVATION PATIENT INSTRUCTIONS
Discharge Summary/Instructions after an Endoscopic Procedure  Patient Name: Peng Sullivan  Patient MRN: [id number]  Patient YOB: 1945  Monday, March 10, 2025  Sherif Moore MD  Dear patient,  As a result of recent federal legislation (The Federal Cures Act), you may   receive lab or pathology results from your procedure in your [facility]   account before your physician is able to contact you. Your physician or   their representative will relay the results to you with their   recommendations at their soonest availability.  Thank you,  RESTRICTIONS:  During your procedure today, you received medications for sedation.  These   medications may affect your judgment, balance and coordination.  Therefore,   for 24 hours, you have the following restrictions:   - DO NOT drive a car, operate machinery, make legal/financial decisions,   sign important papers or drink alcohol.    ACTIVITY:  Today: no heavy lifting, straining or running due to procedural   sedation/anesthesia.  The following day: return to full activity including work.  DIET:  Eat and drink normally unless instructed otherwise.     TREATMENT FOR COMMON SIDE EFFECTS:  - Mild abdominal pain, nausea, belching, bloating or excessive gas:  rest,   eat lightly and use a heating pad.  - Sore Throat: treat with throat lozenges and/or gargle with warm salt   water.  - Because air was used during the procedure, expelling large amounts of air   from your rectum or belching is normal.  - If a bowel prep was taken, you may not have a bowel movement for 1-3 days.    This is normal.  SYMPTOMS TO WATCH FOR AND REPORT TO YOUR PHYSICIAN:  1. Abdominal pain or bloating, other than gas cramps.  2. Chest pain.  3. Back pain.  4. Signs of infection such as: chills or fever occurring within 24 hours   after the procedure.  5. Rectal bleeding, which would show as bright red, maroon, or black stools.   (A tablespoon of blood from the rectum is not serious, especially if    hemorrhoids are present.)  6. Vomiting.  7. Weakness or dizziness.  GO DIRECTLY TO THE NEAREST EMERGENCY ROOM IF YOU HAVE ANY OF THE FOLLOWING:      Difficulty breathing              Chills and/or fever over 101 F   Persistent vomiting and/or vomiting blood   Severe abdominal pain   Severe chest pain   Black, tarry stools   Bleeding- more than one tablespoon   Any other symptom or condition that you feel may need urgent attention  Your doctor recommends these additional instructions:  If any biopsies were taken, your doctors clinic will contact you in 1 to 2   weeks with any results.  - Discharge patient to home.   - Await pathology results.   - Repeat colonoscopy is not recommended due to current age (66 years or   older) for surveillance.   - The findings and recommendations were discussed with the designated   responsible adult.  For questions, problems or results please call your physician - Sherif Moore MD at Work:  [phone].  [facility], EMERGENCY ROOM PHONE NUMBER: [phone]  IF A COMPLICATION OR EMERGENCY SITUATION ARISES AND YOU ARE UNABLE TO REACH   YOUR PHYSICIAN - GO DIRECTLY TO THE EMERGENCY ROOM.  Sherif Moore MD  3/10/2025 7:50:24 AM  This report has been verified and signed electronically.  Dear patient,  As a result of recent federal legislation (The Federal Cures Act), you may   receive lab or pathology results from your procedure in your [facility]   account before your physician is able to contact you. Your physician or   their representative will relay the results to you with their   recommendations at their soonest availability.  Thank you,  PROVATION

## 2025-03-14 ENCOUNTER — RESULTS FOLLOW-UP (OUTPATIENT)
Dept: GASTROENTEROLOGY | Facility: CLINIC | Age: 80
End: 2025-03-14
Payer: MEDICARE

## 2025-03-14 LAB
FINAL PATHOLOGIC DIAGNOSIS: NORMAL
GROSS: NORMAL
Lab: NORMAL

## 2025-03-20 ENCOUNTER — OFFICE VISIT (OUTPATIENT)
Dept: URGENT CARE | Facility: CLINIC | Age: 80
End: 2025-03-20
Payer: MEDICARE

## 2025-03-20 VITALS
WEIGHT: 245 LBS | BODY MASS INDEX: 34.17 KG/M2 | SYSTOLIC BLOOD PRESSURE: 108 MMHG | HEART RATE: 97 BPM | OXYGEN SATURATION: 98 % | DIASTOLIC BLOOD PRESSURE: 74 MMHG | RESPIRATION RATE: 16 BRPM | TEMPERATURE: 98 F

## 2025-03-20 DIAGNOSIS — R53.83 FATIGUE, UNSPECIFIED TYPE: Primary | ICD-10-CM

## 2025-03-20 LAB
CTP QC/QA: YES
CTP QC/QA: YES
POC MOLECULAR INFLUENZA A AGN: NEGATIVE
POC MOLECULAR INFLUENZA B AGN: NEGATIVE
SARS CORONAVIRUS 2 ANTIGEN: NEGATIVE

## 2025-03-20 NOTE — PROGRESS NOTES
Subjective:      Patient ID: Peng Sullivan is a 80 y.o. male.    Vitals:  weight is 111.1 kg (245 lb). His oral temperature is 98.4 °F (36.9 °C). His blood pressure is 108/74 and his pulse is 97. His respiration is 16 and oxygen saturation is 98%.     Chief Complaint: Bloated    This is a 80 y.o. male who presents today with a chief complaint of bloated abd, belching and fatigue since yesterday morning post drinking coffee without eating. Last BM today at approx 0800 (normal, soft). Pt says he does not have abd px, no chest px, no URI sx, no sore throat, H/A or NVD.     Home tx: Tums (no relief)    [facility]: colonoscopy on 3/10/2025 (one polyp)    Abdominal Pain  This is a new problem. The current episode started yesterday. The onset quality is gradual. The problem occurs constantly. The problem has been gradually improving. The pain is located in the generalized abdominal region. The pain is at a severity of 0/10. The patient is experiencing no pain. The abdominal pain does not radiate. Associated symptoms include anorexia, belching and flatus. Pertinent negatives include no constipation, diarrhea, dysuria, fever, headaches, hematuria, myalgias, nausea or vomiting. There is no history of abdominal surgery, colon cancer, Crohn's disease, gallstones, GERD or irritable bowel syndrome. Patient's medical history does not include kidney stones.       Constitution: Negative for fever.   Gastrointestinal:  Positive for abdominal pain. Negative for history of abdominal surgery, nausea, vomiting, constipation and diarrhea.   Genitourinary:  Negative for dysuria and hematuria.   Musculoskeletal:  Negative for muscle ache.   Neurological:  Negative for headaches.      Objective:     Physical Exam  Results for orders placed or performed in visit on 03/20/25   SARS Coronavirus 2 Antigen, POCT Manual Read    Collection Time: 03/20/25  9:05 AM   Result Value Ref Range    SARS Coronavirus 2 Antigen Negative Negative, Presumptive  Negative     Acceptable Yes    POCT Influenza A/B Molecular    Collection Time: 03/20/25  9:30 AM   Result Value Ref Range    POC Molecular Influenza A Ag Negative Negative    POC Molecular Influenza B Ag Negative Negative     Acceptable Yes       Assessment:     1. Fatigue, unspecified type    No localizing or concerning symptoms at this time. Advised to monitor symptoms and if develops localizing signs or worsening symptoms to return for further evaluation    Plan:       Fatigue, unspecified type  -     SARS Coronavirus 2 Antigen, POCT Manual Read  -     POCT Influenza A/B Molecular

## 2025-04-01 ENCOUNTER — PATIENT MESSAGE (OUTPATIENT)
Dept: GASTROENTEROLOGY | Facility: CLINIC | Age: 80
End: 2025-04-01
Payer: MEDICARE

## 2025-04-27 ENCOUNTER — PATIENT MESSAGE (OUTPATIENT)
Dept: PRIMARY CARE CLINIC | Facility: CLINIC | Age: 80
End: 2025-04-27
Payer: MEDICARE

## 2025-04-29 ENCOUNTER — OFFICE VISIT (OUTPATIENT)
Dept: PRIMARY CARE CLINIC | Facility: CLINIC | Age: 80
End: 2025-04-29
Payer: MEDICARE

## 2025-04-29 VITALS
DIASTOLIC BLOOD PRESSURE: 80 MMHG | WEIGHT: 247.56 LBS | HEART RATE: 78 BPM | OXYGEN SATURATION: 95 % | HEIGHT: 71 IN | BODY MASS INDEX: 34.66 KG/M2 | RESPIRATION RATE: 18 BRPM | SYSTOLIC BLOOD PRESSURE: 120 MMHG

## 2025-04-29 DIAGNOSIS — R60.0 EDEMA OF LEFT LOWER EXTREMITY: Primary | ICD-10-CM

## 2025-04-29 PROBLEM — E66.01 SEVERE OBESITY (BMI 35.0-39.9) WITH COMORBIDITY: Status: RESOLVED | Noted: 2023-11-30 | Resolved: 2025-04-29

## 2025-04-29 PROCEDURE — 99214 OFFICE O/P EST MOD 30 MIN: CPT | Mod: S$GLB,,, | Performed by: INTERNAL MEDICINE

## 2025-04-29 PROCEDURE — 3074F SYST BP LT 130 MM HG: CPT | Mod: CPTII,S$GLB,, | Performed by: INTERNAL MEDICINE

## 2025-04-29 PROCEDURE — 1126F AMNT PAIN NOTED NONE PRSNT: CPT | Mod: CPTII,S$GLB,, | Performed by: INTERNAL MEDICINE

## 2025-04-29 PROCEDURE — 3079F DIAST BP 80-89 MM HG: CPT | Mod: CPTII,S$GLB,, | Performed by: INTERNAL MEDICINE

## 2025-04-29 PROCEDURE — 1159F MED LIST DOCD IN RCRD: CPT | Mod: CPTII,S$GLB,, | Performed by: INTERNAL MEDICINE

## 2025-04-29 PROCEDURE — 3288F FALL RISK ASSESSMENT DOCD: CPT | Mod: CPTII,S$GLB,, | Performed by: INTERNAL MEDICINE

## 2025-04-29 PROCEDURE — 1101F PT FALLS ASSESS-DOCD LE1/YR: CPT | Mod: CPTII,S$GLB,, | Performed by: INTERNAL MEDICINE

## 2025-04-29 PROCEDURE — 99999 PR PBB SHADOW E&M-EST. PATIENT-LVL III: CPT | Mod: PBBFAC,,, | Performed by: INTERNAL MEDICINE

## 2025-04-29 PROCEDURE — 1160F RVW MEDS BY RX/DR IN RCRD: CPT | Mod: CPTII,S$GLB,, | Performed by: INTERNAL MEDICINE

## 2025-04-29 NOTE — PROGRESS NOTES
[facility] Note    Chief Complaint      Chief Complaint   Patient presents with    Follow-up     6m    Leg Swelling       History of Present Illness      Peng Sullivan is a 80 y.o. male who presents today for   Chief Complaint   Patient presents with    Follow-up     6m    Leg Swelling   .  Patient comes to appointment here for above . Was at [employer] earlier this month and tripped and had fall onto left side . Since then about a week ago noticed some  swelling in hie left calf . Denies pain in calf but lower ext as welling greater than in right .  .    HPI    No problem-specific Assessment & Plan notes found for this encounter.       Problem List Items Addressed This Visit       Edema of left lower extremity - Primary    Overview   Left lower ext edema after fall   Check u/s              Past Medical History:  Past Medical History:   Diagnosis Date    Degenerative lumbar disc 03/08/2021    Diverticulosis     Hemorrhoids     High cholesterol     History of colon polyps 6/2/99, 6/3/19    Hypertension     Prostate cancer        Past Surgical History:  Past Surgical History:   Procedure Laterality Date    APPENDECTOMY  01/01/1954    COLONOSCOPY  06/03/2019    [facility]    COLONOSCOPY N/A 07/19/2019    Procedure: COLONOSCOPY/pos emr;  Surgeon: Shantanu Lee MD;  Location: [facility] (2ND FLR);  Service: Endoscopy;  Laterality: N/A;  Referred by Dr. BERTO Keyes-see media tab for scanned records-      HT: 5'11, WT: 254.4, BMI: 35.5    COLONOSCOPY, SCREENING, HIGH RISK PATIENT N/A 3/10/2025    Procedure: COLONOSCOPY, SCREENING, HIGH RISK PATIENT;  Surgeon: Sherif Moore MD;  Location: [facility] (4TH FLR);  Service: Endoscopy;  Laterality: N/A;  ref by / [facility] portal-RB  2/28/25-Precall completed and confirmed-WENDY Spence RN       Family History:  family history includes Breast cancer in his sister; Cancer in his sister and sister; Cataracts in his  "sister; Colon cancer in his sister; Heart attack in his father; Heart disease in his father; No Known Problems in his brother, maternal aunt, maternal grandfather, maternal grandmother, maternal uncle, mother, paternal aunt, paternal grandfather, paternal grandmother, paternal uncle, sister, sister, and sister.     Social History:  Social History[1]    Review of Systems:   Review of Systems   Constitutional:  Negative for fever and weight loss.   HENT:  Negative for congestion, hearing loss and sore throat.    Eyes:  Negative for blurred vision.   Respiratory:  Negative for cough and shortness of breath.    Cardiovascular:  Positive for leg swelling. Negative for chest pain, palpitations and claudication.   Gastrointestinal:  Negative for abdominal pain, constipation, diarrhea and heartburn.   Genitourinary:  Negative for dysuria.   Musculoskeletal:  Negative for back pain and myalgias.   Skin:  Negative for rash.   Neurological:  Negative for focal weakness and headaches.   Psychiatric/Behavioral:  Negative for depression and suicidal ideas. The patient is not nervous/anxious.         Medications:  Encounter Medications[2]    Allergies:  Review of patient's allergies indicates:  No Known Allergies      Physical Exam      Vitals:    04/29/25 0949   BP: 120/80   Pulse: 78   Resp: 18        Vital Signs  Pulse: 78  Resp: 18  SpO2: 95 %  BP: 120/80  BP Location: Right arm  Patient Position: Sitting  Pain Score: 0-No pain  Height and Weight  Height: 5' 11" (180.3 cm)  Weight: 112.3 kg (247 lb 9.2 oz)  BSA (Calculated - sq m): 2.37 sq meters  BMI (Calculated): 34.5  Weight in (lb) to have BMI = 25: 178.9]     Body mass index is 34.53 kg/m².    Physical Exam  Constitutional:       Appearance: He is well-developed.   HENT:      Head: Normocephalic.   Eyes:      Pupils: Pupils are equal, round, and reactive to light.   Neck:      Thyroid: No thyromegaly.   Cardiovascular:      Rate and Rhythm: Normal rate and regular rhythm. " "     Heart sounds: No murmur heard.     No friction rub. No gallop.      Comments: Tr edema left   Pulmonary:      Effort: Pulmonary effort is normal.      Breath sounds: Normal breath sounds.   Abdominal:      General: Bowel sounds are normal.      Palpations: Abdomen is soft.   Musculoskeletal:         General: Normal range of motion.      Cervical back: Normal range of motion.      Right lower leg: No edema.      Left lower leg: Edema present.   Skin:     General: Skin is warm and dry.   Neurological:      Mental Status: He is alert and oriented to person, place, and time.      Sensory: No sensory deficit.   Psychiatric:         Behavior: Behavior normal.          Laboratory:  CBC:  No results for input(s): "WBC", "RBC", "HGB", "HCT", "PLT", "MCV", "MCH", "MCHC" in the last 2160 hours.  CMP:  No results for input(s): "GLU", "CALCIUM", "ALBUMIN", "PROT", "NA", "K", "CO2", "CL", "BUN", "ALKPHOS", "ALT", "AST", "BILITOT" in the last 2160 hours.    Invalid input(s): "CREATININ"  URINALYSIS:  No results for input(s): "COLORU", "CLARITYU", "SPECGRAV", "PHUR", "PROTEINUA", "GLUCOSEU", "BILIRUBINCON", "BLOODU", "WBCU", "RBCU", "BACTERIA", "MUCUS", "NITRITE", "LEUKOCYTESUR", "UROBILINOGEN", "HYALINECASTS" in the last 2160 hours.   LIPIDS:  No results for input(s): "TSH", "HDL", "CHOL", "TRIG", "LDLCALC", "CHOLHDL", "NONHDLCHOL", "TOTALCHOLEST" in the last 2160 hours.  TSH:  No results for input(s): "TSH" in the last 2160 hours.  A1C:  No results for input(s): "HGBA1C" in the last 2160 hours.    Radiology:        Assessment:     Peng Sullivan is a 80 y.o.male with:    Edema of left lower extremity  -     US Lower Extremity Veins Left; Future; Expected date: 04/29/2025                Plan:     Problem List Items Addressed This Visit       Edema of left lower extremity - Primary    Overview   Left lower ext edema after fall   Check u/s             As above, continue current medications and maintain follow up with " specialists.  Return to clinic as scheduled       [facility] Dantagn[facility]                       [1]   Social History  Socioeconomic History    Marital status:    Tobacco Use    Smoking status: Former     Current packs/day: 0.00     Average packs/day: 0.5 packs/day for 30.0 years (15.0 ttl pk-yrs)     Types: Cigarettes     Start date: 1960     Quit date:      Years since quittin.3     Passive exposure: Never    Smokeless tobacco: Former     Types: Chew     Quit date:    Substance and Sexual Activity    Alcohol use: Not Currently     Comment: 12 beers per year.    Drug use: No    Sexual activity: Yes     Partners: Female     Birth control/protection: None     Social Drivers of Health     Financial Resource Strain: Low Risk  (2025)    Overall Financial Resource Strain (CARDIA)     Difficulty of Paying Living Expenses: Not hard at all   Food Insecurity: No Food Insecurity (2025)    Hunger Vital Sign     Worried About Running Out of Food in the Last Year: Never true     Ran Out of Food in the Last Year: Never true   Transportation Needs: No Transportation Needs (2023)    PRAPARE - Transportation     Lack of Transportation (Medical): No     Lack of Transportation (Non-Medical): No   Physical Activity: Sufficiently Active (2025)    Exercise Vital Sign     Days of Exercise per Week: 6 days     Minutes of Exercise per Session: 30 min   Stress: No Stress Concern Present (2025)    [place] [employer] - Occupational Stress Questionnaire     Feeling of Stress : Not at all   Housing Stability: Low Risk  (2023)    Housing Stability Vital Sign     Unable to Pay for Housing in the Last Year: No     Number of Places Lived in the Last Year: 1     Unstable Housing in the Last Year: No   [2]   Outpatient Encounter Medications as of 2025   Medication Sig Dispense Refill    atorvastatin (LIPITOR) 10 MG tablet  TAKE 1 TABLET EVERY DAY 90 tablet 3    hydroCHLOROthiazide (MICROZIDE) 12.5 mg capsule TAKE 1 CAPSULE EVERY DAY 90 capsule 3    losartan (COZAAR) 50 MG tablet TAKE 1 TABLET EVERY DAY 90 tablet 3    tadalafiL (CIALIS) 10 MG tablet Take 1 tablet (10 mg total) by mouth daily as needed for Erectile Dysfunction. 20 tablet 3     No facility-administered encounter medications on file as of 4/29/2025.

## 2025-05-01 ENCOUNTER — HOSPITAL ENCOUNTER (OUTPATIENT)
Dept: RADIOLOGY | Facility: HOSPITAL | Age: 80
Discharge: HOME | End: 2025-05-01
Attending: INTERNAL MEDICINE
Payer: MEDICARE

## 2025-05-01 DIAGNOSIS — R60.0 EDEMA OF LEFT LOWER EXTREMITY: ICD-10-CM

## 2025-05-01 PROCEDURE — 93971 EXTREMITY STUDY: CPT | Mod: TC,LT

## 2025-05-01 PROCEDURE — 93971 EXTREMITY STUDY: CPT | Mod: 26,LT,, | Performed by: RADIOLOGY

## 2025-05-02 ENCOUNTER — RESULTS FOLLOW-UP (OUTPATIENT)
Dept: PRIMARY CARE CLINIC | Facility: CLINIC | Age: 80
End: 2025-05-02

## 2025-05-20 ENCOUNTER — OFFICE VISIT (OUTPATIENT)
Dept: PRIMARY CARE CLINIC | Facility: CLINIC | Age: 80
End: 2025-05-20
Payer: MEDICARE

## 2025-05-20 VITALS
DIASTOLIC BLOOD PRESSURE: 80 MMHG | BODY MASS INDEX: 35.06 KG/M2 | HEART RATE: 91 BPM | SYSTOLIC BLOOD PRESSURE: 120 MMHG | RESPIRATION RATE: 18 BRPM | WEIGHT: 250.44 LBS | HEIGHT: 71 IN | OXYGEN SATURATION: 97 %

## 2025-05-20 DIAGNOSIS — E66.811 OBESITY (BMI 30.0-34.9): ICD-10-CM

## 2025-05-20 DIAGNOSIS — C61 PROSTATE CANCER: ICD-10-CM

## 2025-05-20 DIAGNOSIS — E78.2 MIXED HYPERLIPIDEMIA: ICD-10-CM

## 2025-05-20 DIAGNOSIS — M51.369 DEGENERATION OF INTERVERTEBRAL DISC OF LUMBAR REGION, UNSPECIFIED WHETHER PAIN PRESENT: ICD-10-CM

## 2025-05-20 DIAGNOSIS — I10 BENIGN ESSENTIAL HYPERTENSION: Primary | ICD-10-CM

## 2025-05-20 PROCEDURE — 1101F PT FALLS ASSESS-DOCD LE1/YR: CPT | Mod: CPTII,S$GLB,, | Performed by: INTERNAL MEDICINE

## 2025-05-20 PROCEDURE — 1126F AMNT PAIN NOTED NONE PRSNT: CPT | Mod: CPTII,S$GLB,, | Performed by: INTERNAL MEDICINE

## 2025-05-20 PROCEDURE — 1160F RVW MEDS BY RX/DR IN RCRD: CPT | Mod: CPTII,S$GLB,, | Performed by: INTERNAL MEDICINE

## 2025-05-20 PROCEDURE — 99214 OFFICE O/P EST MOD 30 MIN: CPT | Mod: S$GLB,,, | Performed by: INTERNAL MEDICINE

## 2025-05-20 PROCEDURE — 3079F DIAST BP 80-89 MM HG: CPT | Mod: CPTII,S$GLB,, | Performed by: INTERNAL MEDICINE

## 2025-05-20 PROCEDURE — 3074F SYST BP LT 130 MM HG: CPT | Mod: CPTII,S$GLB,, | Performed by: INTERNAL MEDICINE

## 2025-05-20 PROCEDURE — 3288F FALL RISK ASSESSMENT DOCD: CPT | Mod: CPTII,S$GLB,, | Performed by: INTERNAL MEDICINE

## 2025-05-20 PROCEDURE — 1159F MED LIST DOCD IN RCRD: CPT | Mod: CPTII,S$GLB,, | Performed by: INTERNAL MEDICINE

## 2025-05-20 PROCEDURE — 99999 PR PBB SHADOW E&M-EST. PATIENT-LVL III: CPT | Mod: PBBFAC,,, | Performed by: INTERNAL MEDICINE

## 2025-05-20 NOTE — PROGRESS NOTES
[facility] Note    Chief Complaint      Chief Complaint   Patient presents with    Follow-up     6m       History of Present Illness      Peng Sullivan is a 80 y.o. male who presents today for   Chief Complaint   Patient presents with    Follow-up     6m   .  Patient comes to appointment here for 6m checkup for chronIc issues as below . He is comPLiant with all medications . He is uptodate With f/u for his prostate ca with dR GROVES ..    HPI    No problem-specific Assessment & Plan notes found for this encounter.       Problem List Items Addressed This Visit       Mixed hyperlipidemia    Overview    Cont current regimen atorvastatin is tolerating well . All labs reviewed          Benign essential hypertension    Overview   bp well controlled on current regimen . Cont same          Obesity (BMI 30.0-34.9)    Overview   counciled on diet and exercise. He continues with his walking program         Degenerative lumbar disc    Overview   Stable          Prostate cancer - Primary    Overview   Urology dr groves managing              Past Medical History:  Past Medical History:   Diagnosis Date    Degenerative lumbar disc 03/08/2021    Diverticulosis     Hemorrhoids     High cholesterol     History of colon polyps 6/2/99, 6/3/19    Hypertension     Prostate cancer        Past Surgical History:  Past Surgical History:   Procedure Laterality Date    APPENDECTOMY  01[birth year]01/1954    COLONOSCOPY  06/03/2019    [facility]    COLONOSCOPY N/A 07/19/2019    Procedure: COLONOSCOPY/pos emr;  Surgeon: Shantanu Lee MD;  Location: [facility] (2ND FLR);  Service: Endoscopy;  Laterality: N/A;  Referred by Dr. BERTO Keyes-see media tab for scanned records-      HT: 5'11, WT: 254.4, BMI: 35.5    COLONOSCOPY, SCREENING, HIGH RISK PATIENT N/A 3/10/2025    Procedure: COLONOSCOPY, SCREENING, HIGH RISK PATIENT;  Surgeon: Sherif Moore MD;  Location: [facility] (4TH FLR);  Service: Endoscopy;  Laterality: N/A;   "ref by / [facility] portal-RB  2/28/25-Precall completed and confirmed-WENDY Spence RN       Family History:  family history includes Breast cancer in his sister; Cancer in his sister and sister; Cataracts in his sister; Colon cancer in his sister; Heart attack in his father; Heart disease in his father; No Known Problems in his brother, maternal aunt, maternal grandfather, maternal grandmother, maternal uncle, mother, paternal aunt, paternal grandfather, paternal grandmother, paternal uncle, sister, sister, and sister.     Social History:  Social History[1]    Review of Systems:   Review of Systems   Constitutional:  Negative for fever and weight loss.   HENT:  Negative for congestion, hearing loss and sore throat.    Eyes:  Negative for blurred vision.   Respiratory:  Negative for cough and shortness of breath.    Cardiovascular:  Negative for chest pain, palpitations, claudication and leg swelling.   Gastrointestinal:  Negative for abdominal pain, constipation, diarrhea and heartburn.   Genitourinary:  Negative for dysuria.   Musculoskeletal:  Negative for back pain and myalgias.   Skin:  Negative for rash.   Neurological:  Negative for focal weakness and headaches.   Psychiatric/Behavioral:  Negative for depression and suicidal ideas. The patient is not nervous/anxious.         Medications:  Encounter Medications[2]    Allergies:  Review of patient's allergies indicates:  No Known Allergies      Physical Exam      Vitals:    05/20/25 0842   BP: 120/80   Pulse: 91   Resp: 18        Vital Signs  Pulse: 91  Resp: 18  SpO2: 97 %  BP: 120/80  BP Location: Right arm  Patient Position: Sitting  Pain Score: 0-No pain  Height and Weight  Height: 5' 11" (180.3 cm)  Weight: 113.6 kg (250 lb 7.1 oz)  BSA (Calculated - sq m): 2.39 sq meters  BMI (Calculated): 34.9  Weight in (lb) to have BMI = 25: 178.9]     Body mass index is 34.93 kg/m².    Physical Exam  Constitutional:       Appearance: He is well-developed. " "  HENT:      Head: Normocephalic.   Eyes:      Pupils: Pupils are equal, round, and reactive to light.   Neck:      Thyroid: No thyromegaly.   Cardiovascular:      Rate and Rhythm: Normal rate and regular rhythm.      Heart sounds: No murmur heard.     No friction rub. No gallop.   Pulmonary:      Effort: Pulmonary effort is normal.      Breath sounds: Normal breath sounds.   Abdominal:      General: Bowel sounds are normal.      Palpations: Abdomen is soft.   Musculoskeletal:         General: Normal range of motion.      Cervical back: Normal range of motion.   Skin:     General: Skin is warm and dry.   Neurological:      Mental Status: He is alert and oriented to person, place, and time.      Sensory: No sensory deficit.   Psychiatric:         Behavior: Behavior normal.          Laboratory:  CBC:  No results for input(s): "WBC", "RBC", "HGB", "HCT", "PLT", "MCV", "MCH", "MCHC" in the last 2160 hours.  CMP:  No results for input(s): "GLU", "CALCIUM", "ALBUMIN", "PROT", "NA", "K", "CO2", "CL", "BUN", "ALKPHOS", "ALT", "AST", "BILITOT" in the last 2160 hours.    Invalid input(s): "CREATININ"  URINALYSIS:  No results for input(s): "COLORU", "CLARITYU", "SPECGRAV", "PHUR", "PROTEINUA", "GLUCOSEU", "BILIRUBINCON", "BLOODU", "WBCU", "RBCU", "BACTERIA", "MUCUS", "NITRITE", "LEUKOCYTESUR", "UROBILINOGEN", "HYALINECASTS" in the last 2160 hours.   LIPIDS:  No results for input(s): "TSH", "HDL", "CHOL", "TRIG", "LDLCALC", "CHOLHDL", "NONHDLCHOL", "TOTALCHOLEST" in the last 2160 hours.  TSH:  No results for input(s): "TSH" in the last 2160 hours.  A1C:  No results for input(s): "HGBA1C" in the last 2160 hours.    Radiology:        Assessment:     Peng Sullivan is a 80 y.o.male with:    Prostate cancer    Mixed hyperlipidemia    Benign essential hypertension    Obesity (BMI 30.0-34.9)    Degeneration of intervertebral disc of lumbar region, unspecified whether pain present                Plan:     Problem List Items " Addressed This Visit       Mixed hyperlipidemia    Overview    Cont current regimen atorvastatin is tolerating well . All labs reviewed          Benign essential hypertension    Overview   bp well controlled on current regimen . Cont same          Obesity (BMI 30.0-34.9)    Overview   counciled on diet and exercise. He continues with his walking program         Degenerative lumbar disc    Overview   Stable          Prostate cancer - Primary    Overview   Urology dr groves managing             As above, continue current medications and maintain follow up with specialists.  Return to clinic in 6 months.      [facility] W Dantagn[facility]                       [1]   Social History  Socioeconomic History    Marital status:    Tobacco Use    Smoking status: Former     Current packs/day: 0.00     Average packs/day: 0.5 packs/day for 30.0 years (15.0 ttl pk-yrs)     Types: Cigarettes     Start date: 1960     Quit date:      Years since quittin.4     Passive exposure: Never    Smokeless tobacco: Former     Types: Chew     Quit date:    Substance and Sexual Activity    Alcohol use: Not Currently     Comment: 12 beers per year.    Drug use: No    Sexual activity: Yes     Partners: Female     Birth control/protection: None     Social Drivers of Health     Financial Resource Strain: Low Risk  (2025)    Overall Financial Resource Strain (CARDIA)     Difficulty of Paying Living Expenses: Not hard at all   Food Insecurity: No Food Insecurity (2025)    Hunger Vital Sign     Worried About Running Out of Food in the Last Year: Never true     Ran Out of Food in the Last Year: Never true   Transportation Needs: No Transportation Needs (2023)    PRAPARE - Transportation     Lack of Transportation (Medical): No     Lack of Transportation (Non-Medical): No   Physical Activity: Sufficiently Active (2025)    Exercise Vital Sign     Days of Exercise per  Week: 6 days     Minutes of Exercise per Session: 30 min   Stress: No Stress Concern Present (1/16/2025)    [employer]izen [employer] [place] [employer] - Occupational Stress Questionnaire     Feeling of Stress : Not at all   Housing Stability: Low Risk  (11/14/2023)    Housing Stability Vital Sign     Unable to Pay for Housing in the Last Year: No     Number of Places Lived in the Last Year: 1     Unstable Housing in the Last Year: No   [2]   Outpatient Encounter Medications as of 5/20/2025   Medication Sig Dispense Refill    atorvastatin (LIPITOR) 10 MG tablet TAKE 1 TABLET EVERY DAY 90 tablet 3    hydroCHLOROthiazide (MICROZIDE) 12.5 mg capsule TAKE 1 CAPSULE EVERY DAY 90 capsule 3    losartan (COZAAR) 50 MG tablet TAKE 1 TABLET EVERY DAY 90 tablet 3    tadalafiL (CIALIS) 10 MG tablet Take 1 tablet (10 mg total) by mouth daily as needed for Erectile Dysfunction. 20 tablet 3     No facility-administered encounter medications on file as of 5/20/2025.

## 2025-08-01 ENCOUNTER — LAB VISIT (OUTPATIENT)
Dept: LAB | Facility: HOSPITAL | Age: 80
End: 2025-08-01
Attending: STUDENT IN AN ORGANIZED HEALTH CARE EDUCATION/TRAINING PROGRAM
Payer: MEDICARE

## 2025-08-01 DIAGNOSIS — C61 PROSTATE CANCER: ICD-10-CM

## 2025-08-01 LAB — PSA SERPL-MCNC: 14.33 NG/ML

## 2025-08-01 PROCEDURE — 36415 COLL VENOUS BLD VENIPUNCTURE: CPT | Mod: HCNC

## 2025-08-01 PROCEDURE — 84153 ASSAY OF PSA TOTAL: CPT | Mod: HCNC

## 2025-08-04 ENCOUNTER — OFFICE VISIT (OUTPATIENT)
Dept: UROLOGY | Facility: CLINIC | Age: 80
End: 2025-08-04
Payer: MEDICARE

## 2025-08-04 DIAGNOSIS — C61 PROSTATE CANCER: Primary | ICD-10-CM

## 2025-08-04 RX ORDER — ENEMA 19; 7 G/133ML; G/133ML
1 ENEMA RECTAL ONCE
Qty: 1 ENEMA | Refills: 0 | Status: SHIPPED | OUTPATIENT
Start: 2025-08-04 | End: 2025-08-04

## 2025-08-04 RX ORDER — CIPROFLOXACIN 500 MG/1
TABLET, FILM COATED ORAL
Qty: 1 TABLET | Refills: 0 | Status: SHIPPED | OUTPATIENT
Start: 2025-08-04

## 2025-08-04 NOTE — PROGRESS NOTES
The patient location is: [place]  The chief complaint leading to consultation is: Low Risk Prostate Cancer    Visit type: audiovisual    Face to Face time with patient: 15  30 minutes of total time spent on the encounter, which includes face to face time and non-face to face time preparing to see the patient (eg, review of tests), Obtaining and/or reviewing separately obtained history, Documenting clinical information in the electronic or other health record, Independently interpreting results (not separately reported) and communicating results to the patient/family/caregiver, or Care coordination (not separately reported).         Each patient to whom he or she provides medical services by telemedicine is:  (1) informed of the relationship between the physician and patient and the respective role of any other health care provider with respect to management of the patient; and (2) notified that he or she may decline to receive medical services by telemedicine and may withdraw from such care at any time.    Notes:     [facility]  Urologic Oncology Clinic Note        Telehealth visit  Visit type: audio + visual   Patient location : Home      Patient was identified by name and birth date. The patient agreed to proceed with a telehealth visit. The visit was performed by audio and video communication.       Date of Service: 8/4/2025        Chief Complaint/Reason for Consultation:  Low Risk Prostate Cancer    Requesting Provider:   No referring provider defined for this encounter.      History of Present Illness:   Patient 79 y.o. male presents with hx of low risk prostate cancer.      No family hx of prostate cancer.     Only voiding complaint is daytime frequency. Nocturia 0-1. Some mild ED, not taking PDE5I.     No other new changes in health. Having some trouble with right lower extremity, but no heart attacks or strokes.     At last visit we discussed slight rise in his PSA in the absence of new high grade  lesion on MRI. Informed decision to continue active surveillance at this time. He had concerns about low back pain for which we obtained a CT AP with contrast which demonstrated no evidence of metastatic prostate cancer to nodes or bones.    Today he returns with 6 month PSA check.  No new complaints.   No bone pain.  Does 30mins of cardio a day  Coaching volleyball    Urologic History:      10/7/2022 -- PSA 7.4  11/4/2022 -- mpMRI prostate -- PV 32cc, PSA 7.4, PSAD 0.23; MARKUS 1 PIRADS 3 Left anterior TZ  11/30/2022 -- UroNav + systematic bx -- LA 3+3 1/1 cores, target negative, all other cores benign  12/29/2022 -- IPSS 13, nocturia 0-1; EUGENE 21  6/2/2023 -- PSA 9.6  11/13/2023 -- PSA 9.4  11/13/2023 -- mpMRI prostate -- PV 31, PSA 9, PSAD 0.3 -- stable 1.8cm PIRADS 3 lesion in left TZ  11/16/2023 -- Uron[facility] + systematic bx -- LLA 3+3, LMA 3+3, LLM 3+3, target 1 3+3  5/20/2024 -- PSA 10.5  11/21/2024 -- PSA 12.5   12/9/2024 -- mpMRI prostate -- PV 29, PSA 12, PSAD 0.4 -- no high grade lesions seen in the prostate.   2/1/2025 -- CT AP w/contrast -- negative for obvious metastatic disease in the setting of rising PSA w/ low grade prostate cancer history  8/1/2025 -- PSA 14.33    Patient Active Problem List    Diagnosis Date Noted    Edema of left lower extremity 04/29/2025    Pain 06/19/2023    Prostate cancer 04/27/2023    Annual physical exam 10/01/2021    Degenerative lumbar disc 03/08/2021    Seasonal allergic rhinitis due to pollen 04/21/2020    Obesity (BMI 30.0-34.9) 03/03/2020    Prostate cancer screening 03/03/2020    Bronchitis 11/19/2019    Mixed hyperlipidemia 08/22/2019    Benign essential hypertension 08/22/2019    Colon adenoma 07/19/2019    Posterior vitreous detachment of both eyes 10/25/2012    Nuclear sclerotic cataract of both eyes 10/25/2012          Review of patient's allergies indicates:  No Known Allergies       Past Medical History:   Diagnosis Date    Degenerative lumbar disc 03/08/2021     Diverticulosis     Hemorrhoids     High cholesterol     History of colon polyps 6/2/99, 6/3/19    Hypertension     Prostate cancer       Past Surgical History:   Procedure Laterality Date    APPENDECTOMY  01/01/1954    COLONOSCOPY  06/03/2019    [facility]    COLONOSCOPY N/A 07/19/2019    Procedure: COLONOSCOPY/pos emr;  Surgeon: Shantanu Lee MD;  Location: [facility] ENDO (2ND FLR);  Service: Endoscopy;  Laterality: N/A;  Referred by Dr. BERTO Keyes-see media tab for scanned records-      HT: 5'11, WT: 254.4, BMI: 35.5    COLONOSCOPY, SCREENING, HIGH RISK PATIENT N/A 3/10/2025    Procedure: COLONOSCOPY, SCREENING, HIGH RISK PATIENT;  Surgeon: Sherif Moore MD;  Location: [facility] ENDO (4TH FLR);  Service: Endoscopy;  Laterality: N/A;  ref by / suprep inst portal-RB  2/28/25-Precall completed and confirmed-WENDY Spence RN      Family History   Problem Relation Name Age of Onset    No Known Problems Mother      Heart attack Father Peng Sr.     Heart disease Father Peng Sr.         Congestive heart failure    Cataracts Sister Zeenat     Colon cancer Sister Zeenat     Cancer Sister Zeenat         colon?    Breast cancer Sister Keely     Cancer Sister Keely     No Known Problems Sister      No Known Problems Sister      No Known Problems Sister      No Known Problems Brother      No Known Problems Maternal Aunt      No Known Problems Maternal Uncle      No Known Problems Paternal Aunt      No Known Problems Paternal Uncle      No Known Problems Maternal Grandmother      No Known Problems Maternal Grandfather      No Known Problems Paternal Grandmother      No Known Problems Paternal Grandfather      Amblyopia Neg Hx      Blindness Neg Hx      Diabetes Neg Hx      Glaucoma Neg Hx      Hypertension Neg Hx      Macular degeneration Neg Hx      Retinal detachment Neg Hx      Strabismus Neg Hx      Stroke Neg Hx      Thyroid disease Neg Hx        Social History     Tobacco Use    Smoking status: Former      Current packs/day: 0.00     Average packs/day: 0.5 packs/day for 30.0 years (15.0 ttl pk-yrs)     Types: Cigarettes     Start date: 1960     Quit date:      Years since quittin.6     Passive exposure: Never    Smokeless tobacco: Former     Types: Chew     Quit date:    Substance Use Topics    Alcohol use: Not Currently     Comment: 12 beers per year.        Review of Systems   Constitutional:  Negative for activity change and fatigue.   HENT:  Negative for congestion.    Respiratory:  Negative for cough.    Cardiovascular:  Negative for chest pain.   Gastrointestinal:  Negative for abdominal pain.   Genitourinary:  Positive for dysuria and hematuria. Negative for flank pain.             OBJECTIVE:     Virtual visit so no exam performed.        LAB:      All laboratory data listed below was independently interpreted and reviewed with patient in clinic today      CMP  Sodium   Date Value Ref Range Status   2024 139 136 - 145 mmol/L Final     Potassium   Date Value Ref Range Status   2024 4.4 3.5 - 5.1 mmol/L Final     Chloride   Date Value Ref Range Status   2024 103 95 - 110 mmol/L Final     CO2   Date Value Ref Range Status   2024 29 23 - 29 mmol/L Final     Glucose   Date Value Ref Range Status   2024 97 70 - 110 mg/dL Final     BUN   Date Value Ref Range Status   2024 21 8 - 23 mg/dL Final     Creatinine   Date Value Ref Range Status   2024 0.8 0.5 - 1.4 mg/dL Final     Calcium   Date Value Ref Range Status   2024 9.5 8.7 - 10.5 mg/dL Final     Total Protein   Date Value Ref Range Status   2024 7.0 6.0 - 8.4 g/dL Final     Albumin   Date Value Ref Range Status   2024 3.9 3.5 - 5.2 g/dL Final     Total Bilirubin   Date Value Ref Range Status   2024 0.6 0.1 - 1.0 mg/dL Final     Comment:     For infants and newborns, interpretation of results should be based  on gestational age, weight and in agreement with  clinical  observations.    Premature Infant recommended reference ranges:  Up to 24 hours.............<8.0 mg/dL  Up to 48 hours............<12.0 mg/dL  3-5 days..................<15.0 mg/dL  6-29 days.................<15.0 mg/dL       Alkaline Phosphatase   Date Value Ref Range Status   11/21/2024 72 40 - 150 U/L Final     AST   Date Value Ref Range Status   11/21/2024 20 10 - 40 U/L Final     ALT   Date Value Ref Range Status   11/21/2024 26 10 - 44 U/L Final     Anion Gap   Date Value Ref Range Status   11/21/2024 7 (L) 8 - 16 mmol/L Final     eGFR   Date Value Ref Range Status   11/21/2024 >60.0 >60 mL/min/1.73 m^2 Final     Lab Results   Component Value Date    WBC 7.13 11/21/2024    HGB 15.2 11/21/2024    HCT 44.5 11/21/2024    MCV 89 11/21/2024     11/21/2024           IMAGING:      All imaging data listed below was independently interpreted and reviewed with patient in clinic today    12/8/2024  MRI PROSTATE W W/O CONTRAST     CLINICAL HISTORY:  Prostate cancer suspected;  Elevated prostate specific antigen (PSA)     Additional history: None provided.     TECHNIQUE:  Multiparametric MRI of the prostate/pelvis performed on a 3T scanner with phase pelvic coil. Multiplanar, multisequence images including high resolution, small field-of-view T2-WI; axial diffusion weighted images with multiple B-values and creation of ADC-maps; and dynamic contrast enhanced T1-weighted images through the prostate were obtained before, during, and after the administration of 10 cc intravenous gadolinium.     COMPARISON:  MRI prostate 11/13/2023     FINDINGS:  Previous biopsy: Negative 11/26/2023     PSA: 12.5 ng/mL 11/21/2024     Prior therapy: None     Prostate: 4.5 x 3.3 x 4.0 cm corresponding to a computed volume of 29.5 cc.     Prostate density: 0.4     Peripheral zone: Diffuse mild T2 hypointensity, score 2.     Transitional zone: Benign prostatic hyperplasia without focal suspicious abnormality, score 2.      Neurovascular bundle: Normal     Seminal vesicles: Normal appearance.     Lymphadenopathy: None.     Impression:     No suspicious prostate lesion.     Overall Assessment: PI-RADS 2 - Low (clinically significant cancer is unlikely to be present)     Number of targets created for potential MR/US fusion biopsy     Peripheral zone: 0     Transition zone: 0     Electronically signed by resident: Ember Barcenas  Date:                                            12/09/2024  Time:                                           15:37     Electronically signed by:Gelacio Anguiano MD  Date:                                            12/09/2024  Time:                                           16:20  2/1/2025  CT ABDOMEN PELVIS WITH IV CONTRAST     CLINICAL HISTORY:  elevated PSA, prostate cancer;  Malignant neoplasm of prostate     TECHNIQUE:  Low dose axial images, sagittal and coronal reformations were obtained from the lung bases to the pubic symphysis following the IV administration of 100 mL of Omnipaque 350 intravenous contrast.     COMPARISON:  MRI prostate with without 12/09/2024, 11/13/2023, 11/04/2022     FINDINGS:  Lungs:  No consolidation or pleural effusion in the visualized lung bases.     Heart: Normal size.     Liver: Enlarged, 19 cm.  No focal abnormality.     Gallbladder: Normally distended without calcified gallstones.  No pericholecystic inflammatory changes.     Bile ducts: No intrahepatic or extrahepatic biliary ductal dilatation.     Spleen: Normal size.     Pancreas: Fatty atrophy.  No mass or ductal dilatation.  No peripancreatic fat stranding.     Adrenals: No significant abnormality     Renal/Ureters: The kidneys enhance symmetrically.  Multiple focal simple fluid attenuating left renal hypodensities, the largest measures 4.1 cm.  No hydroureteronephrosis or stones.  The urinary bladder is unremarkable.     Reproductive: Mildly enlarged prostate with dystrophic calcifications.     Stomach/Bowel no evidence of  obstruction or inflammatory changes.     Peritoneum: No free fluid. No intraperitoneal free air.     Lymph Nodes: No pathologic kameron enlargement in the abdomen or pelvis.     Vasculature: Abdominal aorta tapers normally.  Mild calcified atherosclerosis of the abdominal aorta and its branches. Portal vasculature, SMV, and splenic vein are patent.     Bones: Degenerative changes of the spine.  No osseous destructive changes.     Soft Tissues: No significant abnormality.     Impression:     Left simple renal cysts.     Hepatomegaly.     No CT evidence of metastatic disease.     Electronically signed by resident: Juno Olivares  Date:                                            02/02/2025  Time:                                           16:05     Electronically signed by:Fazal Miles  Date:                                            02/02/2025  Time:                                           16:45       ASSESSMENT/PLAN:       81 yo M w/ hx of low risk prostate cancer      Plan:     Prostate Cancer, low risk  Today I reviewed with the patient his urologic history, PSA trend, and imaging. We determined that he still has NCCN low risk prostate cancer. I pointed out that he is still a good candidate for active surveillance , but that at any time he could choose to engage in active treatment. I told him that 50% of men with his disease classification are still on active surveillance 10 years later. I also informed him that the medical community is unaware of any man dying of prostate cancer with pure Nicolas 3+3 disease.     Again I briefly informed him of the potential treatment options if he so chose like radiation, surgery, or ablation.     Through a shared decision making process I encouraged him to continue active surveillance but left the choice up to him. We did talk about some reasons to transition to active treatment such as (1) a greater volume of cancer detected at biopsy, (2) higher grade cancer at biopsy, (3)  patient concern or preference.  He currently does NOT fit the above criteria.    Lastly, I told him that I think we should continue on a low intensity protocol of q1yr [place] and PSA. And tumor burden reassessment every 1-3 years or more.    Finally I told the patient that even though we might think the patient is a candidate for AS, understaging and undergrading does occur, and knowledge of whether the patient is actually a good candidate is incomplete.  I could not make any guarantee that the patient was making the right decision or that his cancer may later be cured if the patient eventually opted for treatment. The patient expressed understanding of these issues.     Ultimately, through a shared decision making process he would like to continue AS.     At last visit we reviewed with patient CT scan for concerns of low back pain w/ rising PSA on active surveillance for his prostate cancer. There is no evidence of metastatic disease as such will continue on surveillance w/ PSA in 6 months.   Today PSA higher than last, with 4 point rise since last summer.   Patient would like prostate biopsy ensure cancer has not progressed to high grade disease  He is interested in localized treatment if necessary.    Need to set up office TP biopsy  Cipro and Betsy Rx      Gross Hematuria  -Resolved  -Reported hematuria in past at time of UTI which was treated  -Discussed risks and benefits of cystoscopy   -Repeat UA negative       - Visit today included increased complexity associated with the ongoing care of the episodic problem prostate cancer which has been addressed and requires the longitudinal care of the patient due to the serious and/or complex managed problem of prostate cancer.       Maury Cartagena MD  Urologic Oncology  P: [phone]                    prostate

## 2025-08-12 ENCOUNTER — PATIENT MESSAGE (OUTPATIENT)
Dept: UROLOGY | Facility: CLINIC | Age: 80
End: 2025-08-12
Payer: MEDICARE

## 2025-08-12 DIAGNOSIS — N52.9 ERECTILE DYSFUNCTION, UNSPECIFIED ERECTILE DYSFUNCTION TYPE: ICD-10-CM

## 2025-08-12 RX ORDER — TADALAFIL 10 MG/1
10 TABLET ORAL DAILY PRN
Qty: 20 TABLET | Refills: 3 | Status: SHIPPED | OUTPATIENT
Start: 2025-08-12 | End: 2026-08-12

## 2025-08-21 ENCOUNTER — PROCEDURE VISIT (OUTPATIENT)
Dept: UROLOGY | Facility: CLINIC | Age: 80
End: 2025-08-21
Payer: MEDICARE

## 2025-08-21 VITALS
RESPIRATION RATE: 17 BRPM | WEIGHT: 254.88 LBS | TEMPERATURE: 98 F | BODY MASS INDEX: 35.54 KG/M2 | DIASTOLIC BLOOD PRESSURE: 88 MMHG | SYSTOLIC BLOOD PRESSURE: 170 MMHG | HEART RATE: 88 BPM

## 2025-08-21 DIAGNOSIS — C61 PROSTATE CANCER: ICD-10-CM

## 2025-08-21 PROCEDURE — 88305 TISSUE EXAM BY PATHOLOGIST: CPT | Mod: TC,91

## 2025-08-21 RX ORDER — LIDOCAINE HYDROCHLORIDE 10 MG/ML
30 INJECTION, SOLUTION INFILTRATION; PERINEURAL
Status: COMPLETED | OUTPATIENT
Start: 2025-08-21 | End: 2025-08-21

## 2025-08-21 RX ORDER — LIDOCAINE HYDROCHLORIDE 20 MG/ML
JELLY TOPICAL
Status: DISCONTINUED | OUTPATIENT
Start: 2025-08-21 | End: 2025-08-21

## 2025-08-21 RX ORDER — CEFTRIAXONE 1 G/1
1 INJECTION, POWDER, FOR SOLUTION INTRAMUSCULAR; INTRAVENOUS
Status: DISCONTINUED | OUTPATIENT
Start: 2025-08-21 | End: 2025-08-21

## 2025-08-21 RX ORDER — LIDOCAINE HYDROCHLORIDE 10 MG/ML
10 INJECTION, SOLUTION INFILTRATION; PERINEURAL
Status: DISCONTINUED | OUTPATIENT
Start: 2025-08-21 | End: 2025-08-21

## 2025-08-21 RX ADMIN — LIDOCAINE HYDROCHLORIDE 10 ML: 20 JELLY TOPICAL at 03:08

## 2025-08-21 RX ADMIN — LIDOCAINE HYDROCHLORIDE 30 ML: 10 INJECTION, SOLUTION INFILTRATION; PERINEURAL at 03:08

## 2025-08-28 LAB
DHEA SERPL-MCNC: NORMAL
ESTROGEN SERPL-MCNC: NORMAL PG/ML
INSULIN SERPL-ACNC: NORMAL U[IU]/ML
LAB AP CLINICAL INFORMATION: NORMAL
LAB AP GROSS DESCRIPTION: NORMAL
LAB AP PERFORMING LOCATION(S): NORMAL
LAB AP REPORT FOOTNOTES: NORMAL